# Patient Record
Sex: FEMALE | Race: WHITE | Employment: FULL TIME | URBAN - METROPOLITAN AREA
[De-identification: names, ages, dates, MRNs, and addresses within clinical notes are randomized per-mention and may not be internally consistent; named-entity substitution may affect disease eponyms.]

---

## 2018-05-10 ENCOUNTER — OFFICE VISIT (OUTPATIENT)
Dept: INTERNAL MEDICINE CLINIC | Age: 48
End: 2018-05-10

## 2018-05-10 VITALS
SYSTOLIC BLOOD PRESSURE: 116 MMHG | WEIGHT: 244 LBS | BODY MASS INDEX: 43.23 KG/M2 | HEART RATE: 99 BPM | OXYGEN SATURATION: 97 % | DIASTOLIC BLOOD PRESSURE: 78 MMHG | HEIGHT: 63 IN

## 2018-05-10 DIAGNOSIS — E78.2 MIXED HYPERLIPIDEMIA: ICD-10-CM

## 2018-05-10 DIAGNOSIS — F32.A DEPRESSION, UNSPECIFIED DEPRESSION TYPE: ICD-10-CM

## 2018-05-10 DIAGNOSIS — E66.01 OBESITY, MORBID (HCC): ICD-10-CM

## 2018-05-10 DIAGNOSIS — Z23 ENCOUNTER FOR IMMUNIZATION: Primary | ICD-10-CM

## 2018-05-10 DIAGNOSIS — R25.1 TREMORS OF NERVOUS SYSTEM: ICD-10-CM

## 2018-05-10 DIAGNOSIS — I10 ESSENTIAL HYPERTENSION: ICD-10-CM

## 2018-05-10 DIAGNOSIS — F90.9 ATTENTION DEFICIT HYPERACTIVITY DISORDER (ADHD), UNSPECIFIED ADHD TYPE: ICD-10-CM

## 2018-05-10 DIAGNOSIS — E28.2 POLYCYSTIC DISEASE, OVARIES: ICD-10-CM

## 2018-05-10 RX ORDER — METHYLPHENIDATE HYDROCHLORIDE 36 MG/1
36 TABLET ORAL
COMMUNITY
End: 2018-08-06 | Stop reason: SDUPTHER

## 2018-05-10 RX ORDER — OLOPATADINE HYDROCHLORIDE 1 MG/ML
2 SOLUTION/ DROPS OPHTHALMIC 2 TIMES DAILY
COMMUNITY
End: 2018-11-15 | Stop reason: SDUPTHER

## 2018-05-10 RX ORDER — SERTRALINE HYDROCHLORIDE 50 MG/1
50 TABLET, FILM COATED ORAL AS NEEDED
COMMUNITY
End: 2018-11-15

## 2018-05-10 RX ORDER — LISINOPRIL AND HYDROCHLOROTHIAZIDE 10; 12.5 MG/1; MG/1
TABLET ORAL DAILY
COMMUNITY
End: 2018-07-05 | Stop reason: SDUPTHER

## 2018-05-10 RX ORDER — CLONAZEPAM 0.5 MG/1
TABLET ORAL
COMMUNITY
End: 2018-09-10 | Stop reason: SDUPTHER

## 2018-05-10 RX ORDER — BISMUTH SUBSALICYLATE 262 MG
1 TABLET,CHEWABLE ORAL DAILY
COMMUNITY

## 2018-05-10 NOTE — PROGRESS NOTES
Subjective:  Ms. Sasha Fatima is a pleasant 52year old lady who is transitioning her care from [facility]. She is here to establish care. She has no complaints. Past Medical History:   Diagnosis Date    Hypertension     Ill-defined condition     PCOS     Past Surgical History:   Procedure Laterality Date    HX GYN      cyst removal    HX ORTHOPAEDIC      L Knee    HX ROTATOR CUFF REPAIR      right    HX TONSILLECTOMY         Current Outpatient Prescriptions on File Prior to Visit   Medication Sig Dispense Refill    omeprazole (PRILOSEC) 40 mg capsule Take 40 mg by mouth daily.  primidone (MYSOLINE) 50 mg tablet Take 50 mg by mouth daily.  metFORMIN (GLUCOPHAGE) 500 mg tablet Take 500 mg by mouth nightly. 2 at bedtime  Indications: Polycystic Ovarian Syndrome       No current facility-administered medications on file prior to visit. Allergies   Allergen Reactions    Pcn [Penicillins] Anaphylaxis    Zithromax [Azithromycin] Anaphylaxis   Past Medical History/Surgeries:    1. Tonsillectomy. 2. Left knee arthroscopy. 3. Right rotator cuff repair. 4. Multiple myringotomies. 5. Excision of left ovarian cyst.  6. Excision of sty right eye. Illnesses:  1. Hypertension. 2. Hyperlipidemia. 3. ADHD. 4. Nonessential tremors. 5. PTSD. 6. PCOD. 7. Chronic anxiety. Family History: Mother  at 29 of a massive heart attack. Father  at 29 of a ruptured cerebral aneurysm. Four sisters living, one has had breast cancer, but she has done well. Social History:  She is , lives alone. She works in [employer] for [address] [place] [address] [employer]. She has no children. Allergies:  Penicillin and Zithromax. Medications:   Listed in [facility]. Habits:  She smokes one pack of cigarettes per day, but does not drink. Review of Systems:  HEENT:  Denies any headaches, dizziness or blurred vision. CVR:  Denies any chest pain or palpitations.   Denies any shortness of breath, cough, wheezing, PND or orthopnea. Denies any ankle edema. GI:  Appetite is good, weight is stable. Does have a normal bowel pattern without presence of blood in stools or melena. :  Denies any urinary symptoms. PSYCHOSOCIAL:  She has been treated for depression and anxiety for quite some time. She tells me she is not taking her Zoloft right now and since then she's lost some weight. Physical Examination:  GENERAL:  Pleasant, markedly obese lady in no acute distress. She is alert and oriented. VITALS:  BP: initially 116/78, repeated by myself 110/70. P: 99.  O2 sat: 97%. WT: 244 lbs. HT: 5'2\". HEENT:  Normocephalic, atraumatic. PERRLA, EOMI. TMs normal.  Mouth mucosa pink. Tongue midline. Pharynx normal.  NECK:  Supple without adenopathy, thyromegaly or carotid bruits. CHEST:  Lungs clear to ausculation, no rales or wheezes. CV:  Heart regular rhythm without murmur or gallop. ABDOMEN:  Soft, non tender, no organomegaly or masses. EXTREMITIES:  No edema or calf tenderness. Distal pulses were present. NEUROLOGIC:  Cranial nerves II-XII intact. Excellent strength in the upper and lower extremities against resistance. Sensation is preserved. Romberg is negative. Reflexes 2+ and symmetrical.    Impression:  1. Hypertension, stable. 2. Hyperlipidemia. 3. ADHD. 4. Chronic depression/anxiety. 5. Nonessential tremors. 6. Marked obesity. 7. PCOD. Plan:  1. She will continue with her current regimen and I will have her return for fasting lab studies. 2. While here today she did sign the substance contract agreement and understands she will have to be seen every six months. 3. While here today I gave her a TDAP in her left deltoid, which she tolerated well. 4. She is referred to Dr. Tobias Stevens for pelvic and pap. 5. She did get a mammogram in December and will obtain results.

## 2018-05-10 NOTE — LETTER
Name:Nick Rao [employer] NCS:[birth year] MR #:[id number] Provider Name:Janelle Gutierrez NP  
*YEWY-877* BSMG-491 (5/16) Page 1 of 5 Initial [employer] CONTROLLED SUBSTANCE AGREEMENT I may be prescribed medications that are controlled substances as part  of my treatment plan for management of my medical condition(s). The goal of my treatment plan is to maintain and/or improve my health and wellbeing. Because controlled substances have an increased risk of abuse or harm, continual re-evaluation is needed determine if the goals of my treatment plan are being met for my safety and the safety of others. I, Xavi Camejo  am entering into this Controlled Substance Agreement with my provider, Beti Kidd NP at [place] [address] . I understand that successful treatment requires mutual trust and honesty between me and my provider. I understand that there are state and federal laws and regulations which apply to the medications that my provider may prescribe that must be followed. I understand there are risks and benefits ts of taking the medicines that my provider may prescribe. I understand and agree that following this Agreement is necessary in continuing my provider-patient relationship and success of my treatment plan. As a part of my treatment plan, I agree to the following: COMMUNICATION: 
 
1. I will communicate fully with my provider about my medical condition(s), including the effect on my daily life and how well my medications are helping. I will tell my provider all of the medications that I take for any reason, including medications I receive from another health care provider, and will notify my provider about all issues, problems or concerns, including any side effects, which may be related to my medications.  
 
I understand that this information allows my provider to adjust my treatment plan to help manage my medical condition. I understand that this information will become part of my permanent medical record. 2. I will notify my provider if I have a history of alcohol/drug misuse/addiction or if I have had treatment for alcohol/drug addiction in the past, or if I have a new problem with or concern about alcohol/drug use/addiction, because this increases the likelihood of high risk behaviors and may lead to serious medical conditions. 3. Females Only: I will notify my provider if I am or become pregnant, or if I intend to become pregnant, or if I intend to breastfeed. I understand that communication of these issues with my provider is important, due to possible effects my medication could have on an unborn fetus or breastfeeding child. Name:. Imogene Hodgkin TJY:[birth year] MR #:[id number] Provider Name:Jacynthe Carmine Gowers, NP  
*HTPX-875* BSMG-491 (5/16) Page 2 of 5 Initial [employer] MISUSE OF MEDICATIONS / DRUGS: 
 
1. I agree to take all controlled substances as prescribed, and will not misuse or abuse any controlled substances prescribed by my provider. For my safety, I will not increase the amount of medicine I take without first talking with and getting permission from my provider. 2. If I have a medical emergency, another health care provider may prescribe me medication. If I seek emergency treatment, I will notify my provider within seventy-two (72) hours. 3. I understand that my provider may discuss my use and/or possible misuse/abuse of controlled substances and alcohol, as appropriate, with any health care provider involved in my care, pharmacist or legal authority. ILLEGAL DRUGS: 
 
1. I will not use illegal drugs of any kind, including but not limited to marijuana, heroin, cocaine, or any prescription drug which is not prescribed to me.  
 
DRUG DIVERSION / PRESCRIPTION FRAUD: 
 
 1. I will not share, sell, trade, give away, or otherwise misuse my prescriptions or medications. 2. I will not alter any prescriptions provided to me by my provider. SINGLE PROVIDER: 
 
1. I agree that all controlled substances that I take will be prescribed only by my provider (or his/her covering provider) under this Agreement. This agreement does not prevent me from seeking emergency medical treatment or receiving pain management related to a surgery. PROTECTING MEDICATIONS: 
 
1. I am responsible for keeping my prescriptions and medications in a safe and secure place including safeguarding them from loss or theft. I understand that lost, stolen or damaged/destroyed prescriptions or medications will not be replaced. Name:Annelise Guzman PIV:[birth year] MR #:[id number] Provider Name:Janelle Montez NP  
*HXPC-550* BSMG-491 (5/16) Page 3 of 5 Initial [employer] PRESCRIPTION RENEWALS/REFILLS: 
 
 
1. I authorize my provider and my pharmacy to cooperate fully with any local, state, or federal law enforcement agency in the investigation of any possible misuse, sale, or other diversion of my controlled substance prescriptions or medications. RISKS: 
 
 
1. I understand that if I do not adhere to this Agreement in any way, my provider may change my prescriptions, stop prescribing controlled substances or end our provider-patient relationship. 2. If my provider decides to stop prescribing medication, or decides to end our provider-patient relationship,my provider may require that I taper my medications slowly. If necessary, my provider may also provide a prescription for other medications to treat my withdrawal symptoms. UNDERSTANDING THIS AGREEMENT: 
 
I understand that my provider may adjust or stop my prescriptions for controlled substances based on my medical condition and my treatment plan. I understand that this Agreement does not guarantee that I will be prescribed medications or controlled substances. I understand that controlled substances may be just one part of my treatment plan. My initial on each page and my signature below shows that I have read each page of this Agreement, I have had an opportunity to ask questions, and all of my questions have been answered to my satisfaction by my provider. By signing below, I agree to comply with this Agreement, and I understand that if I do not follow the Agreements listed above, my provider may stop 
 
 
 
_________________________________________  Date/Time 5/10/2018 2:53 PM   
             (Patient Signature)

## 2018-05-10 NOTE — PROGRESS NOTES
Eduar Magallanes presents with   Chief Complaint   Patient presents with   [address] patient here to establish care. She is not fasting. Has ALLERGIES to [facility]. Medications listed & updated in the chart. [facility] [place] [facility]. 1. Have you been to the ER, urgent care clinic since your last visit? Hospitalized since your last visit? No    2. Have you seen or consulted any other health care providers outside of the [address] [place] [address] since your last visit? Include any pap smears or colon screening.  No

## 2018-05-10 NOTE — LETTER
Name:Nick Rao [facility] UQN:[birth year] MR #:[id number] Provider Name:Janelle Dumont NP  
*QYEV-646* BSMG-491 (5/16) Page 1 of 5 Initial [employer] CONTROLLED SUBSTANCE AGREEMENT I may be prescribed medications that are controlled substances as part  of my treatment plan for management of my medical condition(s). The goal of my treatment plan is to maintain and/or improve my health and wellbeing. Because controlled substances have an increased risk of abuse or harm, continual re-evaluation is needed determine if the goals of my treatment plan are being met for my safety and the safety of others. ICassandra  am entering into this Controlled Substance Agreement with my provider, Tuan Andrea NP at [place] [address] . I understand that successful treatment requires mutual trust and honesty between me and my provider. I understand that there are state and federal laws and regulations which apply to the medications that my provider may prescribe that must be followed. I understand there are risks and benefits ts of taking the medicines that my provider may prescribe. I understand and agree that following this Agreement is necessary in continuing my provider-patient relationship and success of my treatment plan. As a part of my treatment plan, I agree to the following: COMMUNICATION: 
 
1. I will communicate fully with my provider about my medical condition(s), including the effect on my daily life and how well my medications are helping. I will tell my provider all of the medications that I take for any reason, including medications I receive from another health care provider, and will notify my provider about all issues, problems or concerns, including any side effects, which may be related to my medications.  
 
I understand that this information allows my provider to adjust my treatment plan to help manage my medical condition. I understand that this information will become part of my permanent medical record. 2. I will notify my provider if I have a history of alcohol/drug misuse/addiction or if I have had treatment for alcohol/drug addiction in the past, or if I have a new problem with or concern about alcohol/drug use/addiction, because this increases the likelihood of high risk behaviors and may lead to serious medical conditions. 3. Females Only: I will notify my provider if I am or become pregnant, or if I intend to become pregnant, or if I intend to breastfeed. I understand that communication of these issues with my provider is important, due to possible effects my medication could have on an unborn fetus or breastfeeding child. Name:. Andre [employer]:[birth year] MR #:[id number] Provider Name:Janelle Connors NP  
*MEIC-764* BSMG-491 (5/16) Page 2 of 5 Initial [employer] MISUSE OF MEDICATIONS / DRUGS: 
 
1. I agree to take all controlled substances as prescribed, and will not misuse or abuse any controlled substances prescribed by my provider. For my safety, I will not increase the amount of medicine I take without first talking with and getting permission from my provider. 2. If I have a medical emergency, another health care provider may prescribe me medication. If I seek emergency treatment, I will notify my provider within seventy-two (72) hours. 3. I understand that my provider may discuss my use and/or possible misuse/abuse of controlled substances and alcohol, as appropriate, with any health care provider involved in my care, pharmacist or legal authority. ILLEGAL DRUGS: 
 
1. I will not use illegal drugs of any kind, including but not limited to marijuana, heroin, cocaine, or any prescription drug which is not prescribed to me.  
 
DRUG DIVERSION / PRESCRIPTION FRAUD: 
 
 1. I will not share, sell, trade, give away, or otherwise misuse my prescriptions or medications. 2. I will not alter any prescriptions provided to me by my provider. SINGLE PROVIDER: 
 
1. I agree that all controlled substances that I take will be prescribed only by my provider (or his/her covering provider) under this Agreement. This agreement does not prevent me from seeking emergency medical treatment or receiving pain management related to a surgery. PROTECTING MEDICATIONS: 
 
1. I am responsible for keeping my prescriptions and medications in a safe and secure place including safeguarding them from loss or theft. I understand that lost, stolen or damaged/destroyed prescriptions or medications will not be replaced. Name:Annelise Monae Friday FEA:[birth year] MR #:[id number] Provider Name:Janelle Corea NP  
*NJCJ-564* BSMG-491 (5/16) Page 3 of 5 Initial [employer] PRESCRIPTION RENEWALS/REFILLS: 
 
 
1. I authorize my provider and my pharmacy to cooperate fully with any local, state, or federal law enforcement agency in the investigation of any possible misuse, sale, or other diversion of my controlled substance prescriptions or medications. RISKS: 
 
 
1. I understand that if I do not adhere to this Agreement in any way, my provider may change my prescriptions, stop prescribing controlled substances or end our provider-patient relationship. 2. If my provider decides to stop prescribing medication, or decides to end our provider-patient relationship,my provider may require that I taper my medications slowly. If necessary, my provider may also provide a prescription for other medications to treat my withdrawal symptoms. UNDERSTANDING THIS AGREEMENT: 
 
I understand that my provider may adjust or stop my prescriptions for controlled substances based on my medical condition and my treatment plan. I understand that this Agreement does not guarantee that I will be prescribed medications or controlled substances. I understand that controlled substances may be just one part of my treatment plan. My initial on each page and my signature below shows that I have read each page of this Agreement, I have had an opportunity to ask questions, and all of my questions have been answered to my satisfaction by my provider. By signing below, I agree to comply with this Agreement, and I understand that if I do not follow the Agreements listed above, my provider may stop 
 
 
 
_________________________________________  Date/Time 5/10/2018 2:53 PM   
             (Patient Signature)

## 2018-05-10 NOTE — MR AVS SNAPSHOT
[address] [address] [phone] Patient: Gerard Or MRN: [id number] LNS:[id number] Visit Information Date & Time Provider Department Dept. Phone Encounter #  
 5/10/2018  1:30 PM Ryan Dumont NP [facility] [employer] [facility] [phone] [id number] Follow-up Instructions Return in about 6 months (around 11/10/2018). Your Appointments 5/17/2018  8:50 AM  
LAB with LAB ONLY  
[facility] ([facility]) Appt Note: Jason - Fasting labs [address] [id number] [address] [facility] [address] [address] Upcoming Health Maintenance Date Due Pneumococcal 19-64 Medium Risk (1 of 1 - PPSV23) 5/22/1989 DTaP/Tdap/Td series (1 - Tdap) 5/22/1991 PAP AKA CERVICAL CYTOLOGY 5/22/1991 Influenza Age 5 to Adult 8/1/2018 Allergies as of 5/10/2018  Review Complete On: 5/10/2018 By: Ryan Dumont NP Severity Noted Reaction Type Reactions Pcn [Penicillins] High 06/25/2015    Anaphylaxis Zithromax [Azithromycin] High 06/25/2015    Anaphylaxis Current Immunizations  Never Reviewed Name Date Tdap  Incomplete Not reviewed this visit You Were Diagnosed With   
  
 Codes Comments Encounter for immunization    -  Primary ICD-10-CM: H61 ICD-9-CM: V03.89 Vitals BP Pulse Height(growth percentile) Weight(growth percentile) SpO2 BMI  
 116/78 (BP 1 Location: Left arm, BP Patient Position: Sitting) 99 5' 2.5\" (1.588 m) 244 lb (110.7 kg) 97% 43.92 kg/m2 Smoking Status Current Every Day Smoker BMI and BSA Data Body Mass Index Body Surface Area 43.92 kg/m 2 2.21 m 2 Your Updated Medication List  
  
   
This list is accurate as of 5/10/18  3:59 PM.  Always use your most recent med list.  
  
  
  
  
 AZELASTINE-FLUTICASONE NA  
by Nasal route. clonazePAM 0.5 mg tablet Commonly known as:  Evlyn Etienne Take  by mouth nightly as needed. lisinopril-hydroCHLOROthiazide 10-12.5 mg per tablet Commonly known as:  Ginger Kirks Take  by mouth daily. metFORMIN 500 mg tablet Commonly known as:  GLUCOPHAGE Take 500 mg by mouth nightly. 2 at bedtime  Indications: Polycystic Ovarian Syndrome  
  
 methylphenidate HCl 36 mg CR tablet Commonly known as:  CONCERTA Take 36 mg by mouth every morning.  
  
 multivitamin tablet Commonly known as:  ONE A DAY Take 1 Tab by mouth daily. Indications: Flinstone chewable  
  
 olopatadine 0.1 % ophthalmic solution Commonly known as:  PATANOL Administer 2 Drops to both eyes two (2) times a day. PriLOSEC 40 mg capsule Generic drug:  omeprazole Take 40 mg by mouth daily. primidone 50 mg tablet Commonly known as: MYSOLINE Take 50 mg by mouth daily. sertraline 50 mg tablet Commonly known as:  ZOLOFT Take 50 mg by mouth as needed. We Performed the Following TETANUS, DIPHTHERIA TOXOIDS AND ACELLULAR PERTUSSIS VACCINE (TDAP), IN INDIVIDS. >=7, IM [id number] CPT(R)] Follow-up Instructions Return in about 6 months (around 11/10/2018). Patient Instructions DTaP (Diphtheria, Tetanus, Pertussis) Vaccine: What You Need to Know Why get vaccinated? Diphtheria, tetanus, and pertussis are serious diseases caused by bacteria. Diphtheria and pertussis are spread from person to person. Tetanus enters the body through cuts or wounds. DIPHTHERIA causes a thick covering in the back of the throat. · It can lead to breathing problems, paralysis, heart failure, and even death. TETANUS (Lockjaw) causes painful tightening of the muscles, usually all over the body.  
· It can lead to \"locking\" of the jaw so the victim cannot open his mouth or swallow. Tetanus leads to death in up to 2 out of 10 cases. PERTUSSIS (Whooping Cough) causes coughing spells so bad that it is hard for infants to eat, drink, or breathe. These spells can last for weeks. · It can lead to pneumonia, seizures (jerking and staring spells), brain damage, and death. Diphtheria, tetanus, and pertussis vaccine (DTaP) can help prevent these diseases. Most children who are vaccinated with DTaP will be protected throughout childhood. Many more children would get these diseases if we stopped vaccinating. DTaP is a safer version of an older vaccine called DTP. DTP is no longer used in the [place]. Who should get DTaP vaccine and when? Children should get 5 doses of DTaP vaccine, one dose at each of the following ages: · 2 months · 4 months · 6 months · 15-18 months · 4-6 years DTaP may be given at the same time as other vaccines. Some children should not get DTaP vaccine or should wait. · Children with minor illnesses, such as a cold, may be vaccinated. But children who are moderately or severely ill should usually wait until they recover before getting DTaP vaccine. · Any child who had a life-threatening allergic reaction after a dose of DTaP should not get another dose. · Any child who suffered a brain or nervous system disease within 7 days after a dose of DTaP should not get another dose. · Talk with your doctor if your child: 
Shannon Zimmer Had a seizure or collapsed after a dose of DTaP. ¨ Cried non-stop for 3 hours or more after a dose of DTaP. ¨ Had a fever over 105°F after a dose of DTaP. Ask your doctor for more information. Some of these children should not get another dose of pertussis vaccine, but may get a vaccine without pertussis, called DT. Older children and adults DTaP is not licensed for adolescents, adults, or children 9years of age and older. But older people still need protection.  A vaccine called Tdap is similar to DTaP. A single dose of Tdap is recommended for people 11 through 59years of age. Another vaccine, called Td, protects against tetanus and diphtheria, but not pertussis. It is recommended every 10 years. There are separate Vaccine Information Statements for these vaccines. What are the risks from DTaP vaccine? Getting diphtheria, tetanus, or pertussis disease is much riskier than getting DTaP vaccine. However, a vaccine, like any medicine, is capable of causing serious problems, such as severe allergic reactions. The risk of DTaP vaccine causing serious harm, or death, is extremely small. Mild Problems (Common) · Fever (up to about 1 child in 4) · Redness or swelling where the shot was given (up to about 1 child in 4) · Soreness or tenderness where the shot was given (up to about 1 child in 4) These problems occur more often after the 4th and 5th doses of the DTaP series than after earlier doses. Sometimes the 4th or 5th dose of DTaP vaccine is followed by swelling of the entire arm or leg in which the shot was given, lasting 1-7 days (up to about 1 child in 27). Other mild problems include: · Fussiness (up to about 1 child in 3) · Tiredness or poor appetite (up to about 1 child in 10) · Vomiting (up to about 1 child in 48) These problems generally occur 1-3 days after the shot. Moderate Problems (Uncommon) · Seizure (jerking or staring) (about 1 child out of 14,000) · Non-stop crying, for 3 hours or more (up to about 1 child out of 1,000) · High fever, over 105°F (about 1 child out of 16,000) Severe Problems (Very Rare) · Serious allergic reaction (less than 1 out of a million doses) · Several other severe problems have been reported after DTaP vaccine. These include: 
¨ Long-term seizures, coma, or lowered consciousness. ¨ Permanent brain damage. These are so rare it is hard to tell if they are caused by the vaccine.  
Controlling fever is especially important for children who have had seizures, for any reason. It is also important if another family member has had seizures. You can reduce fever and pain by giving your child an aspirin-free pain reliever when the shot is given, and for the next 24 hours, following the package instructions. What if there is a serious reaction? What should I look for? · Look for anything that concerns you, such as signs of a severe allergic reaction, very high fever, or behavior changes. Signs of a severe allergic reaction can include hives, swelling of the face and throat, difficulty breathing, a fast heartbeat, dizziness, and weakness. These would start a few minutes to a few hours after the vaccination. What should I do? · If you think it is a severe allergic reaction or other emergency that can't wait, call 9-1-1 or get the person to the nearest hospital. Otherwise, call your doctor. · Afterward, the reaction should be reported to the Vaccine Adverse Event Reporting System (VAERS). Your doctor might file this report, or you can do it yourself through the VAERS web site at www.vaers. [facility].gov, or by calling 1-842.532.6515. VAERS is only for reporting reactions. They do not give medical advice. The National Vaccine Injury Compensation Program 
The National Vaccine Injury Compensation Program (VICP) is a federal program that was created to compensate people who may have been injured by certain vaccines. Persons who believe they may have been injured by a vaccine can learn about the program and about filing a claim by calling 5-280.109.3025 or visiting the [employer] website at www.[facility].gov/vaccinecompensation. How can I learn more? · Ask your doctor. · Call your local or state health department. · Contact the Centers for Disease Control and Prevention (CDC): 
¨ Call 5-744.731.4209 (7-794-AIQ-INFO) or ¨ Visit CDC's website at www.cdc.gov/vaccines Vaccine Information Statement DTaP (Tetanus, Diphtheria, Pertussis ) Vaccine 
(5/17/2007) [address] 333IM-60 Department of Health and [place] Centers for Disease Control and Prevention Many Vaccine Information Statements are available in [place] and other languages. See www.immunize.org/vis. Muchas hojas de información sobre vacunas están disponibles en español y en otros idiomas. Visite www.immunize.org/vis. Care instructions adapted under license by [employer] (which disclaims liability or warranty for this information). If you have questions about a medical condition or this instruction, always ask your healthcare professional. [address] any warranty or liability for your use of this information. Introducing [facility]! [employer] introduces [employer] patient portal. Now you can access parts of your medical record, email your doctor's office, and request medication refills online. 1. In your internet browser, go to [email or web address] 2. Click on the First Time User? Click Here link in the Sign In box. You will see the New Member Sign Up page. 3. Enter your [employer] Access Code exactly as it appears below. You will not need to use this code after youve completed the sign-up process. If you do not sign up before the expiration date, you must request a new code. · [employer] Access Code: [email or web address] Expires: 8/8/2018  1:15 PM 
 
4. Enter the last four digits of your Social Security Number (xxxx) and Date of Birth (mm/dd/yyyy) as indicated and click Submit. You will be taken to the next sign-up page. 5. Create a [employer] ID. This will be your [employer] login ID and cannot be changed, so think of one that is secure and easy to remember. 6. Create a [employer] password. You can change your password at any time. 7. Enter your Password Reset Question and Answer. This can be used at a later time if you forget your password. 8. Enter your e-mail address. You will receive e-mail notification when new information is available in [address] 9. Click Sign Up. You can now view and download portions of your medical record. 10. Click the Download Summary menu link to download a portable copy of your medical information. If you have questions, please visit the Frequently Asked Questions section of the [employer] website. Remember, [employer] is NOT to be used for urgent needs. For medical emergencies, dial 911. Now available from your iPhone and Android! Please provide this summary of care documentation to your next provider. Your primary care clinician is listed as Rafael Wallace. If you have any questions after today's visit, please call [phone].

## 2018-05-10 NOTE — PATIENT INSTRUCTIONS
DTaP (Diphtheria, Tetanus, Pertussis) Vaccine: What You Need to Know  Why get vaccinated? Diphtheria, tetanus, and pertussis are serious diseases caused by bacteria. Diphtheria and pertussis are spread from person to person. Tetanus enters the body through cuts or wounds. DIPHTHERIA causes a thick covering in the back of the throat. · It can lead to breathing problems, paralysis, heart failure, and even death. TETANUS (Lockjaw) causes painful tightening of the muscles, usually all over the body. · It can lead to \"locking\" of the jaw so the victim cannot open his mouth or swallow. Tetanus leads to death in up to 2 out of 10 cases. PERTUSSIS (Whooping Cough) causes coughing spells so bad that it is hard for infants to eat, drink, or breathe. These spells can last for weeks. · It can lead to pneumonia, seizures (jerking and staring spells), brain damage, and death. Diphtheria, tetanus, and pertussis vaccine (DTaP) can help prevent these diseases. Most children who are vaccinated with DTaP will be protected throughout childhood. Many more children would get these diseases if we stopped vaccinating. DTaP is a safer version of an older vaccine called DTP. DTP is no longer used in the [place]. Who should get DTaP vaccine and when? Children should get 5 doses of DTaP vaccine, one dose at each of the following ages:  · 2 months  · 4 months  · 6 months  · 15-18 months  · 4-6 years  DTaP may be given at the same time as other vaccines. Some children should not get DTaP vaccine or should wait. · Children with minor illnesses, such as a cold, may be vaccinated. But children who are moderately or severely ill should usually wait until they recover before getting DTaP vaccine. · Any child who had a life-threatening allergic reaction after a dose of DTaP should not get another dose.   · Any child who suffered a brain or nervous system disease within 7 days after a dose of DTaP should not get another dose.  · Talk with your doctor if your child:  Leodan Gaston Had a seizure or collapsed after a dose of DTaP. ¨ Cried non-stop for 3 hours or more after a dose of DTaP. ¨ Had a fever over 105°F after a dose of DTaP. Ask your doctor for more information. Some of these children should not get another dose of pertussis vaccine, but may get a vaccine without pertussis, called DT. Older children and adults  DTaP is not licensed for adolescents, adults, or children 9years of age and older. But older people still need protection. A vaccine called Tdap is similar to DTaP. A single dose of Tdap is recommended for people 11 through 59years of age. Another vaccine, called Td, protects against tetanus and diphtheria, but not pertussis. It is recommended every 10 years. There are separate Vaccine Information Statements for these vaccines. What are the risks from DTaP vaccine? Getting diphtheria, tetanus, or pertussis disease is much riskier than getting DTaP vaccine. However, a vaccine, like any medicine, is capable of causing serious problems, such as severe allergic reactions. The risk of DTaP vaccine causing serious harm, or death, is extremely small. Mild Problems (Common)  · Fever (up to about 1 child in 4)  · Redness or swelling where the shot was given (up to about 1 child in 4)  · Soreness or tenderness where the shot was given (up to about 1 child in 4)  These problems occur more often after the 4th and 5th doses of the DTaP series than after earlier doses. Sometimes the 4th or 5th dose of DTaP vaccine is followed by swelling of the entire arm or leg in which the shot was given, lasting 1-7 days (up to about 1 child in 27). Other mild problems include:  · Fussiness (up to about 1 child in 3)  · Tiredness or poor appetite (up to about 1 child in 10)  · Vomiting (up to about 1 child in 48)  These problems generally occur 1-3 days after the shot.   Moderate Problems (Uncommon)  · Seizure (jerking or staring) (about 1 child out of 14,000)  · Non-stop crying, for 3 hours or more (up to about 1 child out of 1,000)  · High fever, over 105°F (about 1 child out of 16,000)  Severe Problems (Very Rare)  · Serious allergic reaction (less than 1 out of a million doses)  · Several other severe problems have been reported after DTaP vaccine. These include:  ¨ Long-term seizures, coma, or lowered consciousness. ¨ Permanent brain damage. These are so rare it is hard to tell if they are caused by the vaccine. Controlling fever is especially important for children who have had seizures, for any reason. It is also important if another family member has had seizures. You can reduce fever and pain by giving your child an aspirin-free pain reliever when the shot is given, and for the next 24 hours, following the package instructions. What if there is a serious reaction? What should I look for? · Look for anything that concerns you, such as signs of a severe allergic reaction, very high fever, or behavior changes. Signs of a severe allergic reaction can include hives, swelling of the face and throat, difficulty breathing, a fast heartbeat, dizziness, and weakness. These would start a few minutes to a few hours after the vaccination. What should I do? · If you think it is a severe allergic reaction or other emergency that can't wait, call 9-1-1 or get the person to the nearest hospital. Otherwise, call your doctor. · Afterward, the reaction should be reported to the Vaccine Adverse Event Reporting System (VAERS). Your doctor might file this report, or you can do it yourself through the VAERS web site at www.vaers. hhs.gov, or by calling 6-458.800.3814. VAERS is only for reporting reactions. They do not give medical advice. The National Vaccine Injury Compensation Program  The National Vaccine Injury Compensation Program (VICP) is a federal program that was created to compensate people who may have been injured by certain vaccines.   Persons who believe they may have been injured by a vaccine can learn about the program and about filing a claim by calling 7-659.432.1174 or visiting the [employer] website at www.[facility].gov/vaccinecompensation. How can I learn more? · Ask your doctor. · Call your local or state health department. · Contact the Centers for Disease Control and Prevention (CDC):  ¨ Call 3-512.515.1451 (1-800-CDC-INFO) or  ¨ Visit CDC's website at www.cdc.gov/vaccines  Vaccine Information Statement  DTaP (Tetanus, Diphtheria, Pertussis ) Vaccine  (5/17/2007)  42 SETH Barreto [id number]  Department of Health and Human Services  Centers for Disease Control and Prevention  Many Vaccine Information Statements are available in [place] and other languages. See www.immunize.org/vis. Muchas hojas de información sobre vacunas están disponibles en español y en otros idiomas. Visite www.immunize.org/vis. Care instructions adapted under license by [employer] (which disclaims liability or warranty for this information). If you have questions about a medical condition or this instruction, always ask your healthcare professional. [place] [address] any warranty or liability for your use of this information.

## 2018-05-17 ENCOUNTER — LAB ONLY (OUTPATIENT)
Dept: INTERNAL MEDICINE CLINIC | Age: 48
End: 2018-05-17

## 2018-05-17 DIAGNOSIS — F90.9 ATTENTION DEFICIT HYPERACTIVITY DISORDER (ADHD), UNSPECIFIED ADHD TYPE: ICD-10-CM

## 2018-05-17 DIAGNOSIS — I10 ESSENTIAL HYPERTENSION: ICD-10-CM

## 2018-05-17 DIAGNOSIS — R53.83 FATIGUE, UNSPECIFIED TYPE: ICD-10-CM

## 2018-05-17 DIAGNOSIS — R73.9 HYPERGLYCEMIA: ICD-10-CM

## 2018-05-17 DIAGNOSIS — F32.A DEPRESSION, UNSPECIFIED DEPRESSION TYPE: ICD-10-CM

## 2018-05-17 DIAGNOSIS — E66.01 OBESITY, MORBID (HCC): ICD-10-CM

## 2018-05-17 DIAGNOSIS — E28.2 POLYCYSTIC DISEASE, OVARIES: ICD-10-CM

## 2018-05-17 DIAGNOSIS — E55.9 VITAMIN D DEFICIENCY: ICD-10-CM

## 2018-05-17 DIAGNOSIS — E78.2 MIXED HYPERLIPIDEMIA: Primary | ICD-10-CM

## 2018-05-17 LAB
ALBUMIN SERPL-MCNC: 4.1 G/DL (ref 3.9–5.4)
ALKALINE PHOS POC: 95 U/L (ref 38–126)
ALT SERPL-CCNC: 25 U/L (ref 9–52)
AST SERPL-CCNC: 19 U/L (ref 14–36)
BACTERIA UA POCT, BACTPOCT: ABNORMAL
BILIRUB UR QL STRIP: NEGATIVE
BUN BLD-MCNC: 13 MG/DL (ref 7–17)
CALCIUM BLD-MCNC: 9.5 MG/DL (ref 8.4–10.2)
CASTS UA POCT: ABNORMAL
CHLORIDE BLD-SCNC: 102 MMOL/L (ref 98–107)
CHOLEST SERPL-MCNC: 206 MG/DL (ref 0–200)
CLUE CELLS, CLUEPOCT: NEGATIVE
CO2 POC: 26 MMOL/L (ref 22–32)
CREAT BLD-MCNC: 0.7 MG/DL (ref 0.7–1.2)
CRYSTALS UA POCT, CRYSPOCT: NEGATIVE
EGFR (POC): 103.2
EPITHELIAL CELLS POCT: ABNORMAL
GLUCOSE POC: 93 MG/DL (ref 65–105)
GLUCOSE UR-MCNC: NEGATIVE MG/DL
GRAN# POC: 6.9 K/UL (ref 2–7.8)
GRAN% POC: 68.7 % (ref 37–92)
HCT VFR BLD CALC: 39 % (ref 37–51)
HDLC SERPL-MCNC: 48 MG/DL (ref 35–130)
HGB BLD-MCNC: 12.1 G/DL (ref 12–18)
KETONES P FAST UR STRIP-MCNC: NEGATIVE MG/DL
LDL CHOLESTEROL POC: 109 MG/DL (ref 0–130)
LY# POC: 2.5 K/UL (ref 0.6–4.1)
LY% POC: 25.4 % (ref 10–58.5)
MCH RBC QN: 21.9 PG (ref 26–32)
MCHC RBC-ENTMCNC: 31 G/DL (ref 30–36)
MCV RBC: 71 FL (ref 80–97)
MID #, POC: 0.5 K/UL (ref 0–1.8)
MID% POC: 5.9 % (ref 0.1–24)
MUCUS UA POCT, MUCPOCT: ABNORMAL
PH UR STRIP: 7 [PH] (ref 5–7)
PLATELET # BLD: 295 K/UL (ref 140–440)
POTASSIUM SERPL-SCNC: 4 MMOL/L (ref 3.6–5)
PROT SERPL-MCNC: 7.2 G/DL (ref 6.3–8.2)
PROT UR QL STRIP: NEGATIVE
RBC # BLD: 5.51 M/UL (ref 4.2–6.3)
RBC UA POCT, RBCPOCT: ABNORMAL
SODIUM SERPL-SCNC: 140 MMOL/L (ref 137–145)
SP GR UR STRIP: 1 (ref 1.01–1.02)
T4 FREE SERPL-MCNC: 0.92 NG/DL (ref 0.71–1.85)
TCHOL/HDL RATIO (POC): 4.3 (ref 0–4)
TOTAL BILIRUBIN POC: 0.6 MG/DL (ref 0.2–1.3)
TRICH UA POCT, TRICHPOC: NEGATIVE
TRIGL SERPL-MCNC: 245 MG/DL (ref 0–200)
TSH BLD-ACNC: 0.99 UIU/ML (ref 0.4–4.2)
UA UROBILINOGEN AMB POC: NORMAL (ref 0.2–1)
URINALYSIS CLARITY POC: CLEAR
URINALYSIS COLOR POC: YELLOW
URINE BLOOD POC: ABNORMAL
URINE CULT COMMENT, POCT: ABNORMAL
URINE LEUKOCYTES POC: NEGATIVE
URINE NITRITES POC: NEGATIVE
VITAMIN D POC: 22.2 NG/ML (ref 30–96)
VLDLC SERPL CALC-MCNC: 49 MG/DL
WBC # BLD: 9.9 K/UL (ref 4.1–10.9)
WBC UA POCT, WBCPOCT: 0
YEAST UA POCT, YEASTPOC: NEGATIVE

## 2018-05-18 LAB — HBA1C MFR BLD HPLC: 5.1 % (ref 4.5–5.7)

## 2018-05-21 NOTE — PROGRESS NOTES
Lab results discussed with patient. Start vitamin D 3 1000 units daily. Must follow diet low in cholesterol, Exercise and weight loss.

## 2018-07-05 RX ORDER — LISINOPRIL AND HYDROCHLOROTHIAZIDE 10; 12.5 MG/1; MG/1
1 TABLET ORAL DAILY
Qty: 90 TAB | Refills: 3 | Status: SHIPPED | OUTPATIENT
Start: 2018-07-05 | End: 2019-07-11 | Stop reason: SDUPTHER

## 2018-07-05 RX ORDER — PRIMIDONE 50 MG/1
50 TABLET ORAL DAILY
Qty: 90 TAB | Refills: 3 | Status: SHIPPED | OUTPATIENT
Start: 2018-07-05 | End: 2019-07-11 | Stop reason: SDUPTHER

## 2018-07-05 NOTE — TELEPHONE ENCOUNTER
RX refill request from the patient/pharmacy. Patient last seen 05/10/2018 with labs, and next appt. scheduled for None.

## 2018-08-06 DIAGNOSIS — F90.9 ATTENTION DEFICIT HYPERACTIVITY DISORDER (ADHD), UNSPECIFIED ADHD TYPE: Primary | ICD-10-CM

## 2018-08-06 RX ORDER — METHYLPHENIDATE HYDROCHLORIDE 36 MG/1
36 TABLET ORAL
Qty: 30 TAB | Refills: 0 | Status: SHIPPED | OUTPATIENT
Start: 2018-08-06 | End: 2018-09-19 | Stop reason: SDUPTHER

## 2018-08-06 NOTE — TELEPHONE ENCOUNTER
RX refill request from the patient. Patient last seen 05/10/2018 without labs (last labs done 05/17/2018), and next appt. scheduled for None.

## 2018-09-10 ENCOUNTER — TELEPHONE (OUTPATIENT)
Dept: INTERNAL MEDICINE CLINIC | Age: 48
End: 2018-09-10

## 2018-09-10 DIAGNOSIS — G47.00 INSOMNIA, UNSPECIFIED TYPE: Primary | ICD-10-CM

## 2018-09-10 RX ORDER — CLONAZEPAM 0.5 MG/1
0.5 TABLET ORAL
Qty: 30 TAB | Refills: 2 | Status: SHIPPED | OUTPATIENT
Start: 2018-09-10 | End: 2018-10-15 | Stop reason: SDUPTHER

## 2018-09-10 NOTE — TELEPHONE ENCOUNTER
Patient requesting a refill on her Clonazepam 0.5mg be sent to [facility] on [address]. Last seen: 5/10/18. No followup appointment at this time.

## 2018-09-19 DIAGNOSIS — F90.9 ATTENTION DEFICIT HYPERACTIVITY DISORDER (ADHD), UNSPECIFIED ADHD TYPE: ICD-10-CM

## 2018-09-19 NOTE — TELEPHONE ENCOUNTER
Requested Prescriptions     Pending Prescriptions Disp Refills    methylphenidate ER 36 mg 24 hr tab 30 Tab 0     Sig: Take 1 Tab (36 mg total) by mouth every morning.   Max Daily Amount: 36 mg       Last Refill: 08/06/18  Next Appointment: none

## 2018-09-20 RX ORDER — METHYLPHENIDATE HYDROCHLORIDE 36 MG/1
36 TABLET ORAL
Qty: 30 TAB | Refills: 0 | Status: SHIPPED | OUTPATIENT
Start: 2018-09-20 | End: 2018-10-15 | Stop reason: SDUPTHER

## 2018-10-15 DIAGNOSIS — G47.00 INSOMNIA, UNSPECIFIED TYPE: ICD-10-CM

## 2018-10-15 DIAGNOSIS — F90.9 ATTENTION DEFICIT HYPERACTIVITY DISORDER (ADHD), UNSPECIFIED ADHD TYPE: ICD-10-CM

## 2018-10-15 RX ORDER — METHYLPHENIDATE HYDROCHLORIDE 36 MG/1
36 TABLET ORAL
Qty: 30 TAB | Refills: 0 | Status: SHIPPED | OUTPATIENT
Start: 2018-10-15 | End: 2018-11-14 | Stop reason: SDUPTHER

## 2018-10-15 RX ORDER — CLONAZEPAM 0.5 MG/1
0.5 TABLET ORAL
Qty: 30 TAB | Refills: 2 | Status: SHIPPED | OUTPATIENT
Start: 2018-10-15 | End: 2018-11-14 | Stop reason: SDUPTHER

## 2018-10-30 ENCOUNTER — TELEPHONE (OUTPATIENT)
Dept: INTERNAL MEDICINE CLINIC | Age: 48
End: 2018-10-30

## 2018-10-30 NOTE — TELEPHONE ENCOUNTER
Campos Montero called the office on 10/29/2018 asking the status of the pre-auth for her methylphenidate prescription. Patient stated Jeannette Moura had faxed the pre-auth to the NP 2 times. I reached out to the patient today (10/30/2018) to inform her that our office had not received the pre-auth from Jeannette Moura for the methylphenidate. In the message I stated that Jeannette Moura may be faxing it to the NP's old fax number, & she would need to call [employer] & make sure they had the NP's correct fax number. I provided the correct fax number & told the patient to call if she had any questions.

## 2018-11-14 ENCOUNTER — OFFICE VISIT (OUTPATIENT)
Dept: INTERNAL MEDICINE CLINIC | Age: 48
End: 2018-11-14

## 2018-11-14 VITALS
HEART RATE: 80 BPM | WEIGHT: 244 LBS | SYSTOLIC BLOOD PRESSURE: 122 MMHG | OXYGEN SATURATION: 97 % | DIASTOLIC BLOOD PRESSURE: 86 MMHG | HEIGHT: 62 IN | BODY MASS INDEX: 44.9 KG/M2

## 2018-11-14 DIAGNOSIS — F90.9 ATTENTION DEFICIT HYPERACTIVITY DISORDER (ADHD), UNSPECIFIED ADHD TYPE: Primary | ICD-10-CM

## 2018-11-14 DIAGNOSIS — F41.9 ANXIETY: ICD-10-CM

## 2018-11-14 DIAGNOSIS — F17.200 TOBACCO DEPENDENCE: ICD-10-CM

## 2018-11-14 DIAGNOSIS — E66.01 OBESITY, MORBID (HCC): ICD-10-CM

## 2018-11-14 DIAGNOSIS — F32.A DEPRESSION, UNSPECIFIED DEPRESSION TYPE: ICD-10-CM

## 2018-11-14 DIAGNOSIS — Z23 ENCOUNTER FOR IMMUNIZATION: ICD-10-CM

## 2018-11-14 DIAGNOSIS — I10 ESSENTIAL HYPERTENSION: ICD-10-CM

## 2018-11-14 DIAGNOSIS — G47.00 INSOMNIA, UNSPECIFIED TYPE: ICD-10-CM

## 2018-11-14 DIAGNOSIS — E78.2 MIXED HYPERLIPIDEMIA: ICD-10-CM

## 2018-11-14 RX ORDER — ALBUTEROL SULFATE 90 UG/1
AEROSOL, METERED RESPIRATORY (INHALATION)
COMMUNITY
End: 2018-11-15 | Stop reason: SDUPTHER

## 2018-11-14 RX ORDER — CETIRIZINE HCL 10 MG
TABLET ORAL
COMMUNITY

## 2018-11-14 RX ORDER — CLONAZEPAM 0.5 MG/1
0.5 TABLET ORAL
Qty: 30 TAB | Refills: 2 | Status: SHIPPED | OUTPATIENT
Start: 2018-11-14 | End: 2018-12-17 | Stop reason: SDUPTHER

## 2018-11-14 RX ORDER — METHYLPHENIDATE HYDROCHLORIDE 36 MG/1
36 TABLET ORAL
Qty: 30 TAB | Refills: 0 | Status: SHIPPED | OUTPATIENT
Start: 2018-11-14 | End: 2018-12-17 | Stop reason: SDUPTHER

## 2018-11-15 RX ORDER — OLOPATADINE HYDROCHLORIDE 1 MG/ML
2 SOLUTION/ DROPS OPHTHALMIC 2 TIMES DAILY
Qty: 5 ML | Refills: 6 | Status: SHIPPED | OUTPATIENT
Start: 2018-11-15 | End: 2018-12-19 | Stop reason: SDUPTHER

## 2018-11-15 RX ORDER — ALBUTEROL SULFATE 90 UG/1
2 AEROSOL, METERED RESPIRATORY (INHALATION)
Qty: 1 INHALER | Refills: 6 | Status: SHIPPED | OUTPATIENT
Start: 2018-11-15 | End: 2019-07-21 | Stop reason: SDUPTHER

## 2018-11-15 RX ORDER — AZELASTINE HYDROCHLORIDE, FLUTICASONE PROPIONATE 137; 50 UG/1; UG/1
1 SPRAY, METERED NASAL DAILY
Qty: 1 G | Refills: 6 | Status: SHIPPED | OUTPATIENT
Start: 2018-11-15 | End: 2020-01-23 | Stop reason: SDUPTHER

## 2018-11-15 NOTE — PATIENT INSTRUCTIONS

## 2018-11-15 NOTE — PROGRESS NOTES
Subjective:  Ms. Andre Anguiano is a pleasant 50year old lady who comes in today for six month follow up of her medical problems. She has no complaints. She is in need of getting her flu shot. 1. Hypertension that has been managed on Lisinopril/HCTZ 10/12.5 mg daily. Denies any side effects from the medication. Denies headaches, dizziness or blurred vision. Denies any chest pain or palpitations. Denies any syncopal episode. 2. ADHD that has been managed effectively on Methylphenidate ER 36 mg daily. 3. Chronic anxiety, managed on Klonopin 0.25 mg nightly as needed. 4. Asthma, managed on Albuterol inhaler on a prn basis. Unfortunately she has continued to smoke. Past Medical History:   Diagnosis Date    Hypertension     Ill-defined condition     PCOS     Past Surgical History:   Procedure Laterality Date    HX GYN      cyst removal    HX ORTHOPAEDIC      L Knee    HX ROTATOR CUFF REPAIR      right    HX TONSILLECTOMY         Current Outpatient Medications on File Prior to Visit   Medication Sig Dispense Refill    cetirizine (ZYRTEC) 10 mg tablet Take  by mouth.  lisinopril-hydroCHLOROthiazide (PRINZIDE, ZESTORETIC) 10-12.5 mg per tablet Take 1 Tab by mouth daily. 90 Tab 3    primidone (MYSOLINE) 50 mg tablet Take 1 Tab by mouth daily. 90 Tab 3    multivitamin (ONE A DAY) tablet Take 1 Tab by mouth daily. Indications: Flinstone chewable      omeprazole (PRILOSEC) 40 mg capsule Take 40 mg by mouth daily.  sertraline (ZOLOFT) 50 mg tablet Take 50 mg by mouth as needed.  metFORMIN (GLUCOPHAGE) 500 mg tablet Take 500 mg by mouth nightly. 2 at bedtime  Indications: Polycystic Ovarian Syndrome       No current facility-administered medications on file prior to visit. Allergies   Allergen Reactions    Pcn [Penicillins] Anaphylaxis    Zithromax [Azithromycin] Anaphylaxis     Physical Examination:  GENERAL:  Pleasant lady in no acute distress. She is alert and oriented.   VITALS:  BP: 122/86. P: 80.  O2 sat: 97%. WT: 244 lbs. HEENT:  Normocephalic, atraumatic. TMs normal.  Mouth mucosa pink. Tongue midline. Pharynx normal.  NECK:  Supple without adenopathy, thyromegaly or carotid bruits. CHEST:  Lungs clear to auscultation, no rales or wheezes. CV:  Heart regular rhythm without murmur. EXTREMITIES:  No edema or calf tenderness. Distal pulses were present. Impression:  1. Hypertension, stable. 2. ADHD. 3. Chronic depression/anxiety. 4. Nonessential tremor. 5. Marked obesity. 6. Tobacco dependency. Plan:  1. She will continue with her current regimen and I will continue to see her every three months. 2. While in the office today I did give her her flu vaccine in her left deltoid, which she tolerated well. 3. Once again we talked about the importance of prudent diet, exercise and weight loss to keep her BMI at an acceptable level. 4. We also talked once again about smoking cessation.

## 2018-12-17 DIAGNOSIS — F90.9 ATTENTION DEFICIT HYPERACTIVITY DISORDER (ADHD), UNSPECIFIED ADHD TYPE: ICD-10-CM

## 2018-12-17 DIAGNOSIS — G47.00 INSOMNIA, UNSPECIFIED TYPE: ICD-10-CM

## 2018-12-17 RX ORDER — METHYLPHENIDATE HYDROCHLORIDE 36 MG/1
36 TABLET ORAL
Qty: 30 TAB | Refills: 0 | Status: SHIPPED | OUTPATIENT
Start: 2018-12-17 | End: 2019-01-15 | Stop reason: SDUPTHER

## 2018-12-17 RX ORDER — CLONAZEPAM 0.5 MG/1
0.5 TABLET ORAL
Qty: 30 TAB | Refills: 2 | Status: SHIPPED | OUTPATIENT
Start: 2018-12-17 | End: 2019-01-15 | Stop reason: SDUPTHER

## 2018-12-17 NOTE — TELEPHONE ENCOUNTER
Requested Prescriptions     Pending Prescriptions Disp Refills    clonazePAM (KLONOPIN) 0.5 mg tablet 30 Tab 2     Sig: Take 1 Tab by mouth nightly as needed. Max Daily Amount: 0.5 mg.    methylphenidate ER 36 mg 24 hr tab 30 Tab 0     Sig: Take 1 Tab (36 mg total) by mouth every morning.   Max Daily Amount: 36 mg

## 2018-12-19 RX ORDER — OLOPATADINE HYDROCHLORIDE 1 MG/ML
2 SOLUTION/ DROPS OPHTHALMIC 2 TIMES DAILY
Qty: 15 ML | Refills: 6 | Status: SHIPPED | OUTPATIENT
Start: 2018-12-19 | End: 2021-05-26 | Stop reason: SDUPTHER

## 2019-01-15 DIAGNOSIS — F90.9 ATTENTION DEFICIT HYPERACTIVITY DISORDER (ADHD), UNSPECIFIED ADHD TYPE: ICD-10-CM

## 2019-01-15 DIAGNOSIS — G47.00 INSOMNIA, UNSPECIFIED TYPE: ICD-10-CM

## 2019-01-15 RX ORDER — CLONAZEPAM 0.5 MG/1
0.5 TABLET ORAL
Qty: 30 TAB | Refills: 2 | Status: SHIPPED | OUTPATIENT
Start: 2019-01-15 | End: 2019-02-14 | Stop reason: SDUPTHER

## 2019-01-15 RX ORDER — METHYLPHENIDATE HYDROCHLORIDE 36 MG/1
36 TABLET ORAL
Qty: 30 TAB | Refills: 0 | Status: SHIPPED | OUTPATIENT
Start: 2019-01-15 | End: 2019-02-14 | Stop reason: SDUPTHER

## 2019-02-14 ENCOUNTER — OFFICE VISIT (OUTPATIENT)
Dept: INTERNAL MEDICINE CLINIC | Age: 49
End: 2019-02-14

## 2019-02-14 VITALS
RESPIRATION RATE: 16 BRPM | DIASTOLIC BLOOD PRESSURE: 74 MMHG | SYSTOLIC BLOOD PRESSURE: 114 MMHG | BODY MASS INDEX: 43.94 KG/M2 | WEIGHT: 238.8 LBS | TEMPERATURE: 97.8 F | OXYGEN SATURATION: 98 % | HEART RATE: 75 BPM | HEIGHT: 62 IN

## 2019-02-14 DIAGNOSIS — F90.9 ATTENTION DEFICIT HYPERACTIVITY DISORDER (ADHD), UNSPECIFIED ADHD TYPE: ICD-10-CM

## 2019-02-14 DIAGNOSIS — I10 ESSENTIAL HYPERTENSION: ICD-10-CM

## 2019-02-14 DIAGNOSIS — G47.00 INSOMNIA, UNSPECIFIED TYPE: ICD-10-CM

## 2019-02-14 DIAGNOSIS — H65.01 RIGHT ACUTE SEROUS OTITIS MEDIA, RECURRENCE NOT SPECIFIED: Primary | ICD-10-CM

## 2019-02-14 RX ORDER — DOXYCYCLINE 100 MG/1
CAPSULE ORAL
COMMUNITY
Start: 2019-01-31 | End: 2022-01-28

## 2019-02-14 RX ORDER — METHYLPHENIDATE HYDROCHLORIDE 36 MG/1
36 TABLET ORAL
Qty: 30 TAB | Refills: 0 | Status: SHIPPED | OUTPATIENT
Start: 2019-02-14 | End: 2019-03-15 | Stop reason: SDUPTHER

## 2019-02-14 RX ORDER — PREDNISONE 10 MG/1
TABLET ORAL
Qty: 21 TAB | Refills: 0 | Status: SHIPPED | OUTPATIENT
Start: 2019-02-14 | End: 2019-09-23 | Stop reason: ALTCHOICE

## 2019-02-14 RX ORDER — CLONAZEPAM 0.5 MG/1
0.5 TABLET ORAL
Qty: 30 TAB | Refills: 2 | Status: SHIPPED | OUTPATIENT
Start: 2019-02-14 | End: 2019-03-15 | Stop reason: SDUPTHER

## 2019-02-14 NOTE — PROGRESS NOTES
Subjective:  Ms. Yunior Kimball is a pleasant 50year old lady who comes in today for three month follow up of ADHD. She is currently managed on methylphenidate ER 36 mg daily. This allows her to remain focused. In addition she is also treated for chronic anxiety with Klonopin 0.25 mg nightly as needed. She has previously signed the substance control agreement. She has been really faithful at taking her medication as prescribed. In addition today she complains of fullness in her right ear. Three weeks ago she was seen at [facility] and treated for an acute sinusitis with a course of Doxycycline. Although she has continued to have some mild nasal congestion and postnasal drainage, all of the drainage has been clear. She has been taking her Zyrtec faithfully without any improvement. She denies any chills or fever. She denies any shortness of breath, cough, wheezing or hemoptysis. Past Medical History:   Diagnosis Date    Hypertension     Ill-defined condition     PCOS     Past Surgical History:   Procedure Laterality Date    HX GYN      cyst removal    HX ORTHOPAEDIC      L Knee    HX ROTATOR CUFF REPAIR      right    HX TONSILLECTOMY         Current Outpatient Medications on File Prior to Visit   Medication Sig Dispense Refill    olopatadine (PATANOL) 0.1 % ophthalmic solution Administer 2 Drops to both eyes two (2) times a day. 15 mL 6    albuterol (PROVENTIL HFA, VENTOLIN HFA, PROAIR HFA) 90 mcg/actuation inhaler Take 2 Puffs by inhalation every four (4) hours as needed for Wheezing. 1 Inhaler 6    azelastine-fluticasone 137-50 mcg/spray spry 1 [place] by Nasal route daily. 1 g 6    cetirizine (ZYRTEC) 10 mg tablet Take  by mouth.  lisinopril-hydroCHLOROthiazide (PRINZIDE, ZESTORETIC) 10-12.5 mg per tablet Take 1 Tab by mouth daily. 90 Tab 3    primidone (MYSOLINE) 50 mg tablet Take 1 Tab by mouth daily. 90 Tab 3    multivitamin (ONE A DAY) tablet Take 1 Tab by mouth daily.  Indications: Flinstone chewable      omeprazole (PRILOSEC) 40 mg capsule Take 40 mg by mouth daily.  doxycycline (VIBRAMYCIN) 100 mg capsule        No current facility-administered medications on file prior to visit. Allergies   Allergen Reactions    Pcn [Penicillins] Anaphylaxis    Zithromax [Azithromycin] Anaphylaxis   Physical Examination:  GENERAL:  Pleasant lady in no acute distress. She is alert and oriented. VITALS:  BP: 114/74. P: 75. T: 97.8. O2 sat: 98.  R: 16. HEENT:  Normocephalic, atraumatic. Left TM is normal.  Right TM reveals some fluid. There is no redness. Mouth mucosa pink. Tongue midline. Pharynx mildly erythematous without presence of exudate. No sinus tenderness. NECK:  Supple without adenopathy. CHEST:  Lungs clear to auscultation, no rales or wheezes. CV:  Heart regular rhythm without murmur. EXTREMITIES:  No edema or calf tenderness. Distal pulses were present. Impression:  1. Right serous otitis. 2. ADHD. 3. Chronic anxiety. 4. Hypertension. 5. Asthma, stable. Plan:  1. It was opted to try her on a Medrol Dosepak for her right serous otitis. Should that fail to improve then I would want her to follow with her ENT. She is in agreement. 2. While in the office she was given refill on both methylphenidate and Klonopin. 3. I will continue see her every three months.

## 2019-02-14 NOTE — PATIENT INSTRUCTIONS
Attention Deficit Hyperactivity Disorder (ADHD) in Adults: Care Instructions  Your Care Instructions    Attention deficit hyperactivity disorder, or ADHD, is a condition that makes it hard to pay attention. So you may have problems when you try to focus, get organized, and finish tasks. It might make you more active than other people. Or you might do things without thinking first.  ADHD is very common. It usually starts in early childhood. Many adults don't realize they have it until their children are diagnosed. Then they become aware of their own symptoms. Doctors don't know what causes ADHD. But it often runs in families. ADHD can be treated with medicines, behavior training, and counseling. Treatment can improve your life. Follow-up care is a key part of your treatment and safety. Be sure to make and go to all appointments, and call your doctor if you are having problems. It's also a good idea to know your test results and keep a list of the medicines you take. How can you care for yourself at home? · Learn all you can about ADHD. This will help you and your family understand it better. · Take your medicines exactly as prescribed. Call your doctor if you think you are having a problem with your medicine. You will get more details on the specific medicines your doctor prescribes. · If you miss a dose of your medicine, do not take an extra dose. · If your doctor suggests counseling, find a counselor you like and trust. Talk openly and honestly. Be willing to make some changes. · Find a support group for adults with ADHD. Talking to others with the same problems can help you feel better. It can also give you ideas about how to best cope with the condition. · Get rid of distractions at your work space. Keep your desk clean. Try not to face a window or busy hallway. · Use files, planners, and other tools to keep you organized. · Limit use of alcohol, and do not use illegal drugs.  People with ADHD tend to become addicted more easily than others. Tell your doctor if you need help to quit. Counseling, support groups, and sometimes medicines can help you stay free of alcohol or drugs. · Get at least 30 minutes of physical activity on most days of the week. Exercise has been shown to help people cope with ADHD. Walking is a good choice. You also may want to do other activities, such as running, swimming, cycling, or playing tennis or team sports. When should you call for help? Watch closely for changes in your health, and be sure to contact your doctor if:    · You feel sad a lot or cry all the time.     · You have trouble sleeping, or you sleep too much.     · You find it hard to concentrate, make decisions, or remember things.     · You change how you normally eat.     · You feel guilty for no reason. Where can you learn more? Go to http://nella-muriel.info/. Enter B196 in the search box to learn more about \"Attention Deficit Hyperactivity Disorder (ADHD) in Adults: Care Instructions. \"  Current as of: September 11, 2018  Content Version: 11.9  © 9198-2238 [employer]. Care instructions adapted under license by [employer] (which disclaims liability or warranty for this information). If you have questions about a medical condition or this instruction, always ask your healthcare professional. [place] [address] any warranty or liability for your use of this information.

## 2019-02-14 NOTE — PROGRESS NOTES
Reviewed record in preparation for visit and have obtained necessary documentation. Identified pt with two pt identifiers(name and ). No chief complaint on file. Coordination of Care Questionnaire:  :     1) Have you been to an emergency room, urgent care clinic since your last visit? Yes [facility] for earache, sinus infection    Hospitalized since your last visit? No               2) Have you seen or consulted any other health care providers outside of [address] [place] [address] since your last visit?   No

## 2019-03-15 DIAGNOSIS — G47.00 INSOMNIA, UNSPECIFIED TYPE: ICD-10-CM

## 2019-03-15 DIAGNOSIS — F90.9 ATTENTION DEFICIT HYPERACTIVITY DISORDER (ADHD), UNSPECIFIED ADHD TYPE: ICD-10-CM

## 2019-03-18 RX ORDER — CLONAZEPAM 0.5 MG/1
0.5 TABLET ORAL
Qty: 30 TAB | Refills: 2 | Status: SHIPPED | OUTPATIENT
Start: 2019-03-18 | End: 2019-04-16 | Stop reason: SDUPTHER

## 2019-03-18 RX ORDER — METHYLPHENIDATE HYDROCHLORIDE 36 MG/1
36 TABLET ORAL
Qty: 30 TAB | Refills: 0 | Status: SHIPPED | OUTPATIENT
Start: 2019-03-18 | End: 2019-04-16 | Stop reason: SDUPTHER

## 2019-04-16 DIAGNOSIS — G47.00 INSOMNIA, UNSPECIFIED TYPE: ICD-10-CM

## 2019-04-16 DIAGNOSIS — F90.9 ATTENTION DEFICIT HYPERACTIVITY DISORDER (ADHD), UNSPECIFIED ADHD TYPE: ICD-10-CM

## 2019-04-16 RX ORDER — METHYLPHENIDATE HYDROCHLORIDE 36 MG/1
36 TABLET ORAL
Qty: 30 TAB | Refills: 0 | Status: SHIPPED | OUTPATIENT
Start: 2019-04-16 | End: 2019-05-15 | Stop reason: SDUPTHER

## 2019-04-16 RX ORDER — CLONAZEPAM 0.5 MG/1
0.5 TABLET ORAL
Qty: 30 TAB | Refills: 2 | Status: SHIPPED | OUTPATIENT
Start: 2019-04-16 | End: 2019-05-15 | Stop reason: SDUPTHER

## 2019-04-16 NOTE — TELEPHONE ENCOUNTER
Wanted to  scripts on her medications. Requested Prescriptions     Pending Prescriptions Disp Refills    clonazePAM (KLONOPIN) 0.5 mg tablet 30 Tab 2     Sig: Take 1 Tab by mouth nightly as needed (anxiety). Max Daily Amount: 0.5 mg.    methylphenidate ER 36 mg 24 hr tab 30 Tab 0     Sig: Take 1 Tab by mouth every morning. Max Daily Amount: 36 mg.

## 2019-05-15 ENCOUNTER — OFFICE VISIT (OUTPATIENT)
Dept: INTERNAL MEDICINE CLINIC | Age: 49
End: 2019-05-15

## 2019-05-15 VITALS
TEMPERATURE: 97.8 F | BODY MASS INDEX: 41.41 KG/M2 | HEIGHT: 62 IN | SYSTOLIC BLOOD PRESSURE: 118 MMHG | OXYGEN SATURATION: 98 % | RESPIRATION RATE: 16 BRPM | HEART RATE: 78 BPM | DIASTOLIC BLOOD PRESSURE: 80 MMHG | WEIGHT: 225 LBS

## 2019-05-15 DIAGNOSIS — E66.01 OBESITY, MORBID (HCC): ICD-10-CM

## 2019-05-15 DIAGNOSIS — M25.512 ACUTE PAIN OF LEFT SHOULDER: ICD-10-CM

## 2019-05-15 DIAGNOSIS — F90.9 ATTENTION DEFICIT HYPERACTIVITY DISORDER (ADHD), UNSPECIFIED ADHD TYPE: Primary | ICD-10-CM

## 2019-05-15 DIAGNOSIS — G47.00 INSOMNIA, UNSPECIFIED TYPE: ICD-10-CM

## 2019-05-15 DIAGNOSIS — F41.9 ANXIETY: ICD-10-CM

## 2019-05-15 RX ORDER — METHYLPHENIDATE HYDROCHLORIDE 36 MG/1
36 TABLET ORAL
Qty: 30 TAB | Refills: 0 | Status: SHIPPED | OUTPATIENT
Start: 2019-05-15 | End: 2019-06-12 | Stop reason: SDUPTHER

## 2019-05-15 RX ORDER — CLONAZEPAM 0.5 MG/1
0.5 TABLET ORAL
Qty: 30 TAB | Refills: 2 | Status: SHIPPED | OUTPATIENT
Start: 2019-05-15 | End: 2019-06-12 | Stop reason: SDUPTHER

## 2019-05-15 RX ORDER — METHOCARBAMOL 500 MG/1
TABLET, FILM COATED ORAL
COMMUNITY
Start: 2019-05-04 | End: 2021-11-04

## 2019-05-15 RX ORDER — ACETAMINOPHEN 500 MG
TABLET ORAL 2 TIMES DAILY
COMMUNITY

## 2019-05-15 RX ORDER — MELOXICAM 15 MG/1
15 TABLET ORAL DAILY
Qty: 30 TAB | Refills: 0 | Status: SHIPPED | OUTPATIENT
Start: 2019-05-15 | End: 2021-11-04

## 2019-05-15 NOTE — PATIENT INSTRUCTIONS
Body Mass Index: Care Instructions Your Care Instructions Body mass index (BMI) can help you see if your weight is raising your risk for health problems. It uses a formula to compare how much you weigh with how tall you are. · A BMI lower than 18.5 is considered underweight. · A BMI between 18.5 and 24.9 is considered healthy. · A BMI between 25 and 29.9 is considered overweight. A BMI of 30 or higher is considered obese. If your BMI is in the normal range, it means that you have a lower risk for weight-related health problems. If your BMI is in the overweight or obese range, you may be at increased risk for weight-related health problems, such as high blood pressure, heart disease, stroke, arthritis or joint pain, and diabetes. If your BMI is in the underweight range, you may be at increased risk for health problems such as fatigue, lower protection (immunity) against illness, muscle loss, bone loss, hair loss, and hormone problems. BMI is just one measure of your risk for weight-related health problems. You may be at higher risk for health problems if you are not active, you eat an unhealthy diet, or you drink too much alcohol or use tobacco products. Follow-up care is a key part of your treatment and safety. Be sure to make and go to all appointments, and call your doctor if you are having problems. It's also a good idea to know your test results and keep a list of the medicines you take. How can you care for yourself at home? · Practice healthy eating habits. This includes eating plenty of fruits, vegetables, whole grains, lean protein, and low-fat dairy. · If your doctor recommends it, get more exercise. Walking is a good choice. Bit by bit, increase the amount you walk every day. Try for at least 30 minutes on most days of the week. · Do not smoke. Smoking can increase your risk for health problems.  If you need help quitting, talk to your doctor about stop-smoking programs and medicines. These can increase your chances of quitting for good. · Limit alcohol to 2 drinks a day for men and 1 drink a day for women. Too much alcohol can cause health problems. If you have a BMI higher than 25 · Your doctor may do other tests to check your risk for weight-related health problems. This may include measuring the distance around your waist. A waist measurement of more than 40 inches in men or 35 inches in women can increase the risk of weight-related health problems. · Talk with your doctor about steps you can take to stay healthy or improve your health. You may need to make lifestyle changes to lose weight and stay healthy, such as changing your diet and getting regular exercise. If you have a BMI lower than 18.5 · Your doctor may do other tests to check your risk for health problems. · Talk with your doctor about steps you can take to stay healthy or improve your health. You may need to make lifestyle changes to gain or maintain weight and stay healthy, such as getting more healthy foods in your diet and doing exercises to build muscle. Where can you learn more? Go to http://nella-muriel.info/. Enter S176 in the search box to learn more about \"Body Mass Index: Care Instructions. \" Current as of: June 25, 2018 Content Version: 11.9 © 7039-0731 [employer]. Care instructions adapted under license by [employer] (which disclaims liability or warranty for this information). If you have questions about a medical condition or this instruction, always ask your healthcare professional. [address] any warranty or liability for your use of this information.

## 2019-05-15 NOTE — PROGRESS NOTES
Aicha Reynosofield  Identified pt with two pt identifiers(name and ). Chief Complaint Patient presents with  Attention Deficit Disorder 3 month followup  Medication Evaluation 3 month foll  Shoulder Injury  
  left Doing well on medication. New problem of left shoulder injury approximately 10 days ago. Seen at [facility]. 
 
1. Have you been to the ER, urgent care clinic since your last visit? Hospitalized since your last visit? Yes[facility] 2. Have you seen or consulted any other health care providers outside of the [address] [place] [address] since your last visit? Include any pap smears or colon screening. no 
 
 
Medication reconciliation up to date and corrected with patient at this time. Today's provider has been notified of reason for visit, vitals and flowsheets obtained on patients. Reviewed record in preparation for visit, huddled with provider and have obtained necessary documentation. Depression Risk Factor Screening:  
 
3 most recent PHQ Screens 5/15/2019 Little interest or pleasure in doing things Several days Feeling down, depressed, irritable, or hopeless Several days Total Score PHQ 2 2 Functional Ability and Level of Safety: Activities of Daily Living No flowsheet data found. Fall Risk No flowsheet data found. Abuse Screen No flowsheet data found. Health Maintenance Due Topic  Pneumococcal 0-64 years (1 of 1 - PPSV23)  PAP AKA CERVICAL CYTOLOGY Wt Readings from Last 3 Encounters:  
05/15/19 225 lb (102.1 kg) 19 238 lb 12.8 oz (108.3 kg)  
18 244 lb (110.7 kg) Temp Readings from Last 3 Encounters:  
05/15/19 97.8 °F (36.6 °C) (Oral) 19 97.8 °F (36.6 °C) (Oral) 16 98.2 °F (36.8 °C) BP Readings from Last 3 Encounters:  
05/15/19 118/80  
19 114/74  
18 122/86 Pulse Readings from Last 3 Encounters:  
05/15/19 78  
19 75  
18 80 Vitals:  
 05/15/19 9798 BP: 118/80 Pulse: 78 Resp: 16 Temp: 97.8 °F (36.6 °C) TempSrc: Oral  
SpO2: 98% Weight: 225 lb (102.1 kg) Height: 5' 2.25\" (1.581 m) PainSc:   4 PainLoc: Shoulder Patient Care Team  
Patient Care Team: 
He Birmingham NP as PCP - General (Nurse Practitioner)

## 2019-05-15 NOTE — PROGRESS NOTES
History of Present Illness:   Ms. Allen Cleary is a pleasant, 50year-old lady who comes in today for three month followup of her ADHD. She is currently managed on Methylphenidate ER 36 mg daily. This has allowed her to remain focused at work. Chronic anxiety. Effectively managed on Klonopin 0.25 mg nightly as needed. Today, her new complaint is left shoulder pain. About ten days ago, she was pulling the hose out of her swimming pool and as she pulled, she had a severe pain in her left shoulder. She attempted to treat it conservatively at home, but since she failed to improve, she was seen at [facility] at which time she was told she probably had a sprained shoulder. She was placed in a sling and advised to follow up with orthopedics. She has remained in the splint since then. She has been using two or three Advil a day. She continued to have marked discomfort. She denies any neck pain. She denies any numbness or tingling down this left arm. Past Medical History:   Diagnosis Date    Hypertension     Ill-defined condition     PCOS     Past Surgical History:   Procedure Laterality Date    HX GYN      cyst removal    HX ORTHOPAEDIC      L Knee    HX ROTATOR CUFF REPAIR      right    HX TONSILLECTOMY         Current Outpatient Medications on File Prior to Visit   Medication Sig Dispense Refill    methocarbamol (ROBAXIN) 500 mg tablet       cholecalciferol (VITAMIN D3) 2,000 unit cap capsule Take  by mouth two (2) times a day.  olopatadine (PATANOL) 0.1 % ophthalmic solution Administer 2 Drops to both eyes two (2) times a day. 15 mL 6    albuterol (PROVENTIL HFA, VENTOLIN HFA, PROAIR HFA) 90 mcg/actuation inhaler Take 2 Puffs by inhalation every four (4) hours as needed for Wheezing. 1 Inhaler 6    azelastine-fluticasone 137-50 mcg/spray spry 1 [place] by Nasal route daily. 1 g 6    cetirizine (ZYRTEC) 10 mg tablet Take  by mouth.       lisinopril-hydroCHLOROthiazide (PRINZIDE, ZESTORETIC) 10-12.5 mg per tablet Take 1 Tab by mouth daily. 90 Tab 3    primidone (MYSOLINE) 50 mg tablet Take 1 Tab by mouth daily. 90 Tab 3    multivitamin (ONE A DAY) tablet Take 1 Tab by mouth daily. Indications: Flinstone chewable      omeprazole (PRILOSEC) 40 mg capsule Take 40 mg by mouth daily.  doxycycline (VIBRAMYCIN) 100 mg capsule       predniSONE (STERAPRED DS) 10 mg dose pack Take as directed on packet 21 Tab 0     No current facility-administered medications on file prior to visit. Allergies   Allergen Reactions    Pcn [Penicillins] Anaphylaxis    Zithromax [Azithromycin] Anaphylaxis     Physical Examination:     GENERAL: Pleasant, young lady in no acute distress. She is alert and oriented. She answers my questions appropriately. VITALS: BP:  118/80. P:  78.  O2 saturation:  98%. T:  97.8.  R:  16. HEENT: Normocephalic, atraumatic. NECK: Supple without adenopathy. CHEST: Lungs clear to auscultation, no rales or wheezes. CV: Heart regular rhythm without murmur or gallop. EXTREMITIES: No edema or calf tenderness. Distal pulses were present. Left shoulder - she has full range of motion. There is very little discomfort on palpation. She had excellent strength in the upper extremities against resistance. Sensation is preserved. Impression:   1. ADHD. 2. Chronic anxiety. 3. Left shoulder pain. Plan:    1. She was given a refill on her medication. I will continue to see her every three months. When she comes in in three months, we will do an updated history and physical and fasting lab studies. 2. In terms of her left shoulder, I am starting her on Meloxicam 15 mg daily with food. I have asked her to come out of the sling and exercise her shoulder gently so that she does not develop a frozen shoulder.   I am referring her back to Dr. Muriel Fuentes, who she has seen in the past.    3. We discussed the importance of a prudent diet, exercise and weight management to keep her BMI at an acceptable level.

## 2019-06-12 DIAGNOSIS — F90.9 ATTENTION DEFICIT HYPERACTIVITY DISORDER (ADHD), UNSPECIFIED ADHD TYPE: ICD-10-CM

## 2019-06-12 DIAGNOSIS — G47.00 INSOMNIA, UNSPECIFIED TYPE: ICD-10-CM

## 2019-06-12 RX ORDER — CLONAZEPAM 0.5 MG/1
0.5 TABLET ORAL
Qty: 30 TAB | Refills: 2 | Status: SHIPPED | OUTPATIENT
Start: 2019-06-12 | End: 2019-07-11 | Stop reason: SDUPTHER

## 2019-06-12 RX ORDER — METHYLPHENIDATE HYDROCHLORIDE 36 MG/1
36 TABLET ORAL
Qty: 30 TAB | Refills: 0 | Status: SHIPPED | OUTPATIENT
Start: 2019-06-12 | End: 2019-07-11 | Stop reason: SDUPTHER

## 2019-06-12 NOTE — TELEPHONE ENCOUNTER
Request two prescriptions and she will pick them up. Requested Prescriptions     Pending Prescriptions Disp Refills    methylphenidate ER 36 mg 24 hr tab 30 Tab 0     Sig: Take 1 Tab by mouth every morning. Max Daily Amount: 36 mg.    clonazePAM (KLONOPIN) 0.5 mg tablet 30 Tab 2     Sig: Take 1 Tab by mouth nightly as needed (anxiety).  Max Daily Amount: 0.5 mg.

## 2019-07-11 DIAGNOSIS — G47.00 INSOMNIA, UNSPECIFIED TYPE: ICD-10-CM

## 2019-07-11 DIAGNOSIS — F90.9 ATTENTION DEFICIT HYPERACTIVITY DISORDER (ADHD), UNSPECIFIED ADHD TYPE: ICD-10-CM

## 2019-07-11 RX ORDER — CLONAZEPAM 0.5 MG/1
0.5 TABLET ORAL
Qty: 30 TAB | Refills: 1 | Status: SHIPPED | OUTPATIENT
Start: 2019-07-11 | End: 2019-08-12 | Stop reason: SDUPTHER

## 2019-07-11 RX ORDER — PRIMIDONE 50 MG/1
50 TABLET ORAL DAILY
Qty: 90 TAB | Refills: 3 | Status: SHIPPED | OUTPATIENT
Start: 2019-07-11 | End: 2020-06-25

## 2019-07-11 RX ORDER — LISINOPRIL AND HYDROCHLOROTHIAZIDE 10; 12.5 MG/1; MG/1
1 TABLET ORAL DAILY
Qty: 90 TAB | Refills: 3 | Status: SHIPPED | OUTPATIENT
Start: 2019-07-11 | End: 2020-06-25

## 2019-07-11 RX ORDER — METHYLPHENIDATE HYDROCHLORIDE 36 MG/1
36 TABLET ORAL
Qty: 30 TAB | Refills: 0 | Status: SHIPPED | OUTPATIENT
Start: 2019-07-11 | End: 2019-08-12 | Stop reason: SDUPTHER

## 2019-07-11 NOTE — TELEPHONE ENCOUNTER
PCP: Odette Kumar NP    Last appt: 5/15/2019  Future Appointments   Date Time Provider Richie David   8/28/2019  8:30 AM Christo Goodwin NP [facility]       Requested Prescriptions     Pending Prescriptions Disp Refills    methylphenidate ER 36 mg 24 hr tab 30 Tab 0     Sig: Take 1 Tab by mouth every morning. Max Daily Amount: 36 mg.    clonazePAM (KLONOPIN) 0.5 mg tablet 30 Tab 1     Sig: Take 1 Tab by mouth nightly as needed (anxiety). Max Daily Amount: 0.5 mg.    lisinopril-hydroCHLOROthiazide (PRINZIDE, ZESTORETIC) 10-12.5 mg per tablet 90 Tab 3     Sig: Take 1 Tab by mouth daily.  primidone (MYSOLINE) 50 mg tablet 90 Tab 3     Sig: Take 1 Tab by mouth daily.        Prior labs and Blood pressures:  BP Readings from Last 3 Encounters:   05/15/19 118/80   02/14/19 114/74   11/14/18 122/86     Lab Results   Component Value Date/Time    Sodium 139 03/12/2016 06:24 PM    Potassium 3.4 (L) 03/12/2016 06:24 PM    Chloride 105 03/12/2016 06:24 PM    CO2 20 (L) 03/12/2016 06:24 PM    Anion gap 14 03/12/2016 06:24 PM    Glucose 87 03/12/2016 06:24 PM    BUN 7 03/12/2016 06:24 PM    Creatinine 0.77 03/12/2016 06:24 PM    BUN/Creatinine ratio 9 (L) 03/12/2016 06:24 PM    GFR est AA >60 03/12/2016 06:24 PM    GFR est non-AA >60 03/12/2016 06:24 PM    Calcium 8.9 03/12/2016 06:24 PM     Lab Results   Component Value Date/Time    Hemoglobin A1c (POC) 5.1 05/17/2018 10:39 AM     Lab Results   Component Value Date/Time    Cholesterol (POC) 206.0 (A) 05/17/2018 10:39 AM    HDL Cholesterol (POC) 48.0 05/17/2018 10:39 AM    LDL Cholesterol (POC) 109.0 05/17/2018 10:39 AM    Triglycerides (POC) 245.0 (A) 05/17/2018 10:39 AM     No results found for: Sandra Vogel, XQVID3, XQVID, VD3RIA    No results found for: TSH, TSH2, TSH3, TSHP, TSHEXT

## 2019-07-11 NOTE — TELEPHONE ENCOUNTER
Pharmacy on file verified  Requested Prescriptions     Pending Prescriptions Disp Refills    methylphenidate ER 36 mg 24 hr tab 30 Tab 0     Sig: Take 1 Tab by mouth every morning. Max Daily Amount: 36 mg.    clonazePAM (KLONOPIN) 0.5 mg tablet 30 Tab 2     Sig: Take 1 Tab by mouth nightly as needed (anxiety). Max Daily Amount: 0.5 mg.    lisinopril-hydroCHLOROthiazide (PRINZIDE, ZESTORETIC) 10-12.5 mg per tablet 90 Tab 3     Sig: Take 1 Tab by mouth daily.  primidone (MYSOLINE) 50 mg tablet 90 Tab 3     Sig: Take 1 Tab by mouth daily.      LOV  NOV  LF methylphenidate & Clonazepam - 6/12/19        Lisinopril & primidone- 7/5/18

## 2019-07-22 RX ORDER — ALBUTEROL SULFATE 90 UG/1
AEROSOL, METERED RESPIRATORY (INHALATION)
Qty: 8.5 INHALER | Refills: 6 | Status: SHIPPED | OUTPATIENT
Start: 2019-07-22 | End: 2020-04-13

## 2019-08-12 DIAGNOSIS — F90.9 ATTENTION DEFICIT HYPERACTIVITY DISORDER (ADHD), UNSPECIFIED ADHD TYPE: ICD-10-CM

## 2019-08-12 DIAGNOSIS — G47.00 INSOMNIA, UNSPECIFIED TYPE: ICD-10-CM

## 2019-08-12 RX ORDER — METHYLPHENIDATE HYDROCHLORIDE 36 MG/1
36 TABLET ORAL
Qty: 30 TAB | Refills: 0 | Status: SHIPPED | OUTPATIENT
Start: 2019-08-12 | End: 2019-09-13 | Stop reason: SDUPTHER

## 2019-08-12 RX ORDER — CLONAZEPAM 0.5 MG/1
0.5 TABLET ORAL
Qty: 30 TAB | Refills: 1 | Status: SHIPPED | OUTPATIENT
Start: 2019-08-12 | End: 2019-10-15 | Stop reason: SDUPTHER

## 2019-08-12 NOTE — TELEPHONE ENCOUNTER
Last Refill: 7/11/19  Last visit:5/15/2019      Requested Prescriptions     Pending Prescriptions Disp Refills    clonazePAM (KLONOPIN) 0.5 mg tablet 30 Tab 1     Sig: Take 1 Tab by mouth nightly as needed (anxiety). Max Daily Amount: 0.5 mg.    methylphenidate ER 36 mg 24 hr tab 30 Tab 0     Sig: Take 1 Tab by mouth every morning. Max Daily Amount: 36 mg.

## 2019-08-12 NOTE — TELEPHONE ENCOUNTER
Patient called and requested two refill and said she had to pick these up. Requested Prescriptions     Pending Prescriptions Disp Refills    clonazePAM (KLONOPIN) 0.5 mg tablet 30 Tab 1     Sig: Take 1 Tab by mouth nightly as needed (anxiety). Max Daily Amount: 0.5 mg.    methylphenidate ER 36 mg 24 hr tab 30 Tab 0     Sig: Take 1 Tab by mouth every morning. Max Daily Amount: 36 mg.

## 2019-09-12 DIAGNOSIS — F90.9 ATTENTION DEFICIT HYPERACTIVITY DISORDER (ADHD), UNSPECIFIED ADHD TYPE: ICD-10-CM

## 2019-09-13 NOTE — TELEPHONE ENCOUNTER
PCP: Odette Kumar NP    Last appt: 8/28/2019  Future Appointments   Date Time Provider Richie David   9/23/2019 10:00 AM Christo Goodwin NP [facility]       Requested Prescriptions     Pending Prescriptions Disp Refills    methylphenidate ER 36 mg 24 hr tab 30 Tab 0     Sig: Take 1 Tab by mouth every morning.  Max Daily Amount: 36 mg.       Prior labs and Blood pressures:  BP Readings from Last 3 Encounters:   05/15/19 118/80   02/14/19 114/74   11/14/18 122/86     Lab Results   Component Value Date/Time    Sodium 139 03/12/2016 06:24 PM    Potassium 3.4 (L) 03/12/2016 06:24 PM    Chloride 105 03/12/2016 06:24 PM    CO2 20 (L) 03/12/2016 06:24 PM    Anion gap 14 03/12/2016 06:24 PM    Glucose 87 03/12/2016 06:24 PM    BUN 7 03/12/2016 06:24 PM    Creatinine 0.77 03/12/2016 06:24 PM    BUN/Creatinine ratio 9 (L) 03/12/2016 06:24 PM    GFR est AA >60 03/12/2016 06:24 PM    GFR est non-AA >60 03/12/2016 06:24 PM    Calcium 8.9 03/12/2016 06:24 PM     Lab Results   Component Value Date/Time    Hemoglobin A1c (POC) 5.1 05/17/2018 10:39 AM     Lab Results   Component Value Date/Time    Cholesterol (POC) 206.0 (A) 05/17/2018 10:39 AM    HDL Cholesterol (POC) 48.0 05/17/2018 10:39 AM    LDL Cholesterol (POC) 109.0 05/17/2018 10:39 AM    Triglycerides (POC) 245.0 (A) 05/17/2018 10:39 AM     No results found for: Randol Lela, XQVID3, XQVID, VD3RIA    No results found for: TSH, TSH2, TSH3, TSHP, TSHEXT, TSHEXT

## 2019-09-16 RX ORDER — METHYLPHENIDATE HYDROCHLORIDE 36 MG/1
36 TABLET ORAL
Qty: 30 TAB | Refills: 0 | Status: SHIPPED | OUTPATIENT
Start: 2019-09-16 | End: 2019-10-15 | Stop reason: SDUPTHER

## 2019-09-23 ENCOUNTER — OFFICE VISIT (OUTPATIENT)
Dept: INTERNAL MEDICINE CLINIC | Age: 49
End: 2019-09-23

## 2019-09-23 VITALS
HEIGHT: 62 IN | DIASTOLIC BLOOD PRESSURE: 72 MMHG | TEMPERATURE: 97.9 F | OXYGEN SATURATION: 98 % | BODY MASS INDEX: 41.51 KG/M2 | HEART RATE: 78 BPM | SYSTOLIC BLOOD PRESSURE: 132 MMHG | WEIGHT: 225.6 LBS | RESPIRATION RATE: 16 BRPM

## 2019-09-23 DIAGNOSIS — E55.9 VITAMIN D DEFICIENCY: ICD-10-CM

## 2019-09-23 DIAGNOSIS — F90.9 ATTENTION DEFICIT HYPERACTIVITY DISORDER (ADHD), UNSPECIFIED ADHD TYPE: ICD-10-CM

## 2019-09-23 DIAGNOSIS — R73.9 HYPERGLYCEMIA: ICD-10-CM

## 2019-09-23 DIAGNOSIS — D72.829 LEUKOCYTOSIS, UNSPECIFIED TYPE: ICD-10-CM

## 2019-09-23 DIAGNOSIS — G47.00 INSOMNIA, UNSPECIFIED TYPE: Primary | ICD-10-CM

## 2019-09-23 DIAGNOSIS — E78.2 MIXED HYPERLIPIDEMIA: ICD-10-CM

## 2019-09-23 DIAGNOSIS — F41.9 ANXIETY: ICD-10-CM

## 2019-09-23 DIAGNOSIS — R53.83 FATIGUE, UNSPECIFIED TYPE: ICD-10-CM

## 2019-09-23 LAB
A-G RATIO,AGRAT: 1.5 RATIO
ALBUMIN SERPL-MCNC: 4.7 G/DL (ref 3.9–5.4)
ALP SERPL-CCNC: 95 U/L (ref 38–126)
ALT SERPL-CCNC: 17 U/L (ref 0–35)
ANION GAP SERPL CALC-SCNC: 14 MMOL/L
AST SERPL W P-5'-P-CCNC: 20 U/L (ref 14–36)
BILIRUB SERPL-MCNC: 0.3 MG/DL (ref 0.2–1.3)
BILIRUB UR QL: NEGATIVE
BUN SERPL-MCNC: 9 MG/DL (ref 7–17)
BUN/CREATININE RATIO,BUCR: 13 RATIO
CALCIUM SERPL-MCNC: 9.7 MG/DL (ref 8.4–10.2)
CHLORIDE SERPL-SCNC: 104 MMOL/L (ref 98–107)
CHOL/HDL RATIO,CHHD: 4 RATIO (ref 0–4)
CHOLEST SERPL-MCNC: 209 MG/DL (ref 0–200)
CLARITY: CLEAR
CO2 SERPL-SCNC: 24 MMOL/L (ref 22–32)
COLOR UR: ABNORMAL
CREAT SERPL-MCNC: 0.7 MG/DL (ref 0.7–1.2)
GLOBULIN,GLOB: 3.2
GLUCOSE 24H UR-MRATE: NEGATIVE G/(24.H)
GLUCOSE SERPL-MCNC: 99 MG/DL (ref 65–105)
HBA1C MFR BLD HPLC: 4.5 % (ref 4–5.7)
HDLC SERPL-MCNC: 55 MG/DL (ref 35–130)
HGB UR QL STRIP: ABNORMAL
KETONES UR QL STRIP.AUTO: NEGATIVE
LDL/HDL RATIO,LDHD: 2 RATIO
LDLC SERPL CALC-MCNC: 120 MG/DL (ref 0–130)
LEUKOCYTE ESTERASE: NEGATIVE
NITRITE UR QL STRIP.AUTO: NEGATIVE
PH UR STRIP: 6.5 [PH] (ref 5–7)
POTASSIUM SERPL-SCNC: 3.7 MMOL/L (ref 3.6–5)
PROT SERPL-MCNC: 7.9 G/DL (ref 6.3–8.2)
PROT UR STRIP-MCNC: NEGATIVE MG/DL
RBC #/AREA URNS HPF: ABNORMAL #/HPF
SODIUM SERPL-SCNC: 142 MMOL/L (ref 137–145)
SP GR UR REFRACTOMETRY: 1.01 (ref 1–1.03)
TRIGL SERPL-MCNC: 169 MG/DL (ref 0–200)
UROBILINOGEN UR QL STRIP.AUTO: NEGATIVE
VLDLC SERPL CALC-MCNC: 34 MG/DL
WBC URNS QL MICRO: 0 #/HPF

## 2019-09-23 RX ORDER — METHYLPREDNISOLONE 4 MG/1
4 TABLET ORAL
Qty: 1 DOSE PACK | Refills: 0 | Status: SHIPPED | OUTPATIENT
Start: 2019-09-23 | End: 2021-01-26 | Stop reason: ALTCHOICE

## 2019-09-23 NOTE — PROGRESS NOTES
Subjective:  Ms. William Cao is a pleasant 52year old lady who comes in today for three month follow up of her medical problems. 1. ADHD, managed effectively on Methylphenidate ER 36 mg daily. This allows her to remain focused at work. 2. Chronic anxiety, managed on Klonopin 0.25 mg nightly as needed. 3. Hypertension, managed on Lisinopril/HCTZ 10/12.5 mg daily. She denies any headaches, dizziness or blurred vision. However, in the last week or so she has had some pressure sensation in her right ear. She does have a long history of allergic rhinitis, for which she chronically takes Zyrtec. She has had a little more congestion recently. She denies any chest pain or palpitations. Denies SOB, cough, wheezing, PND or orthopnea. Denies ankle edema. Past Medical History:   Diagnosis Date    Hypertension     Ill-defined condition     PCOS     Past Surgical History:   Procedure Laterality Date    HX GYN      cyst removal    HX ORTHOPAEDIC      L Knee    HX ROTATOR CUFF REPAIR      right    HX TONSILLECTOMY         Current Outpatient Medications on File Prior to Visit   Medication Sig Dispense Refill    methylphenidate ER 36 mg 24 hr tab Take 1 Tab by mouth every morning. Max Daily Amount: 36 mg. 30 Tab 0    clonazePAM (KLONOPIN) 0.5 mg tablet Take 1 Tab by mouth nightly as needed (anxiety). Max Daily Amount: 0.5 mg. 30 Tab 1    albuterol (PROVENTIL HFA, VENTOLIN HFA, PROAIR HFA) 90 mcg/actuation inhaler INHALE 2 PUFFS BY MOUTH EVERY 4 HOURS AS NEEDED FOR WHEEZE 8.5 Inhaler 6    lisinopril-hydroCHLOROthiazide (PRINZIDE, ZESTORETIC) 10-12.5 mg per tablet Take 1 Tab by mouth daily. 90 Tab 3    primidone (MYSOLINE) 50 mg tablet Take 1 Tab by mouth daily. 90 Tab 3    cholecalciferol (VITAMIN D3) 2,000 unit cap capsule Take  by mouth two (2) times a day.  olopatadine (PATANOL) 0.1 % ophthalmic solution Administer 2 Drops to both eyes two (2) times a day.  15 mL 6    azelastine-fluticasone 137-50 mcg/spray spry 1 [place] by Nasal route daily. 1 g 6    cetirizine (ZYRTEC) 10 mg tablet Take  by mouth.  multivitamin (ONE A DAY) tablet Take 1 Tab by mouth daily. Indications: Flinstone chewable      omeprazole (PRILOSEC) 40 mg capsule Take 40 mg by mouth daily.  methocarbamol (ROBAXIN) 500 mg tablet       meloxicam (MOBIC) 15 mg tablet Take 1 Tab by mouth daily. 30 Tab 0    doxycycline (VIBRAMYCIN) 100 mg capsule        No current facility-administered medications on file prior to visit. Allergies   Allergen Reactions    Pcn [Penicillins] Anaphylaxis    Zithromax [Azithromycin] Anaphylaxis   Physical Examination:  GENERAL:  Pleasant, young lady in no acute distress. She is alert and oriented. Answers my questions appropriately. VITALS:  BP: 132/72. P: 78.  R: 16.  T: 97.9. O2 sat: 98.  WT: 225, down by 19 lbs since last November. HEENT:  Normocephalic, atraumatic. Right TM normal.  Left TM is mildly injected. Mouth mucosa pink. Tongue midline. Pharynx normal.  NECK:  Supple without adenopathy, thyromegaly or carotid bruits. CHEST:  Lungs clear to auscultation, no rales or wheezes. CV:  Heart regular rhythm without murmur. EXTREMITIES:  No edema or calf tenderness. Distal pulses were present. Impression:  1. Hypertension, stable. 2. ADHD. 3. Chronic anxiety. 4. Left ear pain, suspect this is more inner ear dysfunction. Plan:  1. She will continue with her current regimen and I will see her back in three months for updated H&P.  2. In terms of her ears, I have asked her to start using Flonase along with Zyrtec. Should that fail to improve then we will consider a course of steroids. She is fully agreeable. 3. She was fasting this morning so it was opted to do all of her lab studies and I will call her as soon as I have her results. Subjective:  Mr. Harika Coleman is a pleasant 52year old lady who comes in today for three month follow up of her ADHD.   She is currently managed on Methylphenidate ER 36 mg daily. This has allowed her to remain focused at work. Chronic anxiety is effectively managed on Klonopin 0.25 mg nightly. Review of Systems:  Today she specifically denies headaches, dizziness or blurred vision. She denies any chest pain or palpitations. Denies SOB, cough, wheezing, PND or orthopnea. Denies ankle edema. Physical Examination:  GENERAL:  Pleasant lady in no acute distress. She is alert and oriented. VITALS:  BP: 132/72. P: 78.  R: 16.  T: 97.9. O2 sat: 98%. HEENT:  Normocephalic, atraumatic. NECK:  Supple without adenopathy. CHEST:  Lungs clear to auscultation, no rales or wheezes. CV:  Heart regular rhythm without murmur or gallop. ABDOMEN  Soft, NT, no OM or masses. EXTREMITIES:  No edema or calf tenderness. Distal pulses were present. Impression:  4. ADHD. 5. Chronic anxiety. Plan:  4. She was given a refill on Methylphenidate ER 36 mg daily.     5. I will see her back in three months, at which time we will update her H&P.

## 2019-09-23 NOTE — LETTER
Name:Nick SeoLeeviatrav Viagogo B:[birth year] MR #:[id number] Provider Name:Roslyn Goodwin NP  
*UZHC-160* BSMG-491 (5/16) Page 1 of 5 Initial Daily [place] CONTROLLED SUBSTANCE AGREEMENT I may be prescribed medications that are controlled substances as part  of my treatment plan for management of my medical condition(s). The goal of my treatment plan is to maintain and/or improve my health and wellbeing. Because controlled substances have an increased risk of abuse or harm, continual re-evaluation is needed determine if the goals of my treatment plan are being met for my safety and the safety of others. I, Shiv Dickerson  am entering into this Controlled Substance Agreement with my provider, Werner Jimenez NP at [address] [place] [address] . I understand that successful treatment requires mutual trust and honesty between me and my provider. I understand that there are state and federal laws and regulations which apply to the medications that my provider may prescribe that must be followed. I understand there are risks and benefits ts of taking the medicines that my provider may prescribe. I understand and agree that following this Agreement is necessary in continuing my provider-patient relationship and success of my treatment plan. As a part of my treatment plan, I agree to the following: COMMUNICATION: 
 
1. I will communicate fully with my provider about my medical condition(s), including the effect on my daily life and how well my medications are helping. I will tell my provider all of the medications that I take for any reason, including medications I receive from another health care provider, and will notify my provider about all issues, problems or concerns, including any side effects, which may be related to my medications.  
 
I understand that this information allows my provider to adjust my treatment plan to help manage my medical condition. I understand that this information will become part of my permanent medical record. 2. I will notify my provider if I have a history of alcohol/drug misuse/addiction or if I have had treatment for alcohol/drug addiction in the past, or if I have a new problem with or concern about alcohol/drug use/addiction, because this increases the likelihood of high risk behaviors and may lead to serious medical conditions. 3. Females Only: I will notify my provider if I am or become pregnant, or if I intend to become pregnant, or if I intend to breastfeed. I understand that communication of these issues with my provider is important, due to possible effects my medication could have on an unborn fetus or breastfeeding child. Name:. Austin Madison PSK:[birth year] MR #:[id number] Provider Name:Gricelda Goodwin NP  
*EYKF-065* BSMG-491 (5/16) Page 2 of 5 Initial SMARTworks MISUSE OF MEDICATIONS / DRUGS: 
 
1. I agree to take all controlled substances as prescribed, and will not misuse or abuse any controlled substances prescribed by my provider. For my safety, I will not increase the amount of medicine I take without first talking with and getting permission from my provider. 2. If I have a medical emergency, another health care provider may prescribe me medication. If I seek emergency treatment, I will notify my provider within seventy-two (72) hours. 3. I understand that my provider may discuss my use and/or possible misuse/abuse of controlled substances and alcohol, as appropriate, with any health care provider involved in my care, pharmacist or legal authority. ILLEGAL DRUGS: 
 
1. I will not use illegal drugs of any kind, including but not limited to marijuana, heroin, cocaine, or any prescription drug which is not prescribed to me.  
 
DRUG DIVERSION / PRESCRIPTION FRAUD: 
 
 1. I will not share, sell, trade, give away, or otherwise misuse my prescriptions or medications. 2. I will not alter any prescriptions provided to me by my provider. SINGLE PROVIDER: 
 
1. I agree that all controlled substances that I take will be prescribed only by my provider (or his/her covering provider) under this Agreement. This agreement does not prevent me from seeking emergency medical treatment or receiving pain management related to a surgery. PROTECTING MEDICATIONS: 
 
1. I am responsible for keeping my prescriptions and medications in a safe and secure place including safeguarding them from loss or theft. I understand that lost, stolen or damaged/destroyed prescriptions or medications will not be replaced. Name:. René Rust [facility]:4/51/2002 MR #:[id number] Provider Name:Zach Goodwin, SERGEY  
*YMAB-690* BSMG-491 (5/16) Page 3 of 5 Initial [place] PRESCRIPTION RENEWALS/REFILLS: 
 
 
1. I authorize my provider and my pharmacy to cooperate fully with any local, state, or federal law enforcement agency in the investigation of any possible misuse, sale, or other diversion of my controlled substance prescriptions or medications. RISKS: 
 
 
1. I understand that if I do not adhere to this Agreement in any way, my provider may change my prescriptions, stop prescribing controlled substances or end our provider-patient relationship. 2. If my provider decides to stop prescribing medication, or decides to end our provider-patient relationship,my provider may require that I taper my medications slowly. If necessary, my provider may also provide a prescription for other medications to treat my withdrawal symptoms. UNDERSTANDING THIS AGREEMENT: 
 
I understand that my provider may adjust or stop my prescriptions for controlled substances based on my medical condition and my treatment plan. I understand that this Agreement does not guarantee that I will be prescribed medications or controlled substances. I understand that controlled substances may be just one part of my treatment plan. My initial on each page and my signature below shows that I have read each page of this Agreement, I have had an opportunity to ask questions, and all of my questions have been answered to my satisfaction by my provider. By signing below, I agree to comply with this Agreement, and I understand that if I do not follow the Agreements listed above, my provider may stop 
 
 
 
_________________________________________  Date/Time 9/23/2019 9:50 AM   
             (Patient Signature)

## 2019-09-23 NOTE — PATIENT INSTRUCTIONS
Attention Deficit Hyperactivity Disorder (ADHD) in Adults: Care Instructions  Your Care Instructions    Attention deficit hyperactivity disorder, or ADHD, is a condition that makes it hard to pay attention. So you may have problems when you try to focus, get organized, and finish tasks. It might make you more active than other people. Or you might do things without thinking first.  ADHD is very common. It usually starts in early childhood. Many adults don't realize they have it until their children are diagnosed. Then they become aware of their own symptoms. Doctors don't know what causes ADHD. But it often runs in families. ADHD can be treated with medicines, behavior training, and counseling. Treatment can improve your life. Follow-up care is a key part of your treatment and safety. Be sure to make and go to all appointments, and call your doctor if you are having problems. It's also a good idea to know your test results and keep a list of the medicines you take. How can you care for yourself at home? · Learn all you can about ADHD. This will help you and your family understand it better. · Take your medicines exactly as prescribed. Call your doctor if you think you are having a problem with your medicine. You will get more details on the specific medicines your doctor prescribes. · If you miss a dose of your medicine, do not take an extra dose. · If your doctor suggests counseling, find a counselor you like and trust. Talk openly and honestly. Be willing to make some changes. · Find a support group for adults with ADHD. Talking to others with the same problems can help you feel better. It can also give you ideas about how to best cope with the condition. · Get rid of distractions at your work space. Keep your desk clean. Try not to face a window or busy hallway. · Use files, planners, and other tools to keep you organized. · Limit use of alcohol, and do not use illegal drugs.  People with ADHD tend to develop substance use disorder more easily than others. Tell your doctor if you need help to quit. Counseling, support groups, and sometimes medicines can help you stay free of alcohol or drugs. · Get at least 30 minutes of physical activity on most days of the week. Exercise has been shown to help people cope with ADHD. Walking is a good choice. You also may want to do other activities, such as running, swimming, cycling, or playing tennis or team sports. When should you call for help? Watch closely for changes in your health, and be sure to contact your doctor if:    · You feel sad a lot or cry all the time.     · You have trouble sleeping, or you sleep too much.     · You find it hard to concentrate, make decisions, or remember things.     · You change how you normally eat.     · You feel guilty for no reason. Where can you learn more? Go to http://nella-muriel.info/. Enter B196 in the search box to learn more about \"Attention Deficit Hyperactivity Disorder (ADHD) in Adults: Care Instructions. \"  Current as of: May 28, 2019  Content Version: 12.2  © 9353-2466 [employer]. Care instructions adapted under license by [employer] (which disclaims liability or warranty for this information). If you have questions about a medical condition or this instruction, always ask your healthcare professional. [address] any warranty or liability for your use of this information.

## 2019-09-23 NOTE — PROGRESS NOTES
Reviewed record in preparation for visit and have obtained necessary documentation. Identified pt with two pt identifiers(name and ). Chief Complaint   Patient presents with    Follow-up    Hypertension        Coordination of Care Questionnaire:  :     1) Have you been to an emergency room, urgent care clinic since your last visit? No     Hospitalized since your last visit? No               2) Have you seen or consulted any other health care providers outside of [address] [place] [address] since your last visit?  Yes Dr Raymona Babinski

## 2019-09-24 LAB
25(OH)D3 SERPL-MCNC: 44 NG/ML (ref 30–96)
BASOPHILS # BLD AUTO: 0.1 X10E3/UL (ref 0–0.2)
BASOPHILS NFR BLD AUTO: 1 %
EOSINOPHIL # BLD AUTO: 0.2 X10E3/UL (ref 0–0.4)
EOSINOPHIL NFR BLD AUTO: 1 %
ERYTHROCYTE [DISTWIDTH] IN BLOOD BY AUTOMATED COUNT: 13 % (ref 12.3–15.4)
HCT VFR BLD AUTO: 48.3 % (ref 34–46.6)
HGB BLD-MCNC: 16.4 G/DL (ref 11.1–15.9)
IMM GRANULOCYTES # BLD AUTO: 0 X10E3/UL (ref 0–0.1)
IMM GRANULOCYTES NFR BLD AUTO: 0 %
LYMPHOCYTES # BLD AUTO: 2.6 X10E3/UL (ref 0.7–3.1)
LYMPHOCYTES NFR BLD AUTO: 23 %
MCH RBC QN AUTO: 28.3 PG (ref 26.6–33)
MCHC RBC AUTO-ENTMCNC: 34 G/DL (ref 31.5–35.7)
MCV RBC AUTO: 83 FL (ref 79–97)
MONOCYTES # BLD AUTO: 0.5 X10E3/UL (ref 0.1–0.9)
MONOCYTES NFR BLD AUTO: 5 %
NEUTROPHILS # BLD AUTO: 7.9 X10E3/UL (ref 1.4–7)
NEUTROPHILS NFR BLD AUTO: 70 %
PLATELET # BLD AUTO: 253 X10E3/UL (ref 150–450)
RBC # BLD AUTO: 5.8 X10E6/UL (ref 3.77–5.28)
T4 FREE SERPL-MCNC: 0.9 NG/DL (ref 0.58–2.3)
TSH SERPL DL<=0.05 MIU/L-ACNC: 0.66 UIU/ML (ref 0.34–5.6)
WBC # BLD AUTO: 11.3 X10E3/UL (ref 3.4–10.8)

## 2019-10-07 ENCOUNTER — APPOINTMENT (OUTPATIENT)
Dept: INTERNAL MEDICINE CLINIC | Age: 49
End: 2019-10-07

## 2019-10-07 DIAGNOSIS — D72.829 LEUKOCYTOSIS, UNSPECIFIED TYPE: ICD-10-CM

## 2019-10-08 LAB
BASOPHILS # BLD AUTO: 0.1 X10E3/UL (ref 0–0.2)
BASOPHILS NFR BLD AUTO: 1 %
EOSINOPHIL # BLD AUTO: 0.1 X10E3/UL (ref 0–0.4)
EOSINOPHIL NFR BLD AUTO: 1 %
ERYTHROCYTE [DISTWIDTH] IN BLOOD BY AUTOMATED COUNT: 13.2 % (ref 12.3–15.4)
HCT VFR BLD AUTO: 48.7 % (ref 34–46.6)
HGB BLD-MCNC: 16.1 G/DL (ref 11.1–15.9)
IMM GRANULOCYTES # BLD AUTO: 0 X10E3/UL (ref 0–0.1)
IMM GRANULOCYTES NFR BLD AUTO: 0 %
LYMPHOCYTES # BLD AUTO: 2.3 X10E3/UL (ref 0.7–3.1)
LYMPHOCYTES NFR BLD AUTO: 21 %
MCH RBC QN AUTO: 28.4 PG (ref 26.6–33)
MCHC RBC AUTO-ENTMCNC: 33.1 G/DL (ref 31.5–35.7)
MCV RBC AUTO: 86 FL (ref 79–97)
MONOCYTES # BLD AUTO: 0.6 X10E3/UL (ref 0.1–0.9)
MONOCYTES NFR BLD AUTO: 6 %
NEUTROPHILS # BLD AUTO: 7.9 X10E3/UL (ref 1.4–7)
NEUTROPHILS NFR BLD AUTO: 71 %
PLATELET # BLD AUTO: 305 X10E3/UL (ref 150–450)
RBC # BLD AUTO: 5.66 X10E6/UL (ref 3.77–5.28)
WBC # BLD AUTO: 11.1 X10E3/UL (ref 3.4–10.8)

## 2019-10-15 DIAGNOSIS — G47.00 INSOMNIA, UNSPECIFIED TYPE: ICD-10-CM

## 2019-10-15 DIAGNOSIS — F90.9 ATTENTION DEFICIT HYPERACTIVITY DISORDER (ADHD), UNSPECIFIED ADHD TYPE: ICD-10-CM

## 2019-10-15 RX ORDER — CLONAZEPAM 0.5 MG/1
0.5 TABLET ORAL
Qty: 30 TAB | Refills: 1 | Status: SHIPPED | OUTPATIENT
Start: 2019-10-15 | End: 2019-11-18 | Stop reason: SDUPTHER

## 2019-10-15 RX ORDER — METHYLPHENIDATE HYDROCHLORIDE 36 MG/1
36 TABLET ORAL
Qty: 30 TAB | Refills: 0 | Status: SHIPPED | OUTPATIENT
Start: 2019-10-15 | End: 2019-10-17 | Stop reason: SDUPTHER

## 2019-10-15 NOTE — TELEPHONE ENCOUNTER
PCP: Jayjay Contreras NP    Last appt: 10/7/2019  Future Appointments   Date Time Provider Richie David   12/23/2019 10:30 AM Eunice Goodwin NP [facility] SCHED       Requested Prescriptions     Pending Prescriptions Disp Refills    clonazePAM (KLONOPIN) 0.5 mg tablet 30 Tab 1     Sig: Take 1 Tab by mouth nightly as needed (anxiety). Max Daily Amount: 0.5 mg.    methylphenidate ER 36 mg 24 hr tab 30 Tab 0     Sig: Take 1 Tab by mouth every morning. Max Daily Amount: 36 mg.

## 2019-10-15 NOTE — TELEPHONE ENCOUNTER
Requested Prescriptions     Pending Prescriptions Disp Refills    clonazePAM (KLONOPIN) 0.5 mg tablet 30 Tab 1     Sig: Take 1 Tab by mouth nightly as needed (anxiety). Max Daily Amount: 0.5 mg.    methylphenidate ER 36 mg 24 hr tab 30 Tab 0     Sig: Take 1 Tab by mouth every morning. Max Daily Amount: 36 mg.        Last Refill: Clonazepam 8/12/19 / Methylphenidate 9/23/19  Next Appointment:12/23/19

## 2019-10-17 DIAGNOSIS — F90.9 ATTENTION DEFICIT HYPERACTIVITY DISORDER (ADHD), UNSPECIFIED ADHD TYPE: ICD-10-CM

## 2019-10-17 RX ORDER — METHYLPHENIDATE HYDROCHLORIDE 36 MG/1
36 TABLET ORAL
Qty: 30 TAB | Refills: 0 | Status: SHIPPED | OUTPATIENT
Start: 2019-10-17 | End: 2019-11-18 | Stop reason: SDUPTHER

## 2019-10-17 NOTE — TELEPHONE ENCOUNTER
Requested Prescriptions     Pending Prescriptions Disp Refills    methylphenidate ER 36 mg 24 hr tab 30 Tab 0     Sig: Take 1 Tab by mouth every morning. Max Daily Amount: 36 mg.        Last Refill: 9/16/19  Next Appointment:12/23/19

## 2019-10-17 NOTE — TELEPHONE ENCOUNTER
Prescription printed off on 10/14/19 and cannot be located. [facility] reprint and call patient when ready. [facility] was called and they do not have this prescription.

## 2019-10-17 NOTE — TELEPHONE ENCOUNTER
Clonazepam and Methylphenidate  prescriptions phoned to [facility]/pharmacy #[id number]- [phone], [address]

## 2019-11-18 DIAGNOSIS — F90.9 ATTENTION DEFICIT HYPERACTIVITY DISORDER (ADHD), UNSPECIFIED ADHD TYPE: ICD-10-CM

## 2019-11-18 DIAGNOSIS — G47.00 INSOMNIA, UNSPECIFIED TYPE: ICD-10-CM

## 2019-11-18 RX ORDER — METHYLPHENIDATE HYDROCHLORIDE 36 MG/1
36 TABLET ORAL
Qty: 30 TAB | Refills: 0 | Status: SHIPPED | OUTPATIENT
Start: 2019-11-18 | End: 2019-12-23 | Stop reason: SDUPTHER

## 2019-11-18 RX ORDER — CLONAZEPAM 0.5 MG/1
0.5 TABLET ORAL
Qty: 30 TAB | Refills: 1 | Status: SHIPPED | OUTPATIENT
Start: 2019-11-18 | End: 2020-02-16

## 2019-11-18 NOTE — TELEPHONE ENCOUNTER
PCP: Kellie Najera NP    Last appt: 10/7/2019  Future Appointments   Date Time Provider Richie David   12/23/2019 10:30 AM Esperanza Goodwin NP [facility] SCHED       Requested Prescriptions     Pending Prescriptions Disp Refills    methylphenidate ER 36 mg 24 hr tab 30 Tab 0     Sig: Take 1 Tab by mouth every morning. Max Daily Amount: 36 mg.    clonazePAM (KLONOPIN) 0.5 mg tablet 30 Tab 1     Sig: Take 1 Tab by mouth nightly as needed (anxiety).  Max Daily Amount: 0.5 mg.

## 2019-12-23 ENCOUNTER — OFFICE VISIT (OUTPATIENT)
Dept: INTERNAL MEDICINE CLINIC | Age: 49
End: 2019-12-23

## 2019-12-23 VITALS
OXYGEN SATURATION: 95 % | WEIGHT: 225.9 LBS | HEART RATE: 86 BPM | RESPIRATION RATE: 16 BRPM | HEIGHT: 62 IN | DIASTOLIC BLOOD PRESSURE: 76 MMHG | TEMPERATURE: 98.4 F | BODY MASS INDEX: 41.57 KG/M2 | SYSTOLIC BLOOD PRESSURE: 122 MMHG

## 2019-12-23 DIAGNOSIS — Z23 ENCOUNTER FOR IMMUNIZATION: ICD-10-CM

## 2019-12-23 DIAGNOSIS — E78.2 MIXED HYPERLIPIDEMIA: ICD-10-CM

## 2019-12-23 DIAGNOSIS — F90.9 ATTENTION DEFICIT HYPERACTIVITY DISORDER (ADHD), UNSPECIFIED ADHD TYPE: ICD-10-CM

## 2019-12-23 DIAGNOSIS — R53.83 FATIGUE, UNSPECIFIED TYPE: ICD-10-CM

## 2019-12-23 DIAGNOSIS — G47.00 INSOMNIA, UNSPECIFIED TYPE: ICD-10-CM

## 2019-12-23 DIAGNOSIS — Z00.00 PHYSICAL EXAM: ICD-10-CM

## 2019-12-23 DIAGNOSIS — R73.9 HYPERGLYCEMIA: ICD-10-CM

## 2019-12-23 DIAGNOSIS — E55.9 VITAMIN D DEFICIENCY: ICD-10-CM

## 2019-12-23 DIAGNOSIS — M25.512 ACUTE PAIN OF LEFT SHOULDER: ICD-10-CM

## 2019-12-23 DIAGNOSIS — F41.9 ANXIETY: Primary | ICD-10-CM

## 2019-12-23 LAB
A-G RATIO,AGRAT: 1.4 RATIO
ALBUMIN SERPL-MCNC: 4.5 G/DL (ref 3.9–5.4)
ALP SERPL-CCNC: 96 U/L (ref 38–126)
ALT SERPL-CCNC: 14 U/L (ref 0–35)
ANION GAP SERPL CALC-SCNC: 15 MMOL/L
AST SERPL W P-5'-P-CCNC: 21 U/L (ref 14–36)
BILIRUB SERPL-MCNC: 0.5 MG/DL (ref 0.2–1.3)
BILIRUB UR QL: NEGATIVE
BUN SERPL-MCNC: 11 MG/DL (ref 7–17)
BUN/CREATININE RATIO,BUCR: 16 RATIO
CALCIUM SERPL-MCNC: 10.1 MG/DL (ref 8.4–10.2)
CHLORIDE SERPL-SCNC: 101 MMOL/L (ref 98–107)
CHOL/HDL RATIO,CHHD: 5 RATIO (ref 0–4)
CHOLEST SERPL-MCNC: 232 MG/DL (ref 0–200)
CLARITY: CLEAR
CO2 SERPL-SCNC: 23 MMOL/L (ref 22–32)
COLOR UR: NORMAL
CREAT SERPL-MCNC: 0.7 MG/DL (ref 0.7–1.2)
GLOBULIN,GLOB: 3.2
GLUCOSE 24H UR-MRATE: NEGATIVE G/(24.H)
GLUCOSE SERPL-MCNC: 98 MG/DL (ref 65–105)
HDLC SERPL-MCNC: 47 MG/DL (ref 35–130)
HGB UR QL STRIP: NEGATIVE
KETONES UR QL STRIP.AUTO: NEGATIVE
LDL/HDL RATIO,LDHD: 3 RATIO
LDLC SERPL CALC-MCNC: 145 MG/DL (ref 0–130)
LEUKOCYTE ESTERASE: NEGATIVE
NITRITE UR QL STRIP.AUTO: NEGATIVE
PH UR STRIP: 6.5 [PH] (ref 5–7)
POTASSIUM SERPL-SCNC: 4.2 MMOL/L (ref 3.6–5)
PROT SERPL-MCNC: 7.7 G/DL (ref 6.3–8.2)
PROT UR STRIP-MCNC: NEGATIVE MG/DL
RBC #/AREA URNS HPF: 0 #/HPF
SODIUM SERPL-SCNC: 139 MMOL/L (ref 137–145)
SP GR UR REFRACTOMETRY: 1.01 (ref 1–1.03)
TRIGL SERPL-MCNC: 201 MG/DL (ref 0–200)
UROBILINOGEN UR QL STRIP.AUTO: NEGATIVE
VLDLC SERPL CALC-MCNC: 40 MG/DL
WBC URNS QL MICRO: 0 #/HPF

## 2019-12-23 RX ORDER — METHYLPHENIDATE HYDROCHLORIDE 36 MG/1
36 TABLET ORAL
Qty: 30 TAB | Refills: 0 | Status: SHIPPED | OUTPATIENT
Start: 2019-12-23 | End: 2020-01-23 | Stop reason: SDUPTHER

## 2019-12-23 NOTE — PATIENT INSTRUCTIONS

## 2019-12-23 NOTE — PROGRESS NOTES
Isela Mobley is a 52 y.o. female     Chief Complaint   Patient presents with    Behavioral Problem     ADHD 3 month follow up       Visit Vitals  /76 (BP 1 Location: Right arm, BP Patient Position: Sitting)   Pulse 86   Temp 98.4 °F (36.9 °C) (Oral)   Resp 16   Ht 5' 2.25\" (1.581 m)   Wt 225 lb 14.4 oz (102.5 kg)   LMP 12/19/2019 (Exact Date)   SpO2 95%   BMI 40.99 kg/m²       Health Maintenance Due   Topic Date Due    Pneumococcal 0-64 years (1 of 1 - PPSV23) [birth year]/1976    Influenza Age 5 to Adult  08/01/2019       1. Have you been to the ER, urgent care clinic since your last visit? Hospitalized since your last visit? No    2. Have you seen or consulted any other health care providers outside of the [address] [place] [address] since your last visit? Include any pap smears or colon screening. No      Administered influenza vaccine in left deltoid patient tolerated injection well.     Lot#: [id number]    Exp:6/30/2020    SDB:[id number]

## 2019-12-23 NOTE — PROGRESS NOTES
Subjective:  Ms. Scarlet Reyes is a 52year old lady who comes in today for her updated history and physical.      History of Present Illness:  She is in need of getting referred back to Dr. Julian Lomeli for her left shoulder pain. She has finished a course of physical therapy without much improvement, so she is in need of getting a steroid injection. Past Medical History:   Diagnosis Date    Hypertension     Ill-defined condition     PCOS     Past Surgical History:   Procedure Laterality Date    HX GYN      cyst removal    HX ORTHOPAEDIC      L Knee    HX ROTATOR CUFF REPAIR      right    HX TONSILLECTOMY         Current Outpatient Medications on File Prior to Visit   Medication Sig Dispense Refill    clonazePAM (KLONOPIN) 0.5 mg tablet Take 1 Tab by mouth nightly as needed (anxiety). Max Daily Amount: 0.5 mg. 30 Tab 1    albuterol (PROVENTIL HFA, VENTOLIN HFA, PROAIR HFA) 90 mcg/actuation inhaler INHALE 2 PUFFS BY MOUTH EVERY 4 HOURS AS NEEDED FOR WHEEZE 8.5 Inhaler 6    lisinopril-hydroCHLOROthiazide (PRINZIDE, ZESTORETIC) 10-12.5 mg per tablet Take 1 Tab by mouth daily. 90 Tab 3    primidone (MYSOLINE) 50 mg tablet Take 1 Tab by mouth daily. 90 Tab 3    cholecalciferol (VITAMIN D3) 2,000 unit cap capsule Take  by mouth two (2) times a day.  olopatadine (PATANOL) 0.1 % ophthalmic solution Administer 2 Drops to both eyes two (2) times a day. 15 mL 6    azelastine-fluticasone 137-50 mcg/spray spry 1 [place] by Nasal route daily. 1 g 6    cetirizine (ZYRTEC) 10 mg tablet Take  by mouth.  multivitamin (ONE A DAY) tablet Take 1 Tab by mouth daily. Indications: Flinstone chewable      omeprazole (PRILOSEC) 40 mg capsule Take 40 mg by mouth daily.  [DISCONTINUED] methylphenidate ER 36 mg 24 hr tab Take 1 Tab by mouth every morning. Max Daily Amount: 36 mg. 30 Tab 0    methylPREDNISolone (MEDROL DOSEPACK) 4 mg tablet Take 1 Tab by mouth Specific Days and Specific Times.  1 Dose Pack 0    methocarbamol (ROBAXIN) 500 mg tablet       meloxicam (MOBIC) 15 mg tablet Take 1 Tab by mouth daily. 30 Tab 0    doxycycline (VIBRAMYCIN) 100 mg capsule        No current facility-administered medications on file prior to visit. Allergies   Allergen Reactions    Pcn [Penicillins] Anaphylaxis    Zithromax [Azithromycin] Anaphylaxis   Past Medical History/Surgeries:    1. Tonsillectomy. 2. Left knee arthroscopy. 3. Right rotator cuff repair. 4. Multiple myringotomies. 5. Excision of left ovarian cyst.  6. Excision of sty, right eye. Illnesses:  1. Hypertension. 2. Hyperlipidemia. 3. ADHD. 4. Nonessential tremors. 5. PTSD. 6. PCOD. 7. Chronic anxiety. Family History:  Father  at 29 of a massive heart attack. Mother  at 29 of ruptured cerebral aneurysm. Four sisters are living, one has had breast cancer, but is doing well. Social History:  She is . She lives alone. She works in [employer] for [employer]. She has no children. Allergies:  Penicillin and Zithromax. Medications:  1. Methylphenidate ER 36 mg daily. 2. Clonazepam 0.5 mg nightly as needed. 3. Albuterol inhaler prn.  4. Lisinopril/Hydrochlorothiazide 10/12.5 mg daily. 5. Primidone 50 mg daily. 6. Vitamin D3 2,000 units twice daily. 7. Zyrtec 10 mg daily. 8. Multivitamin daily. 9. Omeprazole 40 mg daily. Habits:  She smokes one pack of cigarettes per day. She does not drink. Review of Systems:  HEENT:  Denies any headaches, dizziness or blurred vision. CVR:  Denies any chest pain or palpitations. Denies any shortness of breath, cough, wheezing, PND or orthopnea. Denies any ankle edema. GI:  Appetite is good, weight is stable. Does have a normal bowel pattern without presence of blood in stools or melena. :  Denies any urinary symptoms. GYN:  She is in need of getting a pelvic and pap, as well as mammogram.     Physical Examination:  GENERAL:  Pleasant lady in no acute distress. She is alert and oriented. She answers my questions appropriately. VITALS:  Blood Pressure:  122/76. Pulse:  86. Respirations:  16. Temperature:  98.4. O2 sat:  95.  Weight:  225 pounds. HEENT:  Normocephalic, atraumatic. PERRLA, EOMI. TMs normal.  Mouth mucosa pink. Tongue midline. Pharynx normal.  NECK:  Supple without adenopathy, thyromegaly or carotid bruits. CHEST:  Lungs clear to auscultation, no rales or wheezes. CV:  Heart regular rhythm without murmur or gallop. ABDOMEN:  Soft, non tender, no organomegaly or masses. No CVA tenderness. No lymphadenopathy. EXTREMITIES:  No edema or calf tenderness. Distal pulses were present. NEUROLOGIC:  Cranial nerves II-XII intact. Excellent strength in the upper and lower extremities against resistance. Sensation is preserved. Romberg is negative. Reflexes 1+ and symmetrical.  MSK:  She does have pain on motion of her left shoulder, mostly anteriorly. Impression:  1. Left shoulder pain. 2. Hypertension. 3. Hyperlipidemia. 4. ADHD. 5. Chronic depression/anxiety. 6. Nonessential tremor. 7. Marked obesity. 8. PCOD. Plan:  1. She was fasting this morning so therefore it was opted to do all of her lab studies. I will call her as soon as I have her results. Otherwise she will continue with her current regimen and I will see her every six months. 2. We discussed the importance of a prudent diet, weight loss and exercise to keep BMI within acceptable level. 3. While in the office we did give her her flu vaccine. 4. She is referred back to the [facility] for pelvic and pap and mammogram.  5. She will be due for her first colonoscopy in May of 2020.  6. She is referred back to Dr. Sherren Shone for her left shoulder pain.

## 2019-12-24 LAB
BASOPHILS # BLD AUTO: 0.1 X10E3/UL (ref 0–0.2)
BASOPHILS NFR BLD AUTO: 1 %
EOSINOPHIL # BLD AUTO: 0.1 X10E3/UL (ref 0–0.4)
EOSINOPHIL NFR BLD AUTO: 1 %
ERYTHROCYTE [DISTWIDTH] IN BLOOD BY AUTOMATED COUNT: 13 % (ref 12.3–15.4)
HCT VFR BLD AUTO: 47.5 % (ref 34–46.6)
HGB BLD-MCNC: 16.3 G/DL (ref 11.1–15.9)
IMM GRANULOCYTES # BLD AUTO: 0.1 X10E3/UL (ref 0–0.1)
IMM GRANULOCYTES NFR BLD AUTO: 1 %
LYMPHOCYTES # BLD AUTO: 2 X10E3/UL (ref 0.7–3.1)
LYMPHOCYTES NFR BLD AUTO: 20 %
MCH RBC QN AUTO: 29.6 PG (ref 26.6–33)
MCHC RBC AUTO-ENTMCNC: 34.3 G/DL (ref 31.5–35.7)
MCV RBC AUTO: 86 FL (ref 79–97)
MONOCYTES # BLD AUTO: 0.5 X10E3/UL (ref 0.1–0.9)
MONOCYTES NFR BLD AUTO: 5 %
NEUTROPHILS # BLD AUTO: 7.3 X10E3/UL (ref 1.4–7)
NEUTROPHILS NFR BLD AUTO: 72 %
PLATELET # BLD AUTO: 276 X10E3/UL (ref 150–450)
RBC # BLD AUTO: 5.51 X10E6/UL (ref 3.77–5.28)
WBC # BLD AUTO: 10 X10E3/UL (ref 3.4–10.8)

## 2019-12-26 LAB
25(OH)D3 SERPL-MCNC: 41 NG/ML (ref 30–96)
T4 FREE SERPL-MCNC: 0.89 NG/DL (ref 0.58–2.3)
TSH SERPL DL<=0.05 MIU/L-ACNC: 0.54 UIU/ML (ref 0.34–5.6)

## 2019-12-27 LAB — HBA1C MFR BLD HPLC: 4.9 % (ref 4–5.7)

## 2019-12-30 NOTE — PROGRESS NOTES
Lab results discussed with patient. Must watch diet low in cholesterol. Needs to exercise. Follow up 6 months.

## 2020-01-23 DIAGNOSIS — F90.9 ATTENTION DEFICIT HYPERACTIVITY DISORDER (ADHD), UNSPECIFIED ADHD TYPE: ICD-10-CM

## 2020-01-23 RX ORDER — AZELASTINE HYDROCHLORIDE, FLUTICASONE PROPIONATE 137; 50 UG/1; UG/1
1 SPRAY, METERED NASAL DAILY
Qty: 1 G | Refills: 6 | Status: SHIPPED | OUTPATIENT
Start: 2020-01-23 | End: 2020-12-15

## 2020-01-23 RX ORDER — METHYLPHENIDATE HYDROCHLORIDE 36 MG/1
36 TABLET ORAL
Qty: 30 TAB | Refills: 0 | Status: SHIPPED | OUTPATIENT
Start: 2020-01-23 | End: 2020-02-20 | Stop reason: SDUPTHER

## 2020-01-23 NOTE — TELEPHONE ENCOUNTER
PCP: Sinan Cho NP    Last appt: 2019  No future appointments. Requested Prescriptions     Pending Prescriptions Disp Refills    methylphenidate ER 36 mg 24 hr tab 30 Tab 0     Sig: Take 1 Tab by mouth every morning. Max Daily Amount: 36 mg.    azelastine-fluticasone 137-50 mcg/spray spry 1 g 6     Si Spray by Nasal route daily.        Prior labs and Blood pressures:  BP Readings from Last 3 Encounters:   19 122/76   19 132/72   05/15/19 118/80     Lab Results   Component Value Date/Time    Sodium 139 2019 12:05 PM    Potassium 4.2 2019 12:05 PM    Chloride 101 2019 12:05 PM    CO2 23.0 2019 12:05 PM    Anion gap 15 2019 12:05 PM    Glucose 98 2019 12:05 PM    BUN 11.0 2019 12:05 PM    Creatinine 0.7 2019 12:05 PM    BUN/Creatinine ratio 16 2019 12:05 PM    GFR est AA >60 2019 12:05 PM    GFR est non-AA >60 2019 12:05 PM    Calcium 10.1 2019 12:05 PM     Lab Results   Component Value Date/Time    Hemoglobin A1c 4.9 2019 12:04 PM    Hemoglobin A1c (POC) 5.1 2018 10:39 AM     Lab Results   Component Value Date/Time    Cholesterol, total 232 (H) 2019 12:05 PM    Cholesterol (POC) 206.0 (A) 2018 10:39 AM    HDL Cholesterol 47 2019 12:05 PM    HDL Cholesterol (POC) 48.0 2018 10:39 AM    LDL Cholesterol (POC) 109.0 2018 10:39 AM    LDL, calculated 145 (H) 2019 12:05 PM    VLDL 40 2019 12:05 PM    Triglyceride 201 (H) 2019 12:05 PM    Triglycerides (POC) 245.0 (A) 2018 10:39 AM    CHOL/HDL Ratio 5 (H) 2019 12:05 PM     Lab Results   Component Value Date/Time    VITAMIN D, 25-HYDROXY 41 2019 12:04 PM       Lab Results   Component Value Date/Time    TSH, 3rd generation 0.54 2019 12:04 PM

## 2020-01-23 NOTE — TELEPHONE ENCOUNTER
Requested Prescriptions     Pending Prescriptions Disp Refills    methylphenidate ER 36 mg 24 hr tab 30 Tab 0     Sig: Take 1 Tab by mouth every morning. Max Daily Amount: 36 mg.    azelastine-fluticasone 137-50 mcg/spray spry 1 g 6     Si Spray by Nasal route daily.        Last Refill: Methylphenidate 19/ azelastine-fluticasone 11/15/18  Next Appointment:no fu last seen 19 Risks/benefits discussed with patient/surrogate

## 2020-02-20 DIAGNOSIS — F90.9 ATTENTION DEFICIT HYPERACTIVITY DISORDER (ADHD), UNSPECIFIED ADHD TYPE: ICD-10-CM

## 2020-02-20 NOTE — TELEPHONE ENCOUNTER
PCP: Inocencia Apodaca NP    Last appt: 12/23/2019  No future appointments. Requested Prescriptions     Pending Prescriptions Disp Refills    methylphenidate ER 36 mg 24 hr tab 30 Tab 0     Sig: Take 1 Tab by mouth every morning.  Max Daily Amount: 36 mg.       Prior labs and Blood pressures:  BP Readings from Last 3 Encounters:   12/23/19 122/76   09/23/19 132/72   05/15/19 118/80     Lab Results   Component Value Date/Time    Sodium 139 12/23/2019 12:05 PM    Potassium 4.2 12/23/2019 12:05 PM    Chloride 101 12/23/2019 12:05 PM    CO2 23.0 12/23/2019 12:05 PM    Anion gap 15 12/23/2019 12:05 PM    Glucose 98 12/23/2019 12:05 PM    BUN 11.0 12/23/2019 12:05 PM    Creatinine 0.7 12/23/2019 12:05 PM    BUN/Creatinine ratio 16 12/23/2019 12:05 PM    GFR est AA >60 12/23/2019 12:05 PM    GFR est non-AA >60 12/23/2019 12:05 PM    Calcium 10.1 12/23/2019 12:05 PM     Lab Results   Component Value Date/Time    Hemoglobin A1c 4.9 12/23/2019 12:04 PM    Hemoglobin A1c (POC) 5.1 05/17/2018 10:39 AM     Lab Results   Component Value Date/Time    Cholesterol, total 232 (H) 12/23/2019 12:05 PM    Cholesterol (POC) 206.0 (A) 05/17/2018 10:39 AM    HDL Cholesterol 47 12/23/2019 12:05 PM    HDL Cholesterol (POC) 48.0 05/17/2018 10:39 AM    LDL Cholesterol (POC) 109.0 05/17/2018 10:39 AM    LDL, calculated 145 (H) 12/23/2019 12:05 PM    VLDL 40 12/23/2019 12:05 PM    Triglyceride 201 (H) 12/23/2019 12:05 PM    Triglycerides (POC) 245.0 (A) 05/17/2018 10:39 AM    CHOL/HDL Ratio 5 (H) 12/23/2019 12:05 PM     Lab Results   Component Value Date/Time    VITAMIN D, 25-HYDROXY 41 12/23/2019 12:04 PM       Lab Results   Component Value Date/Time    TSH, 3rd generation 0.54 12/23/2019 12:04 PM

## 2020-02-20 NOTE — TELEPHONE ENCOUNTER
Requested Prescriptions     Pending Prescriptions Disp Refills    methylphenidate ER 36 mg 24 hr tab 30 Tab 0     Sig: Take 1 Tab by mouth every morning. Max Daily Amount: 36 mg.        Last Refill: 1/23/20  Next Appointment:abhishek hurtado last seen 12/23/19

## 2020-02-21 RX ORDER — METHYLPHENIDATE HYDROCHLORIDE 36 MG/1
36 TABLET ORAL
Qty: 30 TAB | Refills: 0 | Status: SHIPPED | OUTPATIENT
Start: 2020-02-21 | End: 2020-03-23 | Stop reason: SDUPTHER

## 2020-03-23 DIAGNOSIS — F90.9 ATTENTION DEFICIT HYPERACTIVITY DISORDER (ADHD), UNSPECIFIED ADHD TYPE: ICD-10-CM

## 2020-03-23 RX ORDER — METHYLPHENIDATE HYDROCHLORIDE 36 MG/1
36 TABLET ORAL
Qty: 30 TAB | Refills: 0 | Status: SHIPPED | OUTPATIENT
Start: 2020-03-23 | End: 2020-04-21 | Stop reason: SDUPTHER

## 2020-03-23 NOTE — TELEPHONE ENCOUNTER
Requested Prescriptions     Pending Prescriptions Disp Refills    methylphenidate ER 36 mg 24 hr tab 30 Tab 0     Sig: Take 1 Tab by mouth every morning. Max Daily Amount: 36 mg.

## 2020-03-23 NOTE — TELEPHONE ENCOUNTER
PCP: Starlett Leyden, NP    Last appt: 12/23/2019  No future appointments. Requested Prescriptions     Pending Prescriptions Disp Refills    methylphenidate ER 36 mg 24 hr tab 30 Tab 0     Sig: Take 1 Tab by mouth every morning.  Max Daily Amount: 36 mg.       Prior labs and Blood pressures:  BP Readings from Last 3 Encounters:   12/23/19 122/76   09/23/19 132/72   05/15/19 118/80     Lab Results   Component Value Date/Time    Sodium 139 12/23/2019 12:05 PM    Potassium 4.2 12/23/2019 12:05 PM    Chloride 101 12/23/2019 12:05 PM    CO2 23.0 12/23/2019 12:05 PM    Anion gap 15 12/23/2019 12:05 PM    Glucose 98 12/23/2019 12:05 PM    BUN 11.0 12/23/2019 12:05 PM    Creatinine 0.7 12/23/2019 12:05 PM    BUN/Creatinine ratio 16 12/23/2019 12:05 PM    GFR est AA >60 12/23/2019 12:05 PM    GFR est non-AA >60 12/23/2019 12:05 PM    Calcium 10.1 12/23/2019 12:05 PM     Lab Results   Component Value Date/Time    Hemoglobin A1c 4.9 12/23/2019 12:04 PM    Hemoglobin A1c (POC) 5.1 05/17/2018 10:39 AM     Lab Results   Component Value Date/Time    Cholesterol, total 232 (H) 12/23/2019 12:05 PM    Cholesterol (POC) 206.0 (A) 05/17/2018 10:39 AM    HDL Cholesterol 47 12/23/2019 12:05 PM    HDL Cholesterol (POC) 48.0 05/17/2018 10:39 AM    LDL Cholesterol (POC) 109.0 05/17/2018 10:39 AM    LDL, calculated 145 (H) 12/23/2019 12:05 PM    VLDL 40 12/23/2019 12:05 PM    Triglyceride 201 (H) 12/23/2019 12:05 PM    Triglycerides (POC) 245.0 (A) 05/17/2018 10:39 AM    CHOL/HDL Ratio 5 (H) 12/23/2019 12:05 PM     Lab Results   Component Value Date/Time    VITAMIN D, 25-HYDROXY 41 12/23/2019 12:04 PM       Lab Results   Component Value Date/Time    TSH, 3rd generation 0.54 12/23/2019 12:04 PM

## 2020-04-13 RX ORDER — ALBUTEROL SULFATE 90 UG/1
AEROSOL, METERED RESPIRATORY (INHALATION)
Qty: 8.5 INHALER | Refills: 6 | Status: SHIPPED | OUTPATIENT
Start: 2020-04-13 | End: 2021-01-11

## 2020-04-13 NOTE — TELEPHONE ENCOUNTER
PCP: Daniel Hartmann NP    Last appt: 12/23/2019  No future appointments.     Requested Prescriptions     Pending Prescriptions Disp Refills    albuterol (PROVENTIL HFA, VENTOLIN HFA, PROAIR HFA) 90 mcg/actuation inhaler [Pharmacy Med Name: ALBUTEROL HFA (PROAIR) INHALER] 8.5 Inhaler 6     Sig: INHALE 2 PUFFS BY MOUTH EVERY 4 HOURS AS NEEDED FOR WHEEZE       Prior labs and Blood pressures:  BP Readings from Last 3 Encounters:   12/23/19 122/76   09/23/19 132/72   05/15/19 118/80     Lab Results   Component Value Date/Time    Sodium 139 12/23/2019 12:05 PM    Potassium 4.2 12/23/2019 12:05 PM    Chloride 101 12/23/2019 12:05 PM    CO2 23.0 12/23/2019 12:05 PM    Anion gap 15 12/23/2019 12:05 PM    Glucose 98 12/23/2019 12:05 PM    BUN 11.0 12/23/2019 12:05 PM    Creatinine 0.7 12/23/2019 12:05 PM    BUN/Creatinine ratio 16 12/23/2019 12:05 PM    GFR est AA >60 12/23/2019 12:05 PM    GFR est non-AA >60 12/23/2019 12:05 PM    Calcium 10.1 12/23/2019 12:05 PM     Lab Results   Component Value Date/Time    Hemoglobin A1c 4.9 12/23/2019 12:04 PM    Hemoglobin A1c (POC) 5.1 05/17/2018 10:39 AM     Lab Results   Component Value Date/Time    Cholesterol, total 232 (H) 12/23/2019 12:05 PM    Cholesterol (POC) 206.0 (A) 05/17/2018 10:39 AM    HDL Cholesterol 47 12/23/2019 12:05 PM    HDL Cholesterol (POC) 48.0 05/17/2018 10:39 AM    LDL Cholesterol (POC) 109.0 05/17/2018 10:39 AM    LDL, calculated 145 (H) 12/23/2019 12:05 PM    VLDL 40 12/23/2019 12:05 PM    Triglyceride 201 (H) 12/23/2019 12:05 PM    Triglycerides (POC) 245.0 (A) 05/17/2018 10:39 AM    CHOL/HDL Ratio 5 (H) 12/23/2019 12:05 PM     Lab Results   Component Value Date/Time    VITAMIN D, 25-HYDROXY 41 12/23/2019 12:04 PM       Lab Results   Component Value Date/Time    TSH, 3rd generation 0.54 12/23/2019 12:04 PM

## 2020-04-21 DIAGNOSIS — F90.9 ATTENTION DEFICIT HYPERACTIVITY DISORDER (ADHD), UNSPECIFIED ADHD TYPE: ICD-10-CM

## 2020-04-21 RX ORDER — METHYLPHENIDATE HYDROCHLORIDE 36 MG/1
36 TABLET ORAL
Qty: 30 TAB | Refills: 0 | Status: SHIPPED | OUTPATIENT
Start: 2020-04-21 | End: 2020-05-27 | Stop reason: SDUPTHER

## 2020-04-21 NOTE — TELEPHONE ENCOUNTER
PCP: Bryan Garcia NP    Last appt: 12/23/2019  No future appointments. Requested Prescriptions     Pending Prescriptions Disp Refills    methylphenidate ER 36 mg 24 hr tab 30 Tab 0     Sig: Take 1 Tab by mouth every morning.  Max Daily Amount: 36 mg.       Prior labs and Blood pressures:  BP Readings from Last 3 Encounters:   12/23/19 122/76   09/23/19 132/72   05/15/19 118/80     Lab Results   Component Value Date/Time    Sodium 139 12/23/2019 12:05 PM    Potassium 4.2 12/23/2019 12:05 PM    Chloride 101 12/23/2019 12:05 PM    CO2 23.0 12/23/2019 12:05 PM    Anion gap 15 12/23/2019 12:05 PM    Glucose 98 12/23/2019 12:05 PM    BUN 11.0 12/23/2019 12:05 PM    Creatinine 0.7 12/23/2019 12:05 PM    BUN/Creatinine ratio 16 12/23/2019 12:05 PM    GFR est AA >60 12/23/2019 12:05 PM    GFR est non-AA >60 12/23/2019 12:05 PM    Calcium 10.1 12/23/2019 12:05 PM     Lab Results   Component Value Date/Time    Hemoglobin A1c 4.9 12/23/2019 12:04 PM    Hemoglobin A1c (POC) 5.1 05/17/2018 10:39 AM     Lab Results   Component Value Date/Time    Cholesterol, total 232 (H) 12/23/2019 12:05 PM    Cholesterol (POC) 206.0 (A) 05/17/2018 10:39 AM    HDL Cholesterol 47 12/23/2019 12:05 PM    HDL Cholesterol (POC) 48.0 05/17/2018 10:39 AM    LDL Cholesterol (POC) 109.0 05/17/2018 10:39 AM    LDL, calculated 145 (H) 12/23/2019 12:05 PM    VLDL 40 12/23/2019 12:05 PM    Triglyceride 201 (H) 12/23/2019 12:05 PM    Triglycerides (POC) 245.0 (A) 05/17/2018 10:39 AM    CHOL/HDL Ratio 5 (H) 12/23/2019 12:05 PM     Lab Results   Component Value Date/Time    VITAMIN D, 25-HYDROXY 41 12/23/2019 12:04 PM       Lab Results   Component Value Date/Time    TSH, 3rd generation 0.54 12/23/2019 12:04 PM

## 2020-05-27 DIAGNOSIS — F90.9 ATTENTION DEFICIT HYPERACTIVITY DISORDER (ADHD), UNSPECIFIED ADHD TYPE: ICD-10-CM

## 2020-05-27 RX ORDER — METHYLPHENIDATE HYDROCHLORIDE 36 MG/1
36 TABLET ORAL
Qty: 30 TAB | Refills: 0 | Status: SHIPPED | OUTPATIENT
Start: 2020-05-27 | End: 2020-06-25 | Stop reason: SDUPTHER

## 2020-06-25 DIAGNOSIS — F90.9 ATTENTION DEFICIT HYPERACTIVITY DISORDER (ADHD), UNSPECIFIED ADHD TYPE: ICD-10-CM

## 2020-06-25 RX ORDER — LISINOPRIL AND HYDROCHLOROTHIAZIDE 10; 12.5 MG/1; MG/1
TABLET ORAL
Qty: 90 TAB | Refills: 3 | Status: SHIPPED | OUTPATIENT
Start: 2020-06-25 | End: 2021-06-11

## 2020-06-25 RX ORDER — PRIMIDONE 50 MG/1
TABLET ORAL
Qty: 90 TAB | Refills: 3 | Status: SHIPPED | OUTPATIENT
Start: 2020-06-25 | End: 2021-06-11

## 2020-06-25 NOTE — TELEPHONE ENCOUNTER
PCP: Micheal Howard NP    Last appt: 12/23/2019  No future appointments. Requested Prescriptions     Pending Prescriptions Disp Refills    methylphenidate ER 36 mg 24 hr tab 30 Tab 0     Sig: Take 1 Tab by mouth every morning.  Max Daily Amount: 36 mg.       Prior labs and Blood pressures:  BP Readings from Last 3 Encounters:   12/23/19 122/76   09/23/19 132/72   05/15/19 118/80     Lab Results   Component Value Date/Time    Sodium 139 12/23/2019 12:05 PM    Potassium 4.2 12/23/2019 12:05 PM    Chloride 101 12/23/2019 12:05 PM    CO2 23.0 12/23/2019 12:05 PM    Anion gap 15 12/23/2019 12:05 PM    Glucose 98 12/23/2019 12:05 PM    BUN 11.0 12/23/2019 12:05 PM    Creatinine 0.7 12/23/2019 12:05 PM    BUN/Creatinine ratio 16 12/23/2019 12:05 PM    GFR est AA >60 12/23/2019 12:05 PM    GFR est non-AA >60 12/23/2019 12:05 PM    Calcium 10.1 12/23/2019 12:05 PM     Lab Results   Component Value Date/Time    Hemoglobin A1c 4.9 12/23/2019 12:04 PM    Hemoglobin A1c (POC) 5.1 05/17/2018 10:39 AM     Lab Results   Component Value Date/Time    Cholesterol, total 232 (H) 12/23/2019 12:05 PM    Cholesterol (POC) 206.0 (A) 05/17/2018 10:39 AM    HDL Cholesterol 47 12/23/2019 12:05 PM    HDL Cholesterol (POC) 48.0 05/17/2018 10:39 AM    LDL Cholesterol (POC) 109.0 05/17/2018 10:39 AM    LDL, calculated 145 (H) 12/23/2019 12:05 PM    VLDL 40 12/23/2019 12:05 PM    Triglyceride 201 (H) 12/23/2019 12:05 PM    Triglycerides (POC) 245.0 (A) 05/17/2018 10:39 AM    CHOL/HDL Ratio 5 (H) 12/23/2019 12:05 PM     Lab Results   Component Value Date/Time    VITAMIN D, 25-HYDROXY 41 12/23/2019 12:04 PM       Lab Results   Component Value Date/Time    TSH, 3rd generation 0.54 12/23/2019 12:04 PM

## 2020-06-26 RX ORDER — METHYLPHENIDATE HYDROCHLORIDE 36 MG/1
36 TABLET ORAL
Qty: 30 TAB | Refills: 0 | Status: SHIPPED | OUTPATIENT
Start: 2020-06-26 | End: 2020-07-24 | Stop reason: SDUPTHER

## 2020-07-24 DIAGNOSIS — F90.9 ATTENTION DEFICIT HYPERACTIVITY DISORDER (ADHD), UNSPECIFIED ADHD TYPE: ICD-10-CM

## 2020-07-24 NOTE — TELEPHONE ENCOUNTER
PCP: Maximino Lucas NP    Last appt: 12/23/2019  Future Appointments   Date Time Provider Richie David   8/4/2020 10:15 AM Nithya Goodwin NP [facility]       Requested Prescriptions     Pending Prescriptions Disp Refills    methylphenidate ER 36 mg 24 hr tab 30 Tab 0     Sig: Take 1 Tab by mouth every morning.  Max Daily Amount: 36 mg.       Prior labs and Blood pressures:  BP Readings from Last 3 Encounters:   12/23/19 122/76   09/23/19 132/72   05/15/19 118/80     Lab Results   Component Value Date/Time    Sodium 139 12/23/2019 12:05 PM    Potassium 4.2 12/23/2019 12:05 PM    Chloride 101 12/23/2019 12:05 PM    CO2 23.0 12/23/2019 12:05 PM    Anion gap 15 12/23/2019 12:05 PM    Glucose 98 12/23/2019 12:05 PM    BUN 11.0 12/23/2019 12:05 PM    Creatinine 0.7 12/23/2019 12:05 PM    BUN/Creatinine ratio 16 12/23/2019 12:05 PM    GFR est AA >60 12/23/2019 12:05 PM    GFR est non-AA >60 12/23/2019 12:05 PM    Calcium 10.1 12/23/2019 12:05 PM     Lab Results   Component Value Date/Time    Hemoglobin A1c 4.9 12/23/2019 12:04 PM    Hemoglobin A1c (POC) 5.1 05/17/2018 10:39 AM     Lab Results   Component Value Date/Time    Cholesterol, total 232 (H) 12/23/2019 12:05 PM    Cholesterol (POC) 206.0 (A) 05/17/2018 10:39 AM    HDL Cholesterol 47 12/23/2019 12:05 PM    HDL Cholesterol (POC) 48.0 05/17/2018 10:39 AM    LDL Cholesterol (POC) 109.0 05/17/2018 10:39 AM    LDL, calculated 145 (H) 12/23/2019 12:05 PM    VLDL 40 12/23/2019 12:05 PM    Triglyceride 201 (H) 12/23/2019 12:05 PM    Triglycerides (POC) 245.0 (A) 05/17/2018 10:39 AM    CHOL/HDL Ratio 5 (H) 12/23/2019 12:05 PM     Lab Results   Component Value Date/Time    VITAMIN D, 25-HYDROXY 41 12/23/2019 12:04 PM       Lab Results   Component Value Date/Time    TSH, 3rd generation 0.54 12/23/2019 12:04 PM

## 2020-07-24 NOTE — TELEPHONE ENCOUNTER
Last Refill: 6/26/2020  Last Visit: 12/23/2019  Next Visit: 8/4/2020     Requested Prescriptions     Pending Prescriptions Disp Refills    methylphenidate ER 36 mg 24 hr tab 30 Tab 0     Sig: Take 1 Tab by mouth every morning. Max Daily Amount: 36 mg.

## 2020-07-26 RX ORDER — METHYLPHENIDATE HYDROCHLORIDE 36 MG/1
36 TABLET ORAL
Qty: 30 TAB | Refills: 0 | Status: SHIPPED | OUTPATIENT
Start: 2020-07-26 | End: 2020-08-11 | Stop reason: SDUPTHER

## 2020-08-11 ENCOUNTER — OFFICE VISIT (OUTPATIENT)
Dept: INTERNAL MEDICINE CLINIC | Age: 50
End: 2020-08-11
Payer: COMMERCIAL

## 2020-08-11 VITALS
TEMPERATURE: 98.5 F | BODY MASS INDEX: 41.06 KG/M2 | OXYGEN SATURATION: 98 % | HEIGHT: 62 IN | DIASTOLIC BLOOD PRESSURE: 70 MMHG | WEIGHT: 223.1 LBS | RESPIRATION RATE: 18 BRPM | SYSTOLIC BLOOD PRESSURE: 124 MMHG | HEART RATE: 101 BPM

## 2020-08-11 DIAGNOSIS — F90.9 ATTENTION DEFICIT HYPERACTIVITY DISORDER (ADHD), UNSPECIFIED ADHD TYPE: ICD-10-CM

## 2020-08-11 PROCEDURE — 99213 OFFICE O/P EST LOW 20 MIN: CPT | Performed by: NURSE PRACTITIONER

## 2020-08-11 RX ORDER — METHYLPHENIDATE HYDROCHLORIDE 36 MG/1
36 TABLET ORAL
Qty: 30 TAB | Refills: 0 | Status: SHIPPED | OUTPATIENT
Start: 2020-08-11 | End: 2020-09-23 | Stop reason: SDUPTHER

## 2020-08-11 NOTE — PROGRESS NOTES
Alma Patel is a 48 y.o. female     Chief Complaint   Patient presents with    Behavioral Problem     Adhd follow up       Visit Vitals  /70 (BP 1 Location: Left arm, BP Patient Position: Sitting)   Pulse (!) 101   Temp 98.5 °F (36.9 °C) (Oral)   Resp 18   Ht 5' 2\" (1.575 m)   Wt 223 lb 1.6 oz (101.2 kg)   SpO2 98%   BMI 40.81 kg/m²       Health Maintenance Due   Topic Date Due    Pneumococcal 0-64 years (1 of 1 - PPSV23) 05/22/1976    Shingrix Vaccine Age 50> (1 of 2) 05/22/2020    Breast Cancer Screen Mammogram  05/22/2020    FOBT Q1Y Age 50-75  05/22/2020    Influenza Age 5 to Adult  08/01/2020       1. Have you been to the ER, urgent care clinic since your last visit? Hospitalized since your last visit? No    2. Have you seen or consulted any other health care providers outside of the [address] [place] [address] since your last visit? Include any pap smears or colon screening.  No

## 2020-08-11 NOTE — PROGRESS NOTES
Subjective:  Ms. Yo Brewer is a pleasant 26-year-old lady, who comes in today after a several months absence. She is here for follow up of ADHD, for which she has been managed on Methylphenidate ER 36 mg daily. She denies any side effects from the medication. This has helped her to remain focused at work. Today she specifically denies any headaches, dizziness or blurred vision. She denies any chest pain or palpitations. She denies any shortness of breath, cough, wheezing, PND or orthopnea. She denies any ankle edema. Past Medical History:   Diagnosis Date    Hypertension     Ill-defined condition     PCOS     Past Surgical History:   Procedure Laterality Date    HX GYN      cyst removal    HX ORTHOPAEDIC      L Knee    HX ROTATOR CUFF REPAIR      right    HX TONSILLECTOMY         Current Outpatient Medications on File Prior to Visit   Medication Sig Dispense Refill    lisinopril-hydroCHLOROthiazide (PRINZIDE, ZESTORETIC) 10-12.5 mg per tablet TAKE 1 TABLET BY MOUTH EVERY DAY 90 Tab 3    primidone (MYSOLINE) 50 mg tablet TAKE 1 TABLET BY MOUTH EVERY DAY 90 Tab 3    clonazePAM (KlonoPIN) 0.5 mg tablet TAKE 1 TABLET BY MOUTH EVERY DAY AT BEDTIME AS NEEDED FOR ANXIETY 90 Tab 1    albuterol (PROVENTIL HFA, VENTOLIN HFA, PROAIR HFA) 90 mcg/actuation inhaler INHALE 2 PUFFS BY MOUTH EVERY 4 HOURS AS NEEDED FOR WHEEZE 8.5 Inhaler 6    azelastine-fluticasone 137-50 mcg/spray spry 1 [place] by Nasal route daily. 1 g 6    cholecalciferol (VITAMIN D3) 2,000 unit cap capsule Take  by mouth two (2) times a day.  olopatadine (PATANOL) 0.1 % ophthalmic solution Administer 2 Drops to both eyes two (2) times a day. 15 mL 6    cetirizine (ZYRTEC) 10 mg tablet Take  by mouth.  multivitamin (ONE A DAY) tablet Take 1 Tab by mouth daily. Indications: Flinstone chewable      omeprazole (PRILOSEC) 40 mg capsule Take 40 mg by mouth daily.       [DISCONTINUED] methylphenidate ER 36 mg 24 hr tab Take 1 Tab by mouth every morning. Max Daily Amount: 36 mg. 30 Tab 0    methylPREDNISolone (MEDROL DOSEPACK) 4 mg tablet Take 1 Tab by mouth Specific Days and Specific Times. 1 Dose Pack 0    methocarbamol (ROBAXIN) 500 mg tablet       meloxicam (MOBIC) 15 mg tablet Take 1 Tab by mouth daily. 30 Tab 0    doxycycline (VIBRAMYCIN) 100 mg capsule        No current facility-administered medications on file prior to visit. Allergies   Allergen Reactions    Pcn [Penicillins] Anaphylaxis    Zithromax [Azithromycin] Anaphylaxis   Physical Examination:  GENERAL:  Pleasant, obese lady in no acute distress. She is alert and oriented. She answers my questions appropriately. VITALS:  Blood pressure:  124/70. Pulse:  101. Respirations:  18. Temperature:  98.5. O2 sat:  98. HEENT:  Normocephalic, atraumatic. NECK:  Supple without adenopathy. CHEST:  Lungs clear to auscultation, no rales or wheezes. CV:  Heart regular rhythm without murmur or gallop. EXTREMITIES:  No edema or calf tenderness. Distal pulses were present. Impression:  1. ADHD. 2. Obesity with BMI of 40.81. Plan:  1. She will follow up in December for her updated history and physical.  2. Once again we discussed the importance of weight loss, prudent diet and exercise to bring the BMI within acceptable level.

## 2020-08-11 NOTE — PATIENT INSTRUCTIONS
Attention Deficit Hyperactivity Disorder (ADHD) in Adults: Care Instructions Your Care Instructions Attention deficit hyperactivity disorder, or ADHD, is a condition that makes it hard to pay attention. So you may have problems when you try to focus, get organized, and finish tasks. It might make you more active than other people. Or you might do things without thinking first. 
ADHD is very common. It usually starts in early childhood. Many adults don't realize they have it until their children are diagnosed. Then they become aware of their own symptoms. Doctors don't know what causes ADHD. But it often runs in families. ADHD can be treated with medicines, behavior training, and counseling. Treatment can improve your life. Follow-up care is a key part of your treatment and safety. Be sure to make and go to all appointments, and call your doctor if you are having problems. It's also a good idea to know your test results and keep a list of the medicines you take. How can you care for yourself at home? · Learn all you can about ADHD. This will help you and your family understand it better. · Take your medicines exactly as prescribed. Call your doctor if you think you are having a problem with your medicine. You will get more details on the specific medicines your doctor prescribes. · If you miss a dose of your medicine, do not take an extra dose. · If your doctor suggests counseling, find a counselor you like and trust. Talk openly and honestly. Be willing to make some changes. · Find a support group for adults with ADHD. Talking to others with the same problems can help you feel better. It can also give you ideas about how to best cope with the condition. · Get rid of distractions at your work space. Keep your desk clean. Try not to face a window or busy hallway. · Use files, planners, and other tools to keep you organized. · Limit use of alcohol, and do not use illegal drugs.  People with ADHD tend to develop substance use disorder more easily than others. Tell your doctor if you need help to quit. Counseling, support groups, and sometimes medicines can help you stay free of alcohol or drugs. · Get at least 30 minutes of physical activity on most days of the week. Exercise has been shown to help people cope with ADHD. Walking is a good choice. You also may want to do other activities, such as running, swimming, cycling, or playing tennis or team sports. When should you call for help? Watch closely for changes in your health, and be sure to contact your doctor if: 
· You feel sad a lot or cry all the time. · You have trouble sleeping, or you sleep too much. · You find it hard to concentrate, make decisions, or remember things. · You change how you normally eat. · You feel guilty for no reason. Where can you learn more? Go to http://www.flores.com/ Enter B196 in the search box to learn more about \"Attention Deficit Hyperactivity Disorder (ADHD) in Adults: Care Instructions. \" Current as of: January 31, 2020               Content Version: 12.5 © 7811-4902 [employer]. Care instructions adapted under license by [employer] (which disclaims liability or warranty for this information). If you have questions about a medical condition or this instruction, always ask your healthcare professional. [address] any warranty or liability for your use of this information.

## 2020-09-23 DIAGNOSIS — F90.9 ATTENTION DEFICIT HYPERACTIVITY DISORDER (ADHD), UNSPECIFIED ADHD TYPE: ICD-10-CM

## 2020-09-23 RX ORDER — METHYLPHENIDATE HYDROCHLORIDE 36 MG/1
36 TABLET ORAL
Qty: 30 TAB | Refills: 0 | Status: SHIPPED | OUTPATIENT
Start: 2020-09-23 | End: 2020-10-26 | Stop reason: SDUPTHER

## 2020-09-23 NOTE — TELEPHONE ENCOUNTER
PCP: Nikhil Mcdonald NP    Last appt: 8/11/2020    Future Appointments   Date Time Provider Richie David   1/19/2021  8:30 AM Verena Goodwin NP [facility]       Requested Prescriptions     Pending Prescriptions Disp Refills    methylphenidate ER 36 mg 24 hr tab 30 Tab 0     Sig: Take 1 Tab by mouth every morning.  Max Daily Amount: 36 mg.       Prior labs and Blood pressures:  BP Readings from Last 3 Encounters:   08/11/20 124/70   12/23/19 122/76   09/23/19 132/72     Lab Results   Component Value Date/Time    Sodium 139 12/23/2019 12:05 PM    Potassium 4.2 12/23/2019 12:05 PM    Chloride 101 12/23/2019 12:05 PM    CO2 23.0 12/23/2019 12:05 PM    Anion gap 15 12/23/2019 12:05 PM    Glucose 98 12/23/2019 12:05 PM    BUN 11.0 12/23/2019 12:05 PM    Creatinine 0.7 12/23/2019 12:05 PM    BUN/Creatinine ratio 16 12/23/2019 12:05 PM    GFR est AA >60 12/23/2019 12:05 PM    GFR est non-AA >60 12/23/2019 12:05 PM    Calcium 10.1 12/23/2019 12:05 PM     Lab Results   Component Value Date/Time    Hemoglobin A1c 4.9 12/23/2019 12:04 PM    Hemoglobin A1c (POC) 5.1 05/17/2018 10:39 AM     Lab Results   Component Value Date/Time    Cholesterol, total 232 (H) 12/23/2019 12:05 PM    Cholesterol (POC) 206.0 (A) 05/17/2018 10:39 AM    HDL Cholesterol 47 12/23/2019 12:05 PM    HDL Cholesterol (POC) 48.0 05/17/2018 10:39 AM    LDL Cholesterol (POC) 109.0 05/17/2018 10:39 AM    LDL, calculated 145 (H) 12/23/2019 12:05 PM    VLDL 40 12/23/2019 12:05 PM    Triglyceride 201 (H) 12/23/2019 12:05 PM    Triglycerides (POC) 245.0 (A) 05/17/2018 10:39 AM    CHOL/HDL Ratio 5 (H) 12/23/2019 12:05 PM     Lab Results   Component Value Date/Time    VITAMIN D, 25-HYDROXY 41 12/23/2019 12:04 PM       Lab Results   Component Value Date/Time    TSH, 3rd generation 0.54 12/23/2019 12:04 PM

## 2020-10-26 DIAGNOSIS — F90.9 ATTENTION DEFICIT HYPERACTIVITY DISORDER (ADHD), UNSPECIFIED ADHD TYPE: ICD-10-CM

## 2020-10-26 NOTE — TELEPHONE ENCOUNTER
Requested Prescriptions     Pending Prescriptions Disp Refills    methylphenidate ER 36 mg 24 hr tab 30 Tab 0     Sig: Take 1 Tab by mouth every morning. Max Daily Amount: 36 mg.        Last Refill: 9/23/20  Next Appointment:1/19/21

## 2020-10-27 RX ORDER — METHYLPHENIDATE HYDROCHLORIDE 36 MG/1
36 TABLET ORAL
Qty: 30 TAB | Refills: 0 | Status: SHIPPED | OUTPATIENT
Start: 2020-10-27 | End: 2020-11-25 | Stop reason: SDUPTHER

## 2020-11-07 DIAGNOSIS — G47.00 INSOMNIA, UNSPECIFIED TYPE: ICD-10-CM

## 2020-11-09 RX ORDER — CLONAZEPAM 0.5 MG/1
TABLET ORAL
Qty: 90 TAB | Refills: 1 | Status: SHIPPED | OUTPATIENT
Start: 2020-11-09 | End: 2021-05-06

## 2020-11-25 DIAGNOSIS — F90.9 ATTENTION DEFICIT HYPERACTIVITY DISORDER (ADHD), UNSPECIFIED ADHD TYPE: ICD-10-CM

## 2020-11-25 RX ORDER — METHYLPHENIDATE HYDROCHLORIDE 36 MG/1
36 TABLET ORAL
Qty: 30 TAB | Refills: 0 | Status: SHIPPED | OUTPATIENT
Start: 2020-11-25 | End: 2020-12-28 | Stop reason: SDUPTHER

## 2020-11-25 NOTE — TELEPHONE ENCOUNTER
PCP: Fidelia Hoffmann NP    Last appt: 8/11/2020  Future Appointments   Date Time Provider Richie David   1/19/2021  8:30 AM Blair Goodwin NP [facility] AMB       Requested Prescriptions     Pending Prescriptions Disp Refills    methylphenidate ER 36 mg 24 hr tab 30 Tab 0     Sig: Take 1 Tab by mouth every morning.  Max Daily Amount: 36 mg.       Prior labs and Blood pressures:  BP Readings from Last 3 Encounters:   08/11/20 124/70   12/23/19 122/76   09/23/19 132/72     Lab Results   Component Value Date/Time    Sodium 139 12/23/2019 12:05 PM    Potassium 4.2 12/23/2019 12:05 PM    Chloride 101 12/23/2019 12:05 PM    CO2 23.0 12/23/2019 12:05 PM    Anion gap 15 12/23/2019 12:05 PM    Glucose 98 12/23/2019 12:05 PM    BUN 11.0 12/23/2019 12:05 PM    Creatinine 0.7 12/23/2019 12:05 PM    BUN/Creatinine ratio 16 12/23/2019 12:05 PM    GFR est AA >60 12/23/2019 12:05 PM    GFR est non-AA >60 12/23/2019 12:05 PM    Calcium 10.1 12/23/2019 12:05 PM     Lab Results   Component Value Date/Time    Hemoglobin A1c 4.9 12/23/2019 12:04 PM    Hemoglobin A1c (POC) 5.1 05/17/2018 10:39 AM     Lab Results   Component Value Date/Time    Cholesterol, total 232 (H) 12/23/2019 12:05 PM    Cholesterol (POC) 206.0 (A) 05/17/2018 10:39 AM    HDL Cholesterol 47 12/23/2019 12:05 PM    HDL Cholesterol (POC) 48.0 05/17/2018 10:39 AM    LDL Cholesterol (POC) 109.0 05/17/2018 10:39 AM    LDL, calculated 145 (H) 12/23/2019 12:05 PM    VLDL 40 12/23/2019 12:05 PM    Triglyceride 201 (H) 12/23/2019 12:05 PM    Triglycerides (POC) 245.0 (A) 05/17/2018 10:39 AM    CHOL/HDL Ratio 5 (H) 12/23/2019 12:05 PM     Lab Results   Component Value Date/Time    VITAMIN D, 25-HYDROXY 41 12/23/2019 12:04 PM       Lab Results   Component Value Date/Time    TSH, 3rd generation 0.54 12/23/2019 12:04 PM

## 2020-12-28 DIAGNOSIS — F90.9 ATTENTION DEFICIT HYPERACTIVITY DISORDER (ADHD), UNSPECIFIED ADHD TYPE: ICD-10-CM

## 2020-12-28 NOTE — TELEPHONE ENCOUNTER
Last Refill: 11/25/2020  Last Visit: 8/11/2020   Next Visit: 1/19/2021    Requested Prescriptions     Pending Prescriptions Disp Refills    methylphenidate ER 36 mg 24 hr tab 30 Tab 0     Sig: Take 1 Tab by mouth every morning. Max Daily Amount: 36 mg.

## 2020-12-29 RX ORDER — METHYLPHENIDATE HYDROCHLORIDE 36 MG/1
36 TABLET ORAL
Qty: 30 TAB | Refills: 0 | Status: SHIPPED | OUTPATIENT
Start: 2020-12-29 | End: 2021-01-26 | Stop reason: SDUPTHER

## 2021-01-11 RX ORDER — ALBUTEROL SULFATE 90 UG/1
AEROSOL, METERED RESPIRATORY (INHALATION)
Qty: 18 INHALER | Refills: 6 | Status: SHIPPED | OUTPATIENT
Start: 2021-01-11 | End: 2022-10-11 | Stop reason: SDUPTHER

## 2021-01-19 ENCOUNTER — OFFICE VISIT (OUTPATIENT)
Dept: INTERNAL MEDICINE CLINIC | Age: 51
End: 2021-01-19
Payer: COMMERCIAL

## 2021-01-19 VITALS
DIASTOLIC BLOOD PRESSURE: 72 MMHG | HEART RATE: 88 BPM | BODY MASS INDEX: 43.1 KG/M2 | SYSTOLIC BLOOD PRESSURE: 124 MMHG | WEIGHT: 234.2 LBS | HEIGHT: 62 IN | OXYGEN SATURATION: 95 % | TEMPERATURE: 97.7 F | RESPIRATION RATE: 18 BRPM

## 2021-01-19 DIAGNOSIS — R53.83 FATIGUE, UNSPECIFIED TYPE: ICD-10-CM

## 2021-01-19 DIAGNOSIS — E78.2 MIXED HYPERLIPIDEMIA: ICD-10-CM

## 2021-01-19 DIAGNOSIS — F90.9 ATTENTION DEFICIT HYPERACTIVITY DISORDER (ADHD), UNSPECIFIED ADHD TYPE: Primary | ICD-10-CM

## 2021-01-19 DIAGNOSIS — R73.9 HYPERGLYCEMIA: ICD-10-CM

## 2021-01-19 DIAGNOSIS — Z00.00 PHYSICAL EXAM: ICD-10-CM

## 2021-01-19 DIAGNOSIS — N39.0 URINARY TRACT INFECTION WITHOUT HEMATURIA, SITE UNSPECIFIED: ICD-10-CM

## 2021-01-19 DIAGNOSIS — E55.9 VITAMIN D DEFICIENCY: ICD-10-CM

## 2021-01-19 DIAGNOSIS — G47.00 INSOMNIA, UNSPECIFIED TYPE: ICD-10-CM

## 2021-01-19 DIAGNOSIS — F41.9 ANXIETY: ICD-10-CM

## 2021-01-19 DIAGNOSIS — E66.01 CLASS 3 SEVERE OBESITY DUE TO EXCESS CALORIES WITHOUT SERIOUS COMORBIDITY WITH BODY MASS INDEX (BMI) OF 40.0 TO 44.9 IN ADULT (HCC): ICD-10-CM

## 2021-01-19 DIAGNOSIS — Z12.11 SPECIAL SCREENING FOR MALIGNANT NEOPLASMS, COLON: ICD-10-CM

## 2021-01-19 LAB
25(OH)D3 SERPL-MCNC: 40 NG/ML (ref 30–96)
A-G RATIO,AGRAT: 1.8 RATIO
ALBUMIN SERPL-MCNC: 4.8 G/DL (ref 3.9–5.4)
ALP SERPL-CCNC: 112 U/L (ref 38–126)
ALT SERPL-CCNC: 15 U/L (ref 0–35)
ANION GAP SERPL CALC-SCNC: 16 MMOL/L
AST SERPL W P-5'-P-CCNC: 23 U/L (ref 14–36)
BACTERIA,BACTU: ABNORMAL
BILIRUB SERPL-MCNC: 0.5 MG/DL (ref 0.2–1.3)
BILIRUB UR QL: NEGATIVE
BUN SERPL-MCNC: 10 MG/DL (ref 7–17)
BUN/CREATININE RATIO,BUCR: 14 RATIO
CALCIUM SERPL-MCNC: 9.8 MG/DL (ref 8.4–10.2)
CHLORIDE SERPL-SCNC: 99 MMOL/L (ref 98–107)
CHOL/HDL RATIO,CHHD: 5 RATIO (ref 0–4)
CHOLEST SERPL-MCNC: 262 MG/DL (ref 0–200)
CLARITY: CLEAR
CO2 SERPL-SCNC: 27 MMOL/L (ref 22–32)
COLOR UR: ABNORMAL
CREAT SERPL-MCNC: 0.7 MG/DL (ref 0.7–1.2)
ERYTHROCYTE [DISTWIDTH] IN BLOOD BY AUTOMATED COUNT: 14.4 %
GLOBULIN,GLOB: 2.7
GLUCOSE 24H UR-MRATE: NEGATIVE G/(24.H)
GLUCOSE SERPL-MCNC: 96 MG/DL (ref 65–105)
HBA1C MFR BLD HPLC: 5.2 % (ref 4–5.7)
HCT VFR BLD AUTO: 52.6 % (ref 37–51)
HDLC SERPL-MCNC: 58 MG/DL (ref 35–130)
HGB BLD-MCNC: 17 G/DL (ref 12–18)
HGB UR QL STRIP: ABNORMAL
KETONES UR QL STRIP.AUTO: NEGATIVE
LDL/HDL RATIO,LDHD: 3 RATIO
LDLC SERPL CALC-MCNC: 156 MG/DL (ref 0–130)
LEUKOCYTE ESTERASE: NEGATIVE
LYMPHOCYTES ABSOLUTE: 2.4 K/UL (ref 0.6–4.1)
LYMPHOCYTES NFR BLD: 19 % (ref 10–58.5)
MCH RBC QN AUTO: 29.8 PG (ref 26–32)
MCHC RBC AUTO-ENTMCNC: 32.3 G/DL (ref 30–36)
MCV RBC AUTO: 92.3 FL (ref 80–97)
MONOCYTES ABS-DIF,2141: 0.8 K/UL (ref 0–1.8)
MONOCYTES NFR BLD: 5.9 % (ref 0.1–24)
NEUTROPHILS # BLD: 75.1 % (ref 37–92)
NEUTROPHILS ABS,2156: 9.6 K/UL (ref 2–7.8)
NITRITE UR QL STRIP.AUTO: NEGATIVE
PH UR STRIP: 6.5 [PH] (ref 5–7)
PLATELET # BLD AUTO: 253 K/UL (ref 140–440)
POTASSIUM SERPL-SCNC: 3.6 MMOL/L (ref 3.6–5)
PROT SERPL-MCNC: 7.5 G/DL (ref 6.3–8.2)
PROT UR STRIP-MCNC: NEGATIVE MG/DL
RBC # BLD AUTO: 5.7 M/UL (ref 4.2–6.3)
RBC #/AREA URNS HPF: ABNORMAL #/HPF
SODIUM SERPL-SCNC: 142 MMOL/L (ref 137–145)
SP GR UR REFRACTOMETRY: 1.01 (ref 1–1.03)
SQUAMOUS EPITHELIAL CELLS: ABNORMAL
T4 FREE SERPL-MCNC: 1.04 NG/DL (ref 0.58–2.3)
TRIGL SERPL-MCNC: 242 MG/DL (ref 0–200)
TSH SERPL DL<=0.05 MIU/L-ACNC: 0.83 UIU/ML (ref 0.34–5.6)
UROBILINOGEN UR QL STRIP.AUTO: NEGATIVE
VLDLC SERPL CALC-MCNC: 48 MG/DL
WBC # BLD AUTO: 12.8 K/UL (ref 4.1–10.9)
WBC URNS QL MICRO: ABNORMAL #/HPF

## 2021-01-19 PROCEDURE — 84439 ASSAY OF FREE THYROXINE: CPT | Performed by: NURSE PRACTITIONER

## 2021-01-19 PROCEDURE — 81003 URINALYSIS AUTO W/O SCOPE: CPT | Performed by: NURSE PRACTITIONER

## 2021-01-19 PROCEDURE — 99396 PREV VISIT EST AGE 40-64: CPT | Performed by: NURSE PRACTITIONER

## 2021-01-19 PROCEDURE — 83036 HEMOGLOBIN GLYCOSYLATED A1C: CPT | Performed by: NURSE PRACTITIONER

## 2021-01-19 PROCEDURE — 80061 LIPID PANEL: CPT | Performed by: NURSE PRACTITIONER

## 2021-01-19 PROCEDURE — 80050 GENERAL HEALTH PANEL: CPT | Performed by: NURSE PRACTITIONER

## 2021-01-19 PROCEDURE — 82306 VITAMIN D 25 HYDROXY: CPT | Performed by: NURSE PRACTITIONER

## 2021-01-19 NOTE — PROGRESS NOTES
Subjective:  Ms. Clay Burgess is a pleasant 60-year-old lady, who comes in today for updated history and physical.  She has no complaints. Past Medical History:   Diagnosis Date    Hypertension     Ill-defined condition     PCOS     Past Surgical History:   Procedure Laterality Date    HX GYN      cyst removal    HX ORTHOPAEDIC      L Knee    HX ROTATOR CUFF REPAIR      right    HX TONSILLECTOMY         Current Outpatient Medications on File Prior to Visit   Medication Sig Dispense Refill    albuterol (PROVENTIL HFA, VENTOLIN HFA, PROAIR HFA) 90 mcg/actuation inhaler INHALE 2 PUFFS BY MOUTH EVERY 4 HOURS AS NEEDED FOR WHEEZING 18 Inhaler 6    methylphenidate ER 36 mg 24 hr tab Take 1 Tab by mouth every morning. Max Daily Amount: 36 mg. 30 Tab 0    azelastine-fluticasone 137-50 mcg/spray spry PLACE 1 SPRAY INTO EACH NOSTRIL EVERY DAY 23 g 6    clonazePAM (KlonoPIN) 0.5 mg tablet TAKE 1 TABLET BY MOUTH EVERY DAY AT BEDTIME AS NEEDED FOR ANXIETY 90 Tab 1    lisinopril-hydroCHLOROthiazide (PRINZIDE, ZESTORETIC) 10-12.5 mg per tablet TAKE 1 TABLET BY MOUTH EVERY DAY 90 Tab 3    primidone (MYSOLINE) 50 mg tablet TAKE 1 TABLET BY MOUTH EVERY DAY 90 Tab 3    cholecalciferol (VITAMIN D3) 2,000 unit cap capsule Take  by mouth two (2) times a day.  olopatadine (PATANOL) 0.1 % ophthalmic solution Administer 2 Drops to both eyes two (2) times a day. 15 mL 6    cetirizine (ZYRTEC) 10 mg tablet Take  by mouth.  multivitamin (ONE A DAY) tablet Take 1 Tab by mouth daily. Indications: Flinstone chewable      omeprazole (PRILOSEC) 40 mg capsule Take 40 mg by mouth daily.  methylPREDNISolone (MEDROL DOSEPACK) 4 mg tablet Take 1 Tab by mouth Specific Days and Specific Times. 1 Dose Pack 0    methocarbamol (ROBAXIN) 500 mg tablet       meloxicam (MOBIC) 15 mg tablet Take 1 Tab by mouth daily.  30 Tab 0    doxycycline (VIBRAMYCIN) 100 mg capsule        No current facility-administered medications on file prior to visit. Allergies   Allergen Reactions    Pcn [Penicillins] Anaphylaxis    Zithromax [Azithromycin] Anaphylaxis     Past Medical History/Surgeries:    1. Tonsillectomy. 2. Left knee arthroscopy. 3. Right rotator cuff repair. 4. Multiple myringotomies. 5. Excision of left ovarian cyst.  6. Excision of sty, right eye. Illnesses:  1. Hypertension. 2. Hyperlipidemia. 3. ADHD. 4. Nonessential tremor. 5. PTSD. 6. PCOD. 7. Chronic anxiety. 8. Allergic rhinitis. Family History:  Father  at 29 of a massive heart attack. Mother  at 29 of ruptured cerebral aneurysm. Four sisters living, one sister has had breast cancer, but she is doing well. Social History:  She is . She lives alone. She works in the [employer]. She has no children. Allergies:  Penicillin and Zithromax. Medications:  1. Albuterol inhaler, two puffs every four hours as needed for wheezing. 2. Methylphenidate ER 36 mg, 24 hour tab every morning. 3. Azelastine Fluticasone, 135-50 mcg/spray, one spray each nostril every day. 4. Clonazepam 0.5 mg at bedtime as needed. 5. Lisinopril/Hydrochlorothiazide 10/12.5 mg daily. 6. Primidone 50 mg daily. 7. Vitamin D3 2,000 units daily. 8. Zyrtec 10 mg daily as needed. 9. Omeprazole 40 mg daily as needed. Habits:  She continues to smoke one pack of cigarettes per day. She does not drink. Review of Systems:  HEENT:  Denies any headaches, dizziness or blurred vision. She is in need of getting an eye exam.  CVR:  Denies any chest pain or palpitations. Denies any syncopal episode. She denies any shortness of breath, but does note an occasional cough that she attributes to smoke. She denies any wheezing, hemoptysis, PND or orthopnea. Denies any ankle edema. GI:  Appetite is good, weight has gone up in the last year. She readily admits she has not been faithful at watching her diet.   Does have a normal bowel pattern without presence of blood in stools or melena. She is in need of getting a colonoscopy. :  Denies any urinary symptoms. GYN:  She is up to date with her mammogram and pelvic and pap, which she is getting done through the [facility]. Immunizations:  She denied flu shot and is in need of getting her Shingrix vaccination. Physical Examination:  GENERAL:  Pleasant lady in no acute distress. She is alert and oriented. She answers my questions appropriately. VITALS:  Blood Pressure:  124/72. Pulse:  88. Temperature: 97.7. O2 sat:  95.  Weight:  Up by 11 pounds since August, 2020. HEENT:  Normocephalic, atraumatic. PERRLA, EOMI. TMs normal.  Mouth mucosa pink. Tongue midline. Pharynx normal.  NECK:  Supple without adenopathy, thyromegaly or carotid bruits. CHEST:  Lungs clear to auscultation, no rales or wheezes. CV:  Heart regular rhythm without murmur or gallop. ABDOMEN:  Soft, non tender, no organomegaly or masses. No CVA tenderness. No lymphadenopathy. EXTREMITIES:  No edema or calf tenderness. Distal pulses were present. NEUROLOGIC:  Cranial nerves II-XII intact. Excellent strength in the upper and lower extremities against resistance. Sensation is preserved. Romberg is negative. Reflexes 1+ and symmetrical.    Impression:  1. Hypertension. 2. Hyperlipidemia. 3. ADHD. 4. Chronic depression/anxiety. 5. Nonessential tremors. 6. PCOD. 7. Obesity with BMI of 42.84. Plan:  1. She was fasting this morning, so therefore it was opted to do all of her lab studies. I will call her as soon as I have her results. Otherwise she will continue with her current regimen and I will continue to see her every six months. 2. Once again we discussed the importance of a prudent diet, weight loss and exercise to keep her BMI within acceptable level. 3. Once again we discussed the importance of discontinuing smoking. 4. I did discuss with her the importance of her Shingrix vaccination.   5. She is referred for a colonoscopy.   6. We will obtain results of her pelvic and pap and mammogram.

## 2021-01-19 NOTE — PATIENT INSTRUCTIONS

## 2021-01-19 NOTE — PROGRESS NOTES
Marilin Matute is a 48 y.o. female     Chief Complaint   Patient presents with    Complete Physical       Visit Vitals  /72 (BP 1 Location: Left arm, BP Patient Position: Sitting)   Pulse 88   Temp 97.7 °F (36.5 °C) (Temporal)   Resp 18   Ht 5' 2\" (1.575 m)   Wt 234 lb 3.2 oz (106.2 kg)   LMP 12/26/2020   SpO2 95%   BMI 42.84 kg/m²       Health Maintenance Due   Topic Date Due    Pneumococcal 0-64 years (1 of 1 - PPSV23) 05/22/1976    Shingrix Vaccine Age 50> (1 of 2) 05/22/2020    Colorectal Cancer Screening Combo  05/22/2020    Breast Cancer Screen Mammogram  05/22/2020    Flu Vaccine (1) 09/01/2020       1. Have you been to the ER, urgent care clinic since your last visit? Hospitalized since your last visit? No    2. Have you seen or consulted any other health care providers outside of the [address] [place] [address] since your last visit? Include any pap smears or colon screening.  No

## 2021-01-21 LAB — BACTERIA UR CULT: NO GROWTH

## 2021-01-26 DIAGNOSIS — F90.9 ATTENTION DEFICIT HYPERACTIVITY DISORDER (ADHD), UNSPECIFIED ADHD TYPE: ICD-10-CM

## 2021-01-26 RX ORDER — METHYLPHENIDATE HYDROCHLORIDE 36 MG/1
36 TABLET ORAL
Qty: 30 TAB | Refills: 0 | Status: SHIPPED | OUTPATIENT
Start: 2021-01-26 | End: 2021-02-22 | Stop reason: SDUPTHER

## 2021-01-26 RX ORDER — ATORVASTATIN CALCIUM 20 MG/1
20 TABLET, FILM COATED ORAL DAILY
Qty: 60 TAB | Refills: 1 | Status: SHIPPED | OUTPATIENT
Start: 2021-01-26 | End: 2021-03-16 | Stop reason: SDUPTHER

## 2021-01-26 NOTE — PROGRESS NOTES
Lab results discussed with patient. Start Lipitor 20 mg daily. Follow up 6 weeks. Must watch diet carefully.

## 2021-02-22 DIAGNOSIS — F90.9 ATTENTION DEFICIT HYPERACTIVITY DISORDER (ADHD), UNSPECIFIED ADHD TYPE: ICD-10-CM

## 2021-02-22 RX ORDER — METHYLPHENIDATE HYDROCHLORIDE 36 MG/1
36 TABLET ORAL
Qty: 30 TAB | Refills: 0 | Status: SHIPPED | OUTPATIENT
Start: 2021-02-22 | End: 2021-03-23 | Stop reason: SDUPTHER

## 2021-02-22 NOTE — TELEPHONE ENCOUNTER
Last Refill: 1/26/2021  Last Visit: 1/19/2021   Next Visit: 3/9/2021    Requested Prescriptions     Pending Prescriptions Disp Refills    methylphenidate ER 36 mg 24 hr tab 30 Tab 0     Sig: Take 1 Tab by mouth every morning. Max Daily Amount: 36 mg.

## 2021-03-09 ENCOUNTER — OFFICE VISIT (OUTPATIENT)
Dept: INTERNAL MEDICINE CLINIC | Age: 51
End: 2021-03-09
Payer: COMMERCIAL

## 2021-03-09 VITALS
HEART RATE: 79 BPM | HEIGHT: 62 IN | RESPIRATION RATE: 18 BRPM | TEMPERATURE: 97.4 F | OXYGEN SATURATION: 99 % | BODY MASS INDEX: 43.96 KG/M2 | WEIGHT: 238.9 LBS | SYSTOLIC BLOOD PRESSURE: 118 MMHG | DIASTOLIC BLOOD PRESSURE: 76 MMHG

## 2021-03-09 DIAGNOSIS — E78.2 MIXED HYPERLIPIDEMIA: Primary | ICD-10-CM

## 2021-03-09 DIAGNOSIS — Z11.59 NEED FOR HEPATITIS C SCREENING TEST: ICD-10-CM

## 2021-03-09 PROCEDURE — 80053 COMPREHEN METABOLIC PANEL: CPT | Performed by: NURSE PRACTITIONER

## 2021-03-09 PROCEDURE — 99213 OFFICE O/P EST LOW 20 MIN: CPT | Performed by: NURSE PRACTITIONER

## 2021-03-09 PROCEDURE — 80061 LIPID PANEL: CPT | Performed by: NURSE PRACTITIONER

## 2021-03-09 RX ORDER — PREDNISONE 10 MG/1
TABLET ORAL
Qty: 21 TAB | Refills: 0 | Status: SHIPPED | OUTPATIENT
Start: 2021-03-09 | End: 2022-01-28 | Stop reason: ALTCHOICE

## 2021-03-09 NOTE — PROGRESS NOTES
Sheldon Malloy is a 48 y.o. female     Chief Complaint   Patient presents with    Cholesterol Problem     6 wk follow up       Visit Vitals  /76 (BP 1 Location: Left upper arm, BP Patient Position: Sitting, BP Cuff Size: Large adult)   Pulse 79   Temp 97.4 °F (36.3 °C) (Temporal)   Resp 18   Ht 5' 2\" (1.575 m)   Wt 238 lb 14.4 oz (108.4 kg)   SpO2 99%   BMI 43.70 kg/m²       Health Maintenance Due   Topic Date Due    Hepatitis C Screening  Never done    COVID-19 Vaccine (1 of 2) Never done    Shingrix Vaccine Age 50> (1 of 2) Never done    Colorectal Cancer Screening Combo  Never done    Breast Cancer Screen Mammogram  Never done       1. Have you been to the ER, urgent care clinic since your last visit? Hospitalized since your last visit? No    2. Have you seen or consulted any other health care providers outside of the [address] [place] [address] since your last visit? Include any pap smears or colon screening.  No

## 2021-03-09 NOTE — PROGRESS NOTES
Subjective:  Ms. Daniela Rodriguez is a pleasant 79-year-old lady, who comes in today for follow up of hyperlipidemia. I recently started her on Atorvastatin 20 mg daily. She denies any side effects from the medication. She denies any muscle aches or joint pain. She has no new complaints today. Past Medical History:   Diagnosis Date    Hypertension     Ill-defined condition     PCOS     Past Surgical History:   Procedure Laterality Date    HX GYN      cyst removal    HX ORTHOPAEDIC      L Knee    HX ROTATOR CUFF REPAIR      right    HX TONSILLECTOMY         Current Outpatient Medications on File Prior to Visit   Medication Sig Dispense Refill    methylphenidate ER 36 mg 24 hr tab Take 1 Tab by mouth every morning. Max Daily Amount: 36 mg. 30 Tab 0    atorvastatin (LIPITOR) 20 mg tablet Take 1 Tab by mouth daily. 60 Tab 1    albuterol (PROVENTIL HFA, VENTOLIN HFA, PROAIR HFA) 90 mcg/actuation inhaler INHALE 2 PUFFS BY MOUTH EVERY 4 HOURS AS NEEDED FOR WHEEZING 18 Inhaler 6    azelastine-fluticasone 137-50 mcg/spray spry PLACE 1 SPRAY INTO EACH NOSTRIL EVERY DAY 23 g 6    clonazePAM (KlonoPIN) 0.5 mg tablet TAKE 1 TABLET BY MOUTH EVERY DAY AT BEDTIME AS NEEDED FOR ANXIETY 90 Tab 1    lisinopril-hydroCHLOROthiazide (PRINZIDE, ZESTORETIC) 10-12.5 mg per tablet TAKE 1 TABLET BY MOUTH EVERY DAY 90 Tab 3    primidone (MYSOLINE) 50 mg tablet TAKE 1 TABLET BY MOUTH EVERY DAY 90 Tab 3    methocarbamol (ROBAXIN) 500 mg tablet       cholecalciferol (VITAMIN D3) 2,000 unit cap capsule Take  by mouth two (2) times a day.  meloxicam (MOBIC) 15 mg tablet Take 1 Tab by mouth daily. 30 Tab 0    doxycycline (VIBRAMYCIN) 100 mg capsule       olopatadine (PATANOL) 0.1 % ophthalmic solution Administer 2 Drops to both eyes two (2) times a day. 15 mL 6    cetirizine (ZYRTEC) 10 mg tablet Take  by mouth.  multivitamin (ONE A DAY) tablet Take 1 Tab by mouth daily.  Indications: Flinstone chewable      omeprazole (PRILOSEC) 40 mg capsule Take 40 mg by mouth daily. No current facility-administered medications on file prior to visit. Allergies   Allergen Reactions    Pcn [Penicillins] Anaphylaxis    Zithromax [Azithromycin] Anaphylaxis     Physical Examination:  GENERAL:  Pleasant lady in no acute distress. She is alert and oriented. She answers my questions appropriately. VITALS:  Blood Pressure:  118/76. Pulse: 79.  Respirations:  18.  Temperature: 97.4. O2 sat:  99. HEENT:  Normocephalic, atraumatic. NECK:  Supple without adenopathy. CHEST:  Lungs clear to auscultation, no rales or wheezes. CV:  Heart regular rhythm without murmur or gallop. EXTREMITIES:  No edema or calf tenderness. Distal pulses were present. Impression:  1. Hyperlipidemia. Plan:  1. While in the office today we did repeat a lipid profile, as well as comprehensive metabolic, and I will call her as soon as I have these results. 2. Otherwise she will continue with her current regimen and we will continue to see her every three months.

## 2021-03-09 NOTE — PATIENT INSTRUCTIONS

## 2021-03-10 LAB
A-G RATIO,AGRAT: 1.6 RATIO
ALBUMIN SERPL-MCNC: 4.4 G/DL (ref 3.9–5.4)
ALP SERPL-CCNC: 105 U/L (ref 38–126)
ALT SERPL-CCNC: 26 U/L (ref 0–35)
ANION GAP SERPL CALC-SCNC: 13 MMOL/L
AST SERPL W P-5'-P-CCNC: 38 U/L (ref 14–36)
BILIRUB SERPL-MCNC: 0.6 MG/DL (ref 0.2–1.3)
BUN SERPL-MCNC: 7 MG/DL (ref 7–17)
BUN/CREATININE RATIO,BUCR: 10 RATIO
CALCIUM SERPL-MCNC: 9.7 MG/DL (ref 8.4–10.2)
CHLORIDE SERPL-SCNC: 100 MMOL/L (ref 98–107)
CHOL/HDL RATIO,CHHD: 3 RATIO (ref 0–4)
CHOLEST SERPL-MCNC: 185 MG/DL (ref 0–200)
CO2 SERPL-SCNC: 27 MMOL/L (ref 22–32)
CREAT SERPL-MCNC: 0.7 MG/DL (ref 0.7–1.2)
GLOBULIN,GLOB: 2.8
GLUCOSE SERPL-MCNC: 84 MG/DL (ref 65–105)
HCV AB S/CO SERPL IA: <0.1 S/CO RATIO (ref 0–0.9)
HDLC SERPL-MCNC: 60 MG/DL (ref 35–130)
LDL/HDL RATIO,LDHD: 1 RATIO
LDLC SERPL CALC-MCNC: 88 MG/DL (ref 0–130)
POTASSIUM SERPL-SCNC: 3.9 MMOL/L (ref 3.6–5)
PROT SERPL-MCNC: 7.2 G/DL (ref 6.3–8.2)
SODIUM SERPL-SCNC: 140 MMOL/L (ref 137–145)
TRIGL SERPL-MCNC: 183 MG/DL (ref 0–200)
VLDLC SERPL CALC-MCNC: 37 MG/DL

## 2021-03-16 RX ORDER — ATORVASTATIN CALCIUM 20 MG/1
20 TABLET, FILM COATED ORAL DAILY
Qty: 90 TAB | Refills: 3 | Status: SHIPPED | OUTPATIENT
Start: 2021-03-16 | End: 2022-05-04 | Stop reason: SDUPTHER

## 2021-03-16 NOTE — PROGRESS NOTES
Lab results discussed with patient. Lipid excellent. Continue Lipitor 20 mg daily. Follow up 3 months.

## 2021-03-23 DIAGNOSIS — F90.9 ATTENTION DEFICIT HYPERACTIVITY DISORDER (ADHD), UNSPECIFIED ADHD TYPE: ICD-10-CM

## 2021-03-23 NOTE — TELEPHONE ENCOUNTER
Last Refill: 03/09/2021  Last Visit: 3/9/2021   Next Visit: Visit date not found    Requested Prescriptions     Pending Prescriptions Disp Refills    methylphenidate ER 36 mg 24 hr tab 30 Tab 0     Sig: Take 1 Tab by mouth every morning. Max Daily Amount: 36 mg.

## 2021-03-25 RX ORDER — METHYLPHENIDATE HYDROCHLORIDE 36 MG/1
36 TABLET ORAL
Qty: 30 TAB | Refills: 0 | Status: SHIPPED | OUTPATIENT
Start: 2021-03-25 | End: 2021-04-22 | Stop reason: SDUPTHER

## 2021-04-22 DIAGNOSIS — F90.9 ATTENTION DEFICIT HYPERACTIVITY DISORDER (ADHD), UNSPECIFIED ADHD TYPE: ICD-10-CM

## 2021-04-22 NOTE — TELEPHONE ENCOUNTER
Last Refill: 03/25/21  Last Visit: 3/9/2021   Next Visit: Visit date not found    Requested Prescriptions     Pending Prescriptions Disp Refills    methylphenidate ER 36 mg 24 hr tab 30 Tab 0     Sig: Take 1 Tab by mouth every morning. Max Daily Amount: 36 mg.

## 2021-04-23 RX ORDER — METHYLPHENIDATE HYDROCHLORIDE 36 MG/1
36 TABLET ORAL
Qty: 30 TAB | Refills: 0 | Status: SHIPPED | OUTPATIENT
Start: 2021-04-23 | End: 2021-05-26 | Stop reason: SDUPTHER

## 2021-05-06 DIAGNOSIS — G47.00 INSOMNIA, UNSPECIFIED TYPE: ICD-10-CM

## 2021-05-06 RX ORDER — CLONAZEPAM 0.5 MG/1
TABLET ORAL
Qty: 90 TAB | Refills: 1 | Status: SHIPPED | OUTPATIENT
Start: 2021-05-06 | End: 2021-10-31

## 2021-05-26 DIAGNOSIS — F90.9 ATTENTION DEFICIT HYPERACTIVITY DISORDER (ADHD), UNSPECIFIED ADHD TYPE: ICD-10-CM

## 2021-05-26 RX ORDER — METHYLPHENIDATE HYDROCHLORIDE 36 MG/1
36 TABLET ORAL
Qty: 30 TABLET | Refills: 0 | Status: SHIPPED | OUTPATIENT
Start: 2021-05-26 | End: 2021-06-28 | Stop reason: SDUPTHER

## 2021-05-26 RX ORDER — OLOPATADINE HYDROCHLORIDE 1 MG/ML
2 SOLUTION/ DROPS OPHTHALMIC 2 TIMES DAILY
Qty: 15 ML | Refills: 6 | Status: SHIPPED | OUTPATIENT
Start: 2021-05-26

## 2021-05-26 NOTE — TELEPHONE ENCOUNTER
Last Refill: 05/04/19, 12/19/18  Last Visit: 3/9/2021   Next Visit: Visit date not found    Requested Prescriptions     Pending Prescriptions Disp Refills    methylphenidate ER 36 mg 24 hr tab 30 Tablet 0     Sig: Take 1 Tablet by mouth every morning. Max Daily Amount: 36 mg.    olopatadine (PATANOL) 0.1 % ophthalmic solution 15 mL 6     Sig: Administer 2 Drops to both eyes two (2) times a day.

## 2021-06-11 RX ORDER — LISINOPRIL AND HYDROCHLOROTHIAZIDE 10; 12.5 MG/1; MG/1
TABLET ORAL
Qty: 90 TABLET | Refills: 3 | Status: SHIPPED | OUTPATIENT
Start: 2021-06-11 | End: 2022-06-24 | Stop reason: SDUPTHER

## 2021-06-11 RX ORDER — PRIMIDONE 50 MG/1
TABLET ORAL
Qty: 90 TABLET | Refills: 3 | Status: SHIPPED | OUTPATIENT
Start: 2021-06-11 | End: 2022-06-24 | Stop reason: SDUPTHER

## 2021-06-28 DIAGNOSIS — F90.9 ATTENTION DEFICIT HYPERACTIVITY DISORDER (ADHD), UNSPECIFIED ADHD TYPE: ICD-10-CM

## 2021-06-28 RX ORDER — METHYLPHENIDATE HYDROCHLORIDE 36 MG/1
36 TABLET ORAL
Qty: 30 TABLET | Refills: 0 | Status: SHIPPED | OUTPATIENT
Start: 2021-06-28 | End: 2021-07-30 | Stop reason: SDUPTHER

## 2021-06-28 NOTE — TELEPHONE ENCOUNTER
Last Refill: 05/26/21  Last Visit: 3/9/2021   Next Visit: Visit date not found    Requested Prescriptions     Pending Prescriptions Disp Refills    methylphenidate ER 36 mg 24 hr tab 30 Tablet 0     Sig: Take 1 Tablet by mouth every morning. Max Daily Amount: 36 mg.

## 2021-07-30 DIAGNOSIS — F90.9 ATTENTION DEFICIT HYPERACTIVITY DISORDER (ADHD), UNSPECIFIED ADHD TYPE: ICD-10-CM

## 2021-07-30 NOTE — TELEPHONE ENCOUNTER
Last Refill:06/28/21  Last Visit: 3/9/2021   Next Visit: Visit date not found    Requested Prescriptions     Pending Prescriptions Disp Refills    methylphenidate ER 36 mg 24 hr tab 30 Tablet 0     Sig: Take 1 Tablet by mouth every morning. Max Daily Amount: 36 mg.

## 2021-08-02 RX ORDER — METHYLPHENIDATE HYDROCHLORIDE 36 MG/1
36 TABLET ORAL
Qty: 30 TABLET | Refills: 0 | Status: SHIPPED | OUTPATIENT
Start: 2021-08-02 | End: 2021-08-31 | Stop reason: SDUPTHER

## 2021-08-31 DIAGNOSIS — F90.9 ATTENTION DEFICIT HYPERACTIVITY DISORDER (ADHD), UNSPECIFIED ADHD TYPE: ICD-10-CM

## 2021-08-31 RX ORDER — METHYLPHENIDATE HYDROCHLORIDE 36 MG/1
36 TABLET ORAL
Qty: 30 TABLET | Refills: 0 | Status: SHIPPED | OUTPATIENT
Start: 2021-08-31 | End: 2021-09-30 | Stop reason: SDUPTHER

## 2021-08-31 NOTE — TELEPHONE ENCOUNTER
Last Refill: 08/02/21  Last Visit: 3/9/2021   Next Visit: Visit date not found    Requested Prescriptions     Pending Prescriptions Disp Refills    methylphenidate ER 36 mg 24 hr tab 30 Tablet 0     Sig: Take 1 Tablet by mouth every morning. Max Daily Amount: 36 mg.

## 2021-09-30 DIAGNOSIS — F90.9 ATTENTION DEFICIT HYPERACTIVITY DISORDER (ADHD), UNSPECIFIED ADHD TYPE: ICD-10-CM

## 2021-09-30 RX ORDER — METHYLPHENIDATE HYDROCHLORIDE 36 MG/1
36 TABLET ORAL
Qty: 30 TABLET | Refills: 0 | Status: SHIPPED | OUTPATIENT
Start: 2021-09-30 | End: 2021-10-27 | Stop reason: SDUPTHER

## 2021-09-30 NOTE — TELEPHONE ENCOUNTER
Last Refill: 8/31/21  Last Visit: Visit date not found   Next Visit: Visit date not found    Requested Prescriptions     Pending Prescriptions Disp Refills    methylphenidate ER 36 mg 24 hr tab 30 Tablet 0     Sig: Take 1 Tablet by mouth every morning. Max Daily Amount: 36 mg.

## 2021-10-27 DIAGNOSIS — F90.9 ATTENTION DEFICIT HYPERACTIVITY DISORDER (ADHD), UNSPECIFIED ADHD TYPE: ICD-10-CM

## 2021-10-27 RX ORDER — METHYLPHENIDATE HYDROCHLORIDE 36 MG/1
36 TABLET ORAL
Qty: 30 TABLET | Refills: 0 | Status: SHIPPED | OUTPATIENT
Start: 2021-10-27 | End: 2021-11-30 | Stop reason: SDUPTHER

## 2021-10-27 NOTE — TELEPHONE ENCOUNTER
Last Refill: 09/30/21  Last Visit: 03/09/21  Next Visit: Visit date not found    Requested Prescriptions     Pending Prescriptions Disp Refills    methylphenidate ER 36 mg 24 hr tab 30 Tablet 0     Sig: Take 1 Tablet by mouth every morning. Max Daily Amount: 36 mg.

## 2021-11-04 ENCOUNTER — APPOINTMENT (OUTPATIENT)
Dept: GENERAL RADIOLOGY | Age: 51
End: 2021-11-04
Attending: PHYSICIAN ASSISTANT
Payer: COMMERCIAL

## 2021-11-04 ENCOUNTER — HOSPITAL ENCOUNTER (EMERGENCY)
Age: 51
Discharge: HOME | End: 2021-11-04
Attending: EMERGENCY MEDICINE
Payer: COMMERCIAL

## 2021-11-04 VITALS
SYSTOLIC BLOOD PRESSURE: 142 MMHG | RESPIRATION RATE: 16 BRPM | DIASTOLIC BLOOD PRESSURE: 97 MMHG | OXYGEN SATURATION: 99 % | HEART RATE: 95 BPM | TEMPERATURE: 97.9 F

## 2021-11-04 DIAGNOSIS — S63.259A CLOSED DISLOCATION OF FINGER OF LEFT HAND, INITIAL ENCOUNTER: Primary | ICD-10-CM
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
## 2021-11-04 DIAGNOSIS — T07.XXXA MULTIPLE ABRASIONS: ICD-10-CM

## 2021-11-04 DIAGNOSIS — S13.4XXA WHIPLASH INJURIES, INITIAL ENCOUNTER: ICD-10-CM

## 2021-11-04 PROCEDURE — 74011000250 HC RX REV CODE- 250: Performed by: PHYSICIAN ASSISTANT

## 2021-11-04 PROCEDURE — 74011250637 HC RX REV CODE- 250/637: Performed by: PHYSICIAN ASSISTANT

## 2021-11-04 PROCEDURE — 75810000303 HC CLSD TRMT  FRACTURE/DISLOCATION W/  ANES

## 2021-11-04 PROCEDURE — 99284 EMERGENCY DEPT VISIT MOD MDM: CPT

## 2021-11-04 PROCEDURE — 73130 X-RAY EXAM OF HAND: CPT

## 2021-11-04 PROCEDURE — 73140 X-RAY EXAM OF FINGER(S): CPT

## 2021-11-04 PROCEDURE — 73030 X-RAY EXAM OF SHOULDER: CPT

## 2021-11-04 RX ORDER — NAPROXEN 250 MG/1
500 TABLET ORAL ONCE
Status: COMPLETED | OUTPATIENT
Start: 2021-11-04 | End: 2021-11-04

## 2021-11-04 RX ORDER — CYCLOBENZAPRINE HCL 10 MG
10 TABLET ORAL
Qty: 15 TABLET | Refills: 0 | Status: SHIPPED | OUTPATIENT
Start: 2021-11-04 | End: 2021-11-09

## 2021-11-04 RX ORDER — LIDOCAINE HYDROCHLORIDE 20 MG/ML
10 INJECTION, SOLUTION EPIDURAL; INFILTRATION; INTRACAUDAL; PERINEURAL
Status: COMPLETED | OUTPATIENT
Start: 2021-11-04 | End: 2021-11-04

## 2021-11-04 RX ORDER — LORAZEPAM 1 MG/1
0.5 TABLET ORAL
Status: COMPLETED | OUTPATIENT
Start: 2021-11-04 | End: 2021-11-04

## 2021-11-04 RX ORDER — NAPROXEN 250 MG/1
250 TABLET ORAL 2 TIMES DAILY WITH MEALS
Qty: 14 TABLET | Refills: 0 | Status: SHIPPED | OUTPATIENT
Start: 2021-11-04 | End: 2021-11-11

## 2021-11-04 RX ADMIN — LORAZEPAM 0.5 MG: 1 TABLET ORAL at 07:33

## 2021-11-04 RX ADMIN — LIDOCAINE HYDROCHLORIDE 200 MG: 20 INJECTION, SOLUTION INTRAVENOUS at 08:05

## 2021-11-04 RX ADMIN — NAPROXEN 500 MG: 250 TABLET ORAL at 07:33

## 2021-11-04 NOTE — Clinical Note
[facility] EMERGENCY DEPT  [address] [facility] [id number]  [phone]    Work/School Note    Date: 11/4/2021    To Whom It May concern:    Nathalie Jackson was seen and treated today in the emergency room by the following provider(s):  Attending Provider: Nilda Lamb MD  Physician Assistant: Verona Harrison, [id number]. Nathalie Jackson is excused from work/school on 11/4/2021 through 11/7/2021. She is medically clear to return to work/school on 11/8/2021.         Sincerely,          Yue Brush, [id number]
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
## 2021-11-04 NOTE — Clinical Note
[address] 
\[facility]\"" EMERGENCY DEPT 
[address] [facility] [id number] [phone] Work/School Note Date: 11/4/2021 To Whom It May concern: Aicha Damon was seen and treated today in the emergency room by the following provider(s): 
Attending Provider: Dilia Maldonado MD 
Physician Assistant: Lorrie Artis. Sabas Hussein is excused from work/school on 11/4/2021 through 11/7/2021. She is medically clear to return to work/school on 11/8/2021. Sincerely, Lorrie Ramirez

## 2021-11-10 ENCOUNTER — OFFICE VISIT (OUTPATIENT)
Dept: ORTHOPEDIC SURGERY | Age: 51
End: 2021-11-10
Payer: COMMERCIAL

## 2021-11-10 DIAGNOSIS — M79.645 FINGER PAIN, LEFT: ICD-10-CM

## 2021-11-10 DIAGNOSIS — S63.611A SPRAIN OF LEFT INDEX FINGER, INITIAL ENCOUNTER: Primary | ICD-10-CM

## 2021-11-10 PROCEDURE — 99203 OFFICE O/P NEW LOW 30 MIN: CPT | Performed by: PHYSICIAN ASSISTANT
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
## 2021-11-11 VITALS — HEIGHT: 62 IN | WEIGHT: 238 LBS | BODY MASS INDEX: 43.79 KG/M2

## 2021-11-24 ENCOUNTER — HOSPITAL ENCOUNTER (EMERGENCY)
Age: 51
Discharge: HOME | End: 2021-11-24
Attending: EMERGENCY MEDICINE
Payer: COMMERCIAL

## 2021-11-24 ENCOUNTER — APPOINTMENT (OUTPATIENT)
Dept: GENERAL RADIOLOGY | Age: 51
End: 2021-11-24
Attending: STUDENT IN AN ORGANIZED HEALTH CARE EDUCATION/TRAINING PROGRAM
Payer: COMMERCIAL

## 2021-11-24 VITALS
TEMPERATURE: 98 F | HEIGHT: 62 IN | WEIGHT: 237.66 LBS | DIASTOLIC BLOOD PRESSURE: 95 MMHG | OXYGEN SATURATION: 96 % | BODY MASS INDEX: 43.73 KG/M2 | HEART RATE: 115 BPM | RESPIRATION RATE: 18 BRPM | SYSTOLIC BLOOD PRESSURE: 131 MMHG

## 2021-11-24 DIAGNOSIS — F41.9 ACUTE ANXIETY: ICD-10-CM

## 2021-11-24 DIAGNOSIS — M54.50 ACUTE RIGHT-SIDED LOW BACK PAIN WITHOUT SCIATICA: ICD-10-CM

## 2021-11-24 DIAGNOSIS — M43.10 RETROLISTHESIS OF VERTEBRAE: Primary | ICD-10-CM

## 2021-11-24 DIAGNOSIS — M43.16 ANTEROLISTHESIS OF LUMBAR SPINE: ICD-10-CM

## 2021-11-24 PROCEDURE — 99283 EMERGENCY DEPT VISIT LOW MDM: CPT

## 2021-11-24 PROCEDURE — 72100 X-RAY EXAM L-S SPINE 2/3 VWS: CPT

## 2021-11-24 RX ORDER — CYCLOBENZAPRINE HCL 10 MG
10 TABLET ORAL 2 TIMES DAILY
Qty: 14 TABLET | Refills: 0 | Status: SHIPPED | OUTPATIENT
Start: 2021-11-24 | End: 2022-01-28

## 2021-11-24 RX ORDER — NAPROXEN 500 MG/1
500 TABLET ORAL 2 TIMES DAILY WITH MEALS
Qty: 14 TABLET | Refills: 0 | Status: SHIPPED | OUTPATIENT
Start: 2021-11-24 | End: 2021-12-01
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
## 2021-11-30 DIAGNOSIS — F90.9 ATTENTION DEFICIT HYPERACTIVITY DISORDER (ADHD), UNSPECIFIED ADHD TYPE: ICD-10-CM

## 2021-11-30 DIAGNOSIS — G47.00 INSOMNIA, UNSPECIFIED TYPE: ICD-10-CM

## 2021-11-30 RX ORDER — CLONAZEPAM 0.5 MG/1
TABLET ORAL
Qty: 30 TABLET | Refills: 0 | Status: SHIPPED | OUTPATIENT
Start: 2021-11-30 | End: 2021-12-29

## 2021-11-30 RX ORDER — METHYLPHENIDATE HYDROCHLORIDE 36 MG/1
36 TABLET ORAL
Qty: 30 TABLET | Refills: 0 | Status: SHIPPED | OUTPATIENT
Start: 2021-11-30 | End: 2022-01-11 | Stop reason: SDUPTHER

## 2021-11-30 NOTE — TELEPHONE ENCOUNTER
PCP: Warden Meghan NP    Last appt: Visit date not found  Future Appointments   Date Time Provider Richie David   12/7/2021  1:30 PM Tanner Goodwin NP [facility]       Requested Prescriptions     Pending Prescriptions Disp Refills    clonazePAM (KlonoPIN) 0.5 mg tablet 30 Tablet 0     Sig: TAKE 1 TABLET BY MOUTH EVERY DAY AT BEDTIME AS NEEDED FOR ANXIETY    methylphenidate ER 36 mg 24 hr tab 30 Tablet 0     Sig: Take 1 Tablet by mouth every morning.  Max Daily Amount: 36 mg.       Prior labs and Blood pressures:  BP Readings from Last 3 Encounters:   11/24/21 (!) 131/95   11/04/21 (!) 142/97   03/09/21 118/76     Lab Results   Component Value Date/Time    Sodium 140 03/09/2021 10:27 AM    Potassium 3.9 03/09/2021 10:27 AM    Chloride 100 03/09/2021 10:27 AM    CO2 27.0 03/09/2021 10:27 AM    Anion gap 13 03/09/2021 10:27 AM    Glucose 84 03/09/2021 10:27 AM    BUN 7.0 03/09/2021 10:27 AM    Creatinine 0.7 03/09/2021 10:27 AM    BUN/Creatinine ratio 10 03/09/2021 10:27 AM    GFR est AA >60 03/09/2021 10:27 AM    GFR est non-AA >60 03/09/2021 10:27 AM    Calcium 9.7 03/09/2021 10:27 AM     Lab Results   Component Value Date/Time    Hemoglobin A1c 5.2 01/19/2021 08:44 AM    Hemoglobin A1c (POC) 5.1 05/17/2018 10:39 AM     Lab Results   Component Value Date/Time    Cholesterol, total 185 03/09/2021 10:27 AM    Cholesterol (POC) 206.0 (A) 05/17/2018 10:39 AM    HDL Cholesterol 60 03/09/2021 10:27 AM    HDL Cholesterol (POC) 48.0 05/17/2018 10:39 AM    LDL Cholesterol (POC) 109.0 05/17/2018 10:39 AM    LDL, calculated 88 03/09/2021 10:27 AM    VLDL 37 03/09/2021 10:27 AM    Triglyceride 183 03/09/2021 10:27 AM    Triglycerides (POC) 245.0 (A) 05/17/2018 10:39 AM    CHOL/HDL Ratio 3 03/09/2021 10:27 AM     Lab Results   Component Value Date/Time    VITAMIN D, 25-HYDROXY 40 01/19/2021 08:44 AM       Lab Results   Component Value Date/Time    TSH, 3rd generation 0.83 01/19/2021 08:44 AM

## 2021-12-07 ENCOUNTER — OFFICE VISIT (OUTPATIENT)
Dept: INTERNAL MEDICINE CLINIC | Age: 51
End: 2021-12-07
Payer: COMMERCIAL

## 2021-12-07 VITALS
HEIGHT: 62 IN | DIASTOLIC BLOOD PRESSURE: 72 MMHG | OXYGEN SATURATION: 98 % | HEART RATE: 100 BPM | BODY MASS INDEX: 43.34 KG/M2 | SYSTOLIC BLOOD PRESSURE: 128 MMHG | WEIGHT: 235.5 LBS | RESPIRATION RATE: 16 BRPM | TEMPERATURE: 97.6 F

## 2021-12-07 DIAGNOSIS — G47.00 INSOMNIA, UNSPECIFIED TYPE: Primary | ICD-10-CM

## 2021-12-07 DIAGNOSIS — E55.9 VITAMIN D DEFICIENCY: ICD-10-CM

## 2021-12-07 DIAGNOSIS — Z00.00 PHYSICAL EXAM: ICD-10-CM

## 2021-12-07 DIAGNOSIS — F90.9 ATTENTION DEFICIT HYPERACTIVITY DISORDER (ADHD), UNSPECIFIED ADHD TYPE: ICD-10-CM

## 2021-12-07 DIAGNOSIS — R53.83 FATIGUE, UNSPECIFIED TYPE: ICD-10-CM

## 2021-12-07 DIAGNOSIS — F41.9 ANXIETY: ICD-10-CM

## 2021-12-07 DIAGNOSIS — Z12.11 SPECIAL SCREENING FOR MALIGNANT NEOPLASMS, COLON: ICD-10-CM

## 2021-12-07 DIAGNOSIS — R73.9 HYPERGLYCEMIA: ICD-10-CM

## 2021-12-07 DIAGNOSIS — E78.2 MIXED HYPERLIPIDEMIA: ICD-10-CM

## 2021-12-07 DIAGNOSIS — M54.50 ACUTE MIDLINE LOW BACK PAIN WITHOUT SCIATICA: ICD-10-CM

## 2021-12-07 LAB
25(OH)D3 SERPL-MCNC: 39.8 NG/ML (ref 30–100)
ALBUMIN SERPL-MCNC: 4.2 G/DL (ref 3.5–5)
ALBUMIN/GLOB SERPL: 1.2 {RATIO} (ref 1.1–2.2)
ALP SERPL-CCNC: 133 U/L (ref 45–117)
ALT SERPL-CCNC: 23 U/L (ref 12–78)
ANION GAP SERPL CALC-SCNC: 9 MMOL/L (ref 5–15)
APPEARANCE UR: CLEAR
AST SERPL-CCNC: 16 U/L (ref 15–37)
BASOPHILS # BLD: 0.1 K/UL (ref 0–0.1)
BASOPHILS NFR BLD: 1 % (ref 0–1)
BILIRUB SERPL-MCNC: 0.3 MG/DL (ref 0.2–1)
BILIRUB UR QL: NEGATIVE
BUN SERPL-MCNC: 5 MG/DL (ref 6–20)
BUN/CREAT SERPL: 6 (ref 12–20)
CALCIUM SERPL-MCNC: 9.4 MG/DL (ref 8.5–10.1)
CHLORIDE SERPL-SCNC: 102 MMOL/L (ref 97–108)
CHOLEST SERPL-MCNC: 159 MG/DL
CO2 SERPL-SCNC: 26 MMOL/L (ref 21–32)
COLOR UR: NORMAL
COMMENT, HOLDF: NORMAL
CREAT SERPL-MCNC: 0.9 MG/DL (ref 0.55–1.02)
DIFFERENTIAL METHOD BLD: ABNORMAL
EOSINOPHIL # BLD: 0.1 K/UL (ref 0–0.4)
EOSINOPHIL NFR BLD: 1 % (ref 0–7)
ERYTHROCYTE [DISTWIDTH] IN BLOOD BY AUTOMATED COUNT: 13.4 % (ref 11.5–14.5)
EST. AVERAGE GLUCOSE BLD GHB EST-MCNC: 97 MG/DL
GLOBULIN SER CALC-MCNC: 3.4 G/DL (ref 2–4)
GLUCOSE SERPL-MCNC: 100 MG/DL (ref 65–100)
GLUCOSE UR STRIP.AUTO-MCNC: NEGATIVE MG/DL
HBA1C MFR BLD: 5 % (ref 4–5.6)
HCT VFR BLD AUTO: 50.4 % (ref 35–47)
HDLC SERPL-MCNC: 54 MG/DL
HDLC SERPL: 2.9 {RATIO} (ref 0–5)
HGB BLD-MCNC: 16.3 G/DL (ref 11.5–16)
HGB UR QL STRIP: NEGATIVE
IMM GRANULOCYTES # BLD AUTO: 0.1 K/UL (ref 0–0.04)
IMM GRANULOCYTES NFR BLD AUTO: 0 % (ref 0–0.5)
KETONES UR QL STRIP.AUTO: NEGATIVE MG/DL
LDLC SERPL CALC-MCNC: 81.2 MG/DL (ref 0–100)
LEUKOCYTE ESTERASE UR QL STRIP.AUTO: NEGATIVE
LYMPHOCYTES # BLD: 2.2 K/UL (ref 0.8–3.5)
LYMPHOCYTES NFR BLD: 19 % (ref 12–49)
MCH RBC QN AUTO: 29.1 PG (ref 26–34)
MCHC RBC AUTO-ENTMCNC: 32.3 G/DL (ref 30–36.5)
MCV RBC AUTO: 90 FL (ref 80–99)
MONOCYTES # BLD: 0.7 K/UL (ref 0–1)
MONOCYTES NFR BLD: 6 % (ref 5–13)
NEUTS SEG # BLD: 8.3 K/UL (ref 1.8–8)
NEUTS SEG NFR BLD: 73 % (ref 32–75)
NITRITE UR QL STRIP.AUTO: NEGATIVE
NRBC # BLD: 0 K/UL (ref 0–0.01)
NRBC BLD-RTO: 0 PER 100 WBC
PH UR STRIP: 6.5 [PH] (ref 5–8)
PLATELET # BLD AUTO: 273 K/UL (ref 150–400)
PMV BLD AUTO: 10.4 FL (ref 8.9–12.9)
POTASSIUM SERPL-SCNC: 3.7 MMOL/L (ref 3.5–5.1)
PROT SERPL-MCNC: 7.6 G/DL (ref 6.4–8.2)
PROT UR STRIP-MCNC: NEGATIVE MG/DL
RBC # BLD AUTO: 5.6 M/UL (ref 3.8–5.2)
SAMPLES BEING HELD,HOLD: NORMAL
SODIUM SERPL-SCNC: 137 MMOL/L (ref 136–145)
SP GR UR REFRACTOMETRY: 1 (ref 1–1.03)
T4 FREE SERPL-MCNC: 1.1 NG/DL (ref 0.8–1.5)
TRIGL SERPL-MCNC: 119 MG/DL (ref ?–150)
TSH SERPL DL<=0.05 MIU/L-ACNC: 1.01 UIU/ML (ref 0.36–3.74)
UROBILINOGEN UR QL STRIP.AUTO: 0.2 EU/DL (ref 0.2–1)
VLDLC SERPL CALC-MCNC: 23.8 MG/DL
WBC # BLD AUTO: 11.5 K/UL (ref 3.6–11)

## 2021-12-07 PROCEDURE — 99396 PREV VISIT EST AGE 40-64: CPT | Performed by: NURSE PRACTITIONER

## 2021-12-07 NOTE — PROGRESS NOTES
Kayren Area is a 46 y.o. female     Chief Complaint   Patient presents with    Complete Physical       Visit Vitals  /72 (BP 1 Location: Left arm, BP Patient Position: Sitting, BP Cuff Size: Adult)   Pulse 100   Temp 97.6 °F (36.4 °C) (Temporal)   Resp 16   Ht 5' 2\" (1.575 m)   Wt 235 lb 8 oz (106.8 kg)   SpO2 98%   BMI 43.07 kg/m²       Health Maintenance Due   Topic Date Due    Cervical cancer screen  Never done    Colorectal Cancer Screening Combo  Never done    Shingrix Vaccine Age 50> (1 of 2) Never done    Low dose CT lung screening  Never done    Breast Cancer Screen Mammogram  Never done    Flu Vaccine (1) 09/01/2021    COVID-19 Vaccine (3 - Booster for Pfizer series) 11/01/2021       1. Have you been to the ER, urgent care clinic since your last visit? Hospitalized since your last visit? No     2. Have you seen or consulted any other health care providers outside of the [address] [place] [address] since your last visit? Include any pap smears or colon screening.  No
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
## 2021-12-07 NOTE — PATIENT INSTRUCTIONS
Colon Cancer Screening: Care Instructions  Overview     Colorectal cancer occurs in the colon or rectum. That's the lower part of your digestive system. It often starts in small growths called polyps in the colon or rectum. Polyps are usually found with screening tests. Depending on the type of test, any polyps found may be removed during the tests. Colorectal cancer usually does not cause symptoms at first. But regular tests can help find it early, before it spreads and becomes harder to treat. Your risk for colorectal cancer gets higher as you get older. Experts recommend starting screening at age 39 for people who are at average risk. Talk with your doctor about your risk and when to start and stop screening. You may have one of several tests. Follow-up care is a key part of your treatment and safety. Be sure to make and go to all appointments, and call your doctor if you are having problems. It's also a good idea to know your test results and keep a list of the medicines you take. What are the main screening tests for colon cancer? The screening tests are:  Stool tests. These include the guaiac fecal occult blood test (gFOBT), the fecal immunochemical test (FIT), and the combined fecal immunochemical test and stool DNA test (FIT-DNA). These tests check stool samples for signs of cancer. If your test is positive, you will need to have a colonoscopy. Sigmoidoscopy. This test lets your doctor look at the lining of your rectum and the lowest part of your colon. Your doctor uses a lighted tube called a sigmoidoscope. This test can't find cancers or polyps in the upper part of your colon. In some cases, polyps that are found can be removed. But if your doctor finds polyps, you will need to have a colonoscopy to check the upper part of your colon. Colonoscopy. This test lets your doctor look at the lining of your rectum and your entire colon. The doctor uses a thin, flexible tool called a colonoscope. It can also be used to remove polyps or get a tissue sample (biopsy). A less common test is CT colonography (CTC). It's also called virtual colonoscopy. Who should be screened for colorectal cancer? Your risk for colorectal cancer gets higher as you get older. Experts recommend starting screening at age 39 for people who are at average risk. Talk with your doctor about your risk and when to start and stop screening. How often you need screening depends on the type of test you get:  Stool tests. Every year for FIT or gFOBT. Every 1 to 3 years for sDNA, also called FIT-DNA. Tests that look inside the colon. Every 5 years for sigmoidoscopy. (If you do the FIT test every year, you can get this test every 10 years.)   Every 5 years for CT colonography (virtual colonoscopy). Every 10 years for colonoscopy. Experts agree that people at higher risk may need to be tested sooner and more often. This includes people who have a strong family history of colon cancer. Talk to your doctor about which test is best for you and when to be tested. When should you call for help? Watch closely for changes in your health, and be sure to contact your doctor if:    · You have any changes in your bowel habits.     · You have any problems. Where can you learn more? Go to http://www.gray.com/  Enter M541 in the search box to learn more about \"Colon Cancer Screening: Care Instructions. \"  Current as of: December 17, 2020               Content Version: 13.0  © 9872-1106 [employer]. Care instructions adapted under license by "[employer] (which disclaims liability or warranty for this information). If you have questions about a medical condition or this instruction, always ask your healthcare professional. [place] [address] any warranty or liability for your use of this information.

## 2022-01-11 ENCOUNTER — PATIENT MESSAGE (OUTPATIENT)
Dept: INTERNAL MEDICINE CLINIC | Age: 52
End: 2022-01-11

## 2022-01-11 ENCOUNTER — OFFICE VISIT (OUTPATIENT)
Dept: ORTHOPEDIC SURGERY | Age: 52
End: 2022-01-11
Payer: COMMERCIAL

## 2022-01-11 VITALS — WEIGHT: 235 LBS | HEIGHT: 62 IN | BODY MASS INDEX: 43.24 KG/M2

## 2022-01-11 DIAGNOSIS — M79.645 FINGER PAIN, LEFT: ICD-10-CM

## 2022-01-11 DIAGNOSIS — S63.611D: Primary | ICD-10-CM

## 2022-01-11 DIAGNOSIS — S63.418A: ICD-10-CM

## 2022-01-11 DIAGNOSIS — F90.9 ATTENTION DEFICIT HYPERACTIVITY DISORDER (ADHD), UNSPECIFIED ADHD TYPE: ICD-10-CM

## 2022-01-11 PROCEDURE — 99213 OFFICE O/P EST LOW 20 MIN: CPT | Performed by: PHYSICIAN ASSISTANT

## 2022-01-11 RX ORDER — METHYLPHENIDATE HYDROCHLORIDE 36 MG/1
36 TABLET ORAL
Qty: 30 TABLET | Refills: 0 | Status: SHIPPED | OUTPATIENT
Start: 2022-01-11 | End: 2022-02-08 | Stop reason: SDUPTHER
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
## 2022-01-11 NOTE — TELEPHONE ENCOUNTER
From: Zahira Townsend  To: [facility]  Sent: 1/11/2022 6:11 AM EST  Subject: Refill    I need refill for concerya 36mg er sent to [facility]

## 2022-01-26 ENCOUNTER — OFFICE VISIT (OUTPATIENT)
Dept: ORTHOPEDIC SURGERY | Age: 52
End: 2022-01-26
Payer: COMMERCIAL

## 2022-01-26 DIAGNOSIS — M25.542 PAIN INVOLVING JOINT OF FINGER OF LEFT HAND: ICD-10-CM

## 2022-01-26 DIAGNOSIS — S63.611D: Primary | ICD-10-CM

## 2022-01-26 PROCEDURE — 99213 OFFICE O/P EST LOW 20 MIN: CPT | Performed by: PHYSICIAN ASSISTANT
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
## 2022-01-28 VITALS — BODY MASS INDEX: 43.24 KG/M2 | HEIGHT: 62 IN | WEIGHT: 235 LBS

## 2022-02-08 DIAGNOSIS — F90.9 ATTENTION DEFICIT HYPERACTIVITY DISORDER (ADHD), UNSPECIFIED ADHD TYPE: ICD-10-CM

## 2022-02-08 RX ORDER — METHYLPHENIDATE HYDROCHLORIDE 36 MG/1
36 TABLET ORAL
Qty: 30 TABLET | Refills: 0 | Status: SHIPPED | OUTPATIENT
Start: 2022-02-08 | End: 2022-03-09 | Stop reason: SDUPTHER

## 2022-02-08 NOTE — TELEPHONE ENCOUNTER
----- Message from [address] [place] [address].  Carlos sent at 2/8/2022  7:50 AM EST -----  Regarding: Refill  I need refill for Concerta 99RL er sent to [facility] please

## 2022-03-09 DIAGNOSIS — F90.9 ATTENTION DEFICIT HYPERACTIVITY DISORDER (ADHD), UNSPECIFIED ADHD TYPE: ICD-10-CM

## 2022-03-09 NOTE — TELEPHONE ENCOUNTER
PCP: Lindy Chawla NP    Last appt: 12/7/2021  Future Appointments   Date Time Provider Richie David   6/8/2022  9:30 AM Kerrie Urbina NP [facility] BS AMB       Requested Prescriptions     Pending Prescriptions Disp Refills    methylphenidate ER 36 mg 24 hr tab 30 Tablet 0     Sig: Take 1 Tablet by mouth every morning.  Max Daily Amount: 36 mg.       Prior labs and Blood pressures:  BP Readings from Last 3 Encounters:   12/07/21 128/72   11/24/21 (!) 131/95   11/04/21 (!) 142/97     Lab Results   Component Value Date/Time    Sodium 137 12/07/2021 02:33 PM    Potassium 3.7 12/07/2021 02:33 PM    Chloride 102 12/07/2021 02:33 PM    CO2 26 12/07/2021 02:33 PM    Anion gap 9 12/07/2021 02:33 PM    Glucose 100 12/07/2021 02:33 PM    BUN 5 (L) 12/07/2021 02:33 PM    Creatinine 0.90 12/07/2021 02:33 PM    BUN/Creatinine ratio 6 (L) 12/07/2021 02:33 PM    GFR est AA >60 12/07/2021 02:33 PM    GFR est non-AA >60 12/07/2021 02:33 PM    Calcium 9.4 12/07/2021 02:33 PM     Lab Results   Component Value Date/Time    Hemoglobin A1c 5.0 12/07/2021 02:33 PM    Hemoglobin A1c (POC) 5.1 05/17/2018 10:39 AM     Lab Results   Component Value Date/Time    Cholesterol, total 159 12/07/2021 02:33 PM    Cholesterol (POC) 206.0 (A) 05/17/2018 10:39 AM    HDL Cholesterol 54 12/07/2021 02:33 PM    HDL Cholesterol (POC) 48.0 05/17/2018 10:39 AM    LDL Cholesterol (POC) 109.0 05/17/2018 10:39 AM    LDL, calculated 81.2 12/07/2021 02:33 PM    VLDL, calculated 23.8 12/07/2021 02:33 PM    Triglyceride 119 12/07/2021 02:33 PM    Triglycerides (POC) 245.0 (A) 05/17/2018 10:39 AM    CHOL/HDL Ratio 2.9 12/07/2021 02:33 PM     Lab Results   Component Value Date/Time    Vitamin D 25-Hydroxy 39.8 12/07/2021 02:33 PM       Lab Results   Component Value Date/Time    TSH 1.01 12/07/2021 02:33 PM    TSH, 3rd generation 0.83 01/19/2021 08:44 AM

## 2022-03-10 RX ORDER — METHYLPHENIDATE HYDROCHLORIDE 36 MG/1
36 TABLET ORAL
Qty: 30 TABLET | Refills: 0 | Status: SHIPPED | OUTPATIENT
Start: 2022-03-10 | End: 2022-04-07 | Stop reason: SDUPTHER

## 2022-03-18 PROBLEM — S63.611D: Status: ACTIVE | Noted: 2022-01-11

## 2022-03-18 PROBLEM — E66.01 OBESITY, MORBID (HCC): Status: ACTIVE | Noted: 2018-05-10

## 2022-03-19 PROBLEM — M79.645 FINGER PAIN, LEFT: Status: ACTIVE | Noted: 2022-01-11

## 2022-03-19 PROBLEM — S63.418A: Status: ACTIVE | Noted: 2022-01-11

## 2022-03-28 RX ORDER — AZELASTINE HYDROCHLORIDE, FLUTICASONE PROPIONATE 137; 50 UG/1; UG/1
SPRAY, METERED NASAL
Qty: 23 G | Refills: 6 | Status: SHIPPED | OUTPATIENT
Start: 2022-03-28

## 2022-03-28 NOTE — TELEPHONE ENCOUNTER
PCP: Marshal Brody NP    Last appt: 12/7/2021  Future Appointments   Date Time Provider Richie David   6/8/2022  9:30 AM Sobia Damon NP [facility] AMB       Requested Prescriptions     Pending Prescriptions Disp Refills    azelastine-fluticasone 137-50 mcg/spray spry 23 g 6       Prior labs and Blood pressures:  BP Readings from Last 3 Encounters:   12/07/21 128/72   11/24/21 (!) 131/95   11/04/21 (!) 142/97     Lab Results   Component Value Date/Time    Sodium 137 12/07/2021 02:33 PM    Potassium 3.7 12/07/2021 02:33 PM    Chloride 102 12/07/2021 02:33 PM    CO2 26 12/07/2021 02:33 PM    Anion gap 9 12/07/2021 02:33 PM    Glucose 100 12/07/2021 02:33 PM    BUN 5 (L) 12/07/2021 02:33 PM    Creatinine 0.90 12/07/2021 02:33 PM    BUN/Creatinine ratio 6 (L) 12/07/2021 02:33 PM    GFR est AA >60 12/07/2021 02:33 PM    GFR est non-AA >60 12/07/2021 02:33 PM    Calcium 9.4 12/07/2021 02:33 PM     Lab Results   Component Value Date/Time    Hemoglobin A1c 5.0 12/07/2021 02:33 PM    Hemoglobin A1c (POC) 5.1 05/17/2018 10:39 AM     Lab Results   Component Value Date/Time    Cholesterol, total 159 12/07/2021 02:33 PM    Cholesterol (POC) 206.0 (A) 05/17/2018 10:39 AM    HDL Cholesterol 54 12/07/2021 02:33 PM    HDL Cholesterol (POC) 48.0 05/17/2018 10:39 AM    LDL Cholesterol (POC) 109.0 05/17/2018 10:39 AM    LDL, calculated 81.2 12/07/2021 02:33 PM    VLDL, calculated 23.8 12/07/2021 02:33 PM    Triglyceride 119 12/07/2021 02:33 PM    Triglycerides (POC) 245.0 (A) 05/17/2018 10:39 AM    CHOL/HDL Ratio 2.9 12/07/2021 02:33 PM     Lab Results   Component Value Date/Time    Vitamin D 25-Hydroxy 39.8 12/07/2021 02:33 PM       Lab Results   Component Value Date/Time    TSH 1.01 12/07/2021 02:33 PM    TSH, 3rd generation 0.83 01/19/2021 08:44 AM

## 2022-04-07 DIAGNOSIS — F90.9 ATTENTION DEFICIT HYPERACTIVITY DISORDER (ADHD), UNSPECIFIED ADHD TYPE: ICD-10-CM

## 2022-04-07 RX ORDER — METHYLPHENIDATE HYDROCHLORIDE 36 MG/1
36 TABLET ORAL
Qty: 30 TABLET | Refills: 0 | Status: SHIPPED | OUTPATIENT
Start: 2022-04-07 | End: 2022-05-09 | Stop reason: SDUPTHER

## 2022-04-07 NOTE — TELEPHONE ENCOUNTER
PCP: Colonel Deborah NP    Last appt: 12/7/2021  Future Appointments   Date Time Provider Richie David   6/8/2022  9:30 AM Hemalatha Connell NP [facility] AMB       Requested Prescriptions     Pending Prescriptions Disp Refills    methylphenidate ER 36 mg 24 hr tab 30 Tablet 0     Sig: Take 1 Tablet by mouth every morning.  Max Daily Amount: 36 mg.       Prior labs and Blood pressures:  BP Readings from Last 3 Encounters:   12/07/21 128/72   11/24/21 (!) 131/95   11/04/21 (!) 142/97     Lab Results   Component Value Date/Time    Sodium 137 12/07/2021 02:33 PM    Potassium 3.7 12/07/2021 02:33 PM    Chloride 102 12/07/2021 02:33 PM    CO2 26 12/07/2021 02:33 PM    Anion gap 9 12/07/2021 02:33 PM    Glucose 100 12/07/2021 02:33 PM    BUN 5 (L) 12/07/2021 02:33 PM    Creatinine 0.90 12/07/2021 02:33 PM    BUN/Creatinine ratio 6 (L) 12/07/2021 02:33 PM    GFR est AA >60 12/07/2021 02:33 PM    GFR est non-AA >60 12/07/2021 02:33 PM    Calcium 9.4 12/07/2021 02:33 PM     Lab Results   Component Value Date/Time    Hemoglobin A1c 5.0 12/07/2021 02:33 PM    Hemoglobin A1c (POC) 5.1 05/17/2018 10:39 AM     Lab Results   Component Value Date/Time    Cholesterol, total 159 12/07/2021 02:33 PM    Cholesterol (POC) 206.0 (A) 05/17/2018 10:39 AM    HDL Cholesterol 54 12/07/2021 02:33 PM    HDL Cholesterol (POC) 48.0 05/17/2018 10:39 AM    LDL Cholesterol (POC) 109.0 05/17/2018 10:39 AM    LDL, calculated 81.2 12/07/2021 02:33 PM    VLDL, calculated 23.8 12/07/2021 02:33 PM    Triglyceride 119 12/07/2021 02:33 PM    Triglycerides (POC) 245.0 (A) 05/17/2018 10:39 AM    CHOL/HDL Ratio 2.9 12/07/2021 02:33 PM     Lab Results   Component Value Date/Time    Vitamin D 25-Hydroxy 39.8 12/07/2021 02:33 PM       Lab Results   Component Value Date/Time    TSH 1.01 12/07/2021 02:33 PM    TSH, 3rd generation 0.83 01/19/2021 08:44 AM

## 2022-05-09 DIAGNOSIS — F90.9 ATTENTION DEFICIT HYPERACTIVITY DISORDER (ADHD), UNSPECIFIED ADHD TYPE: ICD-10-CM

## 2022-05-09 RX ORDER — METHYLPHENIDATE HYDROCHLORIDE 36 MG/1
36 TABLET ORAL
Qty: 30 TABLET | Refills: 0 | Status: SHIPPED | OUTPATIENT
Start: 2022-05-09 | End: 2022-06-13 | Stop reason: SDUPTHER

## 2022-05-09 NOTE — TELEPHONE ENCOUNTER
PCP: Neha Pascual NP    Last appt: 12/7/2021  Future Appointments   Date Time Provider Richie David   6/8/2022  9:30 AM Real De La Cruz NP [facility] AMB       Last refilled:4/7/22    Requested Prescriptions     Pending Prescriptions Disp Refills    methylphenidate ER 36 mg 24 hr tab 30 Tablet 0     Sig: Take 1 Tablet by mouth every morning. Max Daily Amount: 36 mg.

## 2022-06-08 ENCOUNTER — OFFICE VISIT (OUTPATIENT)
Dept: INTERNAL MEDICINE CLINIC | Age: 52
End: 2022-06-08
Payer: COMMERCIAL

## 2022-06-08 VITALS
WEIGHT: 247.4 LBS | HEIGHT: 62 IN | DIASTOLIC BLOOD PRESSURE: 74 MMHG | OXYGEN SATURATION: 99 % | SYSTOLIC BLOOD PRESSURE: 128 MMHG | HEART RATE: 99 BPM | RESPIRATION RATE: 18 BRPM | BODY MASS INDEX: 45.53 KG/M2 | TEMPERATURE: 98.2 F

## 2022-06-08 DIAGNOSIS — Z79.899 LONG-TERM CURRENT USE OF STIMULANT: ICD-10-CM

## 2022-06-08 DIAGNOSIS — I10 ESSENTIAL HYPERTENSION: ICD-10-CM

## 2022-06-08 DIAGNOSIS — Z79.899 CONTROLLED SUBSTANCE AGREEMENT SIGNED: ICD-10-CM

## 2022-06-08 DIAGNOSIS — E78.2 MIXED HYPERLIPIDEMIA: ICD-10-CM

## 2022-06-08 DIAGNOSIS — G89.29 CHRONIC LEFT-SIDED THORACIC BACK PAIN: ICD-10-CM

## 2022-06-08 DIAGNOSIS — F98.8 ATTENTION DEFICIT DISORDER (ADD) WITHOUT HYPERACTIVITY: Primary | ICD-10-CM

## 2022-06-08 DIAGNOSIS — E55.9 VITAMIN D DEFICIENCY: ICD-10-CM

## 2022-06-08 DIAGNOSIS — E66.01 CLASS 3 SEVERE OBESITY DUE TO EXCESS CALORIES WITH SERIOUS COMORBIDITY AND BODY MASS INDEX (BMI) OF 40.0 TO 44.9 IN ADULT (HCC): ICD-10-CM

## 2022-06-08 DIAGNOSIS — Z79.899 ON STATIN THERAPY: ICD-10-CM

## 2022-06-08 DIAGNOSIS — Z79.899 LONG-TERM CURRENT USE OF BENZODIAZEPINE: ICD-10-CM

## 2022-06-08 DIAGNOSIS — L30.1 DYSHIDROTIC ECZEMA: ICD-10-CM

## 2022-06-08 DIAGNOSIS — M62.830 BACK MUSCLE SPASM: ICD-10-CM

## 2022-06-08 DIAGNOSIS — F41.9 ANXIETY: ICD-10-CM

## 2022-06-08 DIAGNOSIS — M54.6 CHRONIC LEFT-SIDED THORACIC BACK PAIN: ICD-10-CM

## 2022-06-08 DIAGNOSIS — Z87.828 HISTORY OF MOTOR VEHICLE ACCIDENT: ICD-10-CM

## 2022-06-08 PROBLEM — E66.813 CLASS 3 SEVERE OBESITY DUE TO EXCESS CALORIES WITH SERIOUS COMORBIDITY AND BODY MASS INDEX (BMI) OF 40.0 TO 44.9 IN ADULT: Status: ACTIVE | Noted: 2018-05-10

## 2022-06-08 LAB
ALBUMIN SERPL-MCNC: 4.1 G/DL (ref 3.5–5)
ALBUMIN/GLOB SERPL: 1.4 {RATIO} (ref 1.1–2.2)
ALP SERPL-CCNC: 105 U/L (ref 45–117)
ALT SERPL-CCNC: 23 U/L (ref 12–78)
ANION GAP SERPL CALC-SCNC: 6 MMOL/L (ref 5–15)
AST SERPL-CCNC: 15 U/L (ref 15–37)
BILIRUB SERPL-MCNC: 0.4 MG/DL (ref 0.2–1)
BUN SERPL-MCNC: 9 MG/DL (ref 6–20)
BUN/CREAT SERPL: 11 (ref 12–20)
CALCIUM SERPL-MCNC: 9.7 MG/DL (ref 8.5–10.1)
CHLORIDE SERPL-SCNC: 105 MMOL/L (ref 97–108)
CHOLEST SERPL-MCNC: 178 MG/DL
CO2 SERPL-SCNC: 29 MMOL/L (ref 21–32)
CREAT SERPL-MCNC: 0.81 MG/DL (ref 0.55–1.02)
GLOBULIN SER CALC-MCNC: 3 G/DL (ref 2–4)
GLUCOSE SERPL-MCNC: 90 MG/DL (ref 65–100)
HDLC SERPL-MCNC: 46 MG/DL
HDLC SERPL: 3.9 {RATIO} (ref 0–5)
LDLC SERPL CALC-MCNC: 94.6 MG/DL (ref 0–100)
POTASSIUM SERPL-SCNC: 3.7 MMOL/L (ref 3.5–5.1)
PROT SERPL-MCNC: 7.1 G/DL (ref 6.4–8.2)
SODIUM SERPL-SCNC: 140 MMOL/L (ref 136–145)
TRIGL SERPL-MCNC: 187 MG/DL (ref ?–150)
VLDLC SERPL CALC-MCNC: 37.4 MG/DL

## 2022-06-08 PROCEDURE — 99214 OFFICE O/P EST MOD 30 MIN: CPT | Performed by: NURSE PRACTITIONER

## 2022-06-08 RX ORDER — METHOCARBAMOL 500 MG/1
500 TABLET, FILM COATED ORAL
Qty: 90 TABLET | Refills: 1 | Status: SHIPPED | OUTPATIENT
Start: 2022-06-08 | End: 2022-10-11 | Stop reason: SDUPTHER

## 2022-06-08 RX ORDER — TRIAMCINOLONE ACETONIDE 5 MG/G
CREAM TOPICAL 2 TIMES DAILY
Qty: 15 G | Refills: 0 | Status: SHIPPED | OUTPATIENT
Start: 2022-06-08

## 2022-06-08 RX ORDER — ETODOLAC 400 MG/1
400 TABLET, FILM COATED ORAL 2 TIMES DAILY WITH MEALS
Qty: 60 TABLET | Refills: 3 | Status: SHIPPED | OUTPATIENT
Start: 2022-06-08 | End: 2022-10-10

## 2022-06-08 NOTE — PROGRESS NOTES
Aldo Maldonado is a 46 y.o. female     Chief Complaint   Patient presents with    [facility]     establishing care       Visit Vitals  /74 (BP 1 Location: Left arm, BP Patient Position: Sitting, BP Cuff Size: Adult)   Pulse 99   Temp 98.2 °F (36.8 °C) (Oral)   Resp 18   Ht 5' 2\" (1.575 m)   Wt 247 lb 6.4 oz (112.2 kg)   LMP 05/27/2022   SpO2 99%   BMI 45.25 kg/m²       Health Maintenance Due   Topic Date Due    Cervical cancer screen  Never done    Colorectal Cancer Screening Combo  Never done    Shingrix Vaccine Age 50> (1 of 2) Never done    Low dose CT lung screening  Never done    Breast Cancer Screen Mammogram  Never done         1. \"Have you been to the ER, urgent care clinic since your last visit? Hospitalized since your last visit? \" No    2. \"Have you seen or consulted any other health care providers outside of the [address] [place] [address] since your last visit? \" Yes seen Ortho     3. For patients aged 39-70: Has the patient had a colonoscopy / FIT/ Cologuard? No      If the patient is female:    4. For patients aged 41-77: Has the patient had a mammogram within the past 2 years? No      5. For patients aged 21-65: Has the patient had a pap smear?  No
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
## 2022-06-09 NOTE — PROGRESS NOTES
Cholesterol levels look fairly good overall. Metabolic panel is unremarkable. We are awaiting results of urine drug screen.

## 2022-06-10 ENCOUNTER — PATIENT MESSAGE (OUTPATIENT)
Dept: INTERNAL MEDICINE CLINIC | Age: 52
End: 2022-06-10

## 2022-06-10 DIAGNOSIS — F90.9 ATTENTION DEFICIT HYPERACTIVITY DISORDER (ADHD), UNSPECIFIED ADHD TYPE: ICD-10-CM

## 2022-06-13 RX ORDER — METHYLPHENIDATE HYDROCHLORIDE 36 MG/1
36 TABLET ORAL
Qty: 30 TABLET | Refills: 0 | Status: SHIPPED | OUTPATIENT
Start: 2022-06-13 | End: 2022-07-11 | Stop reason: SDUPTHER

## 2022-06-13 NOTE — TELEPHONE ENCOUNTER
From: Celi Yeager  To: Destiny Patton NP  Sent: 6/10/2022 7:31 PM EDT  Subject: Refill request    I need my Concerta 36 MG refilled at [facility] on [address].  Thank you

## 2022-06-15 LAB — DRUGS UR: NORMAL

## 2022-06-24 ENCOUNTER — HOSPITAL ENCOUNTER (OUTPATIENT)
Dept: PHYSICAL THERAPY | Age: 52
Discharge: HOME | End: 2022-06-24
Payer: COMMERCIAL

## 2022-06-24 PROCEDURE — 97162 PT EVAL MOD COMPLEX 30 MIN: CPT

## 2022-06-24 PROCEDURE — 97110 THERAPEUTIC EXERCISES: CPT

## 2022-06-24 RX ORDER — PRIMIDONE 50 MG/1
50 TABLET ORAL DAILY
Qty: 90 TABLET | Refills: 3 | Status: SHIPPED | OUTPATIENT
Start: 2022-06-24

## 2022-06-24 RX ORDER — LISINOPRIL AND HYDROCHLOROTHIAZIDE 10; 12.5 MG/1; MG/1
1 TABLET ORAL DAILY
Qty: 90 TABLET | Refills: 3 | Status: SHIPPED | OUTPATIENT
Start: 2022-06-24
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
## 2022-06-24 NOTE — TELEPHONE ENCOUNTER
PCP: Laina Alexandra NP    Last appt: 6/8/2022  Future Appointments   Date Time Provider Richie David   10/11/2022 10:00 AM Laina Alexandra NP [facility] BS AMB       Requested Prescriptions     Pending Prescriptions Disp Refills    primidone (MYSOLINE) 50 mg tablet 90 Tablet 3     Sig: Take 1 Tablet by mouth daily.  lisinopril-hydroCHLOROthiazide (PRINZIDE, ZESTORETIC) 10-12.5 mg per tablet 90 Tablet 3     Sig: Take 1 Tablet by mouth daily.        Prior labs and Blood pressures:  BP Readings from Last 3 Encounters:   06/08/22 128/74   12/07/21 128/72   11/24/21 (!) 131/95     Lab Results   Component Value Date/Time    Sodium 140 06/08/2022 10:17 AM    Potassium 3.7 06/08/2022 10:17 AM    Chloride 105 06/08/2022 10:17 AM    CO2 29 06/08/2022 10:17 AM    Anion gap 6 06/08/2022 10:17 AM    Glucose 90 06/08/2022 10:17 AM    BUN 9 06/08/2022 10:17 AM    Creatinine 0.81 06/08/2022 10:17 AM    BUN/Creatinine ratio 11 (L) 06/08/2022 10:17 AM    GFR est AA >60 06/08/2022 10:17 AM    GFR est non-AA >60 06/08/2022 10:17 AM    Calcium 9.7 06/08/2022 10:17 AM

## 2022-06-27 DIAGNOSIS — G47.00 INSOMNIA, UNSPECIFIED TYPE: ICD-10-CM

## 2022-06-27 RX ORDER — CLONAZEPAM 0.5 MG/1
TABLET ORAL
Qty: 90 TABLET | Refills: 0 | Status: SHIPPED | OUTPATIENT
Start: 2022-06-27 | End: 2022-09-27

## 2022-06-27 NOTE — TELEPHONE ENCOUNTER
PCP: Page Torres NP    Last appt: 6/8/2022  Future Appointments   Date Time Provider Richie David   7/8/2022  8:30 AM [facility] TECH/OVERFLOW PT [facility] REG   7/11/2022  8:30 AM [facility]M TECH/OVERFLOW PT [facility] REG   7/15/2022  8:30 AM [facility]M TECH/OVERFLOW PT [facility] REG   7/18/2022  8:00 AM [facility] TECH/OVERFLOW PT [facility] REG   7/22/2022  8:30 AM [facility] TECH/OVERFLOW PT [facility] REG   7/29/2022  8:00 AM [facility] TECH/OVERFLOW PT [facility] REG   10/11/2022 10:00 AM Marina Mares NP [facility] BS AMB       Last refilled:12/29/21    Requested Prescriptions     Pending Prescriptions Disp Refills    clonazePAM (KlonoPIN) 0.5 mg tablet 90 Tablet 0     Sig: TAKE 1 TABLET BY MOUTH EVERY DAY AT BEDTIME AS NEEDED FOR ANXIETY

## 2022-07-08 ENCOUNTER — APPOINTMENT (OUTPATIENT)
Dept: PHYSICAL THERAPY | Age: 52
End: 2022-07-08
Payer: COMMERCIAL

## 2022-07-11 ENCOUNTER — HOSPITAL ENCOUNTER (OUTPATIENT)
Dept: PHYSICAL THERAPY | Age: 52
Discharge: HOME | End: 2022-07-11
Payer: COMMERCIAL

## 2022-07-11 DIAGNOSIS — F90.9 ATTENTION DEFICIT HYPERACTIVITY DISORDER (ADHD), UNSPECIFIED ADHD TYPE: ICD-10-CM

## 2022-07-11 PROCEDURE — 97140 MANUAL THERAPY 1/> REGIONS: CPT

## 2022-07-11 PROCEDURE — 97110 THERAPEUTIC EXERCISES: CPT

## 2022-07-11 RX ORDER — METHYLPHENIDATE HYDROCHLORIDE 36 MG/1
36 TABLET ORAL
Qty: 30 TABLET | Refills: 0 | Status: SHIPPED | OUTPATIENT
Start: 2022-07-11 | End: 2022-08-04 | Stop reason: SDUPTHER

## 2022-07-11 NOTE — TELEPHONE ENCOUNTER
PCP: Cherylene Gip, NP    Last appt: 6/8/2022  Future Appointments   Date Time Provider Richie David   7/11/2022  8:30 AM [facility] TECH/OVERFLOW PT [facility] REG   7/15/2022  8:30 AM [facility] TECH/OVERFLOW PT [facility] REG   7/18/2022  8:00 AM [facility] TECH/OVERFLOW PT [facility] REG   7/22/2022  8:30 AM [facility] TECH/OVERFLOW PT [facility] REG   7/29/2022  8:00 AM [facility] TECH/OVERFLOW PT [facility] REG   10/11/2022 10:00 AM Liane Mares NP [facility] BS AMB       Requested Prescriptions     Pending Prescriptions Disp Refills    methylphenidate ER 36 mg 24 hr tab 30 Tablet 0     Sig: Take 1 Tablet by mouth every morning.  Max Daily Amount: 36 mg.       Prior labs and Blood pressures:  BP Readings from Last 3 Encounters:   06/08/22 128/74   12/07/21 128/72   11/24/21 (!) 131/95     Lab Results   Component Value Date/Time    Sodium 140 06/08/2022 10:17 AM    Potassium 3.7 06/08/2022 10:17 AM    Chloride 105 06/08/2022 10:17 AM    CO2 29 06/08/2022 10:17 AM    Anion gap 6 06/08/2022 10:17 AM    Glucose 90 06/08/2022 10:17 AM    BUN 9 06/08/2022 10:17 AM    Creatinine 0.81 06/08/2022 10:17 AM    BUN/Creatinine ratio 11 (L) 06/08/2022 10:17 AM    GFR est AA >60 06/08/2022 10:17 AM    GFR est non-AA >60 06/08/2022 10:17 AM    Calcium 9.7 06/08/2022 10:17 AM     Lab Results   Component Value Date/Time    Hemoglobin A1c 5.0 12/07/2021 02:33 PM    Hemoglobin A1c (POC) 5.1 05/17/2018 10:39 AM     Lab Results   Component Value Date/Time    Cholesterol, total 178 06/08/2022 10:17 AM    Cholesterol (POC) 206.0 (A) 05/17/2018 10:39 AM    HDL Cholesterol 46 06/08/2022 10:17 AM    HDL Cholesterol (POC) 48.0 05/17/2018 10:39 AM    LDL Cholesterol (POC) 109.0 05/17/2018 10:39 AM    LDL, calculated 94.6 06/08/2022 10:17 AM    VLDL, calculated 37.4 06/08/2022 10:17 AM    Triglyceride 187 (H) 06/08/2022 10:17 AM    Triglycerides (POC) 245.0 (A) 05/17/2018 10:39 AM    CHOL/HDL Ratio 3.9 06/08/2022 10:17 AM     Lab Results Component Value Date/Time    Vitamin D 25-Hydroxy 39.8 12/07/2021 02:33 PM       Lab Results   Component Value Date/Time    TSH 1.01 12/07/2021 02:33 PM    TSH, 3rd generation 0.83 01/19/2021 08:44 AM

## 2022-07-11 NOTE — PROGRESS NOTES
PT DAILY TREATMENT NOTE 2-15    Patient Name: Sheldon Malloy  Date:2022  : 1970  [x]  Patient  Verified  Payor: Siria Barahona / Plan: Jack Hooper PPO / Product Type: PPO /    In time: 830  Out time: 0915  Total Treatment Time (min): 45  Visit #:  2    Treatment Area: Other low back pain [M54.59]    SUBJECTIVE  Pain Level (0-10 scale): 10  Any medication changes, allergies to medications, adverse drug reactions, diagnosis change, or new procedure performed?: [x] No    [] Yes (see summary sheet for update)  Subjective functional status/changes:   [] No changes reported  Pt reported back pain has been feeling better since initial evaluation, more localized and not all over her back. Recalls trying to clean kitchen table over July 4th weekend, which required a lot of bending over, and noticed inc in left sided back pain. OBJECTIVE         35 min Therapeutic Exercise:  [] See flow sheet :   Rationale: increase ROM and increase strength to improve the patients ability to perform functional daily tasks with minimal to no pain      10 min Manual Therapy:  STM/LPR left thoracic paraspinals, left UPA joint mobilizations T7-T10   Rationale: decrease pain, increase ROM and increase tissue extensibility  to improve the patients ability to perform functional daily tasks with minimal to no pain. With   [x] TE   [] TA   [] Neuro   [] SC   [] other: Patient Education: [x] Review HEP    [x] Progressed/Changed HEP based on:   [] positioning   [] body mechanics   [] transfers   [] heat/ice application    [] other:      Other Objective/Functional Measures:     Pain Level (0-10 scale) post treatment: 1/10    ASSESSMENT/Changes in Function:   Pt tolerated therapy session well today. Noted point tenderness along left thoracic paraspinals, responded appropriately with TPR with pt report of diminished symptoms.  Pt tolerated progressive LE strengthening exercises, but noted decreased hip clearance during bridges exercise due to glute weakness. Provided verbal cues for posterior pelviv tilt to improve TA activation and pt improved. Pt asked about appropriateness of using pool for exercise. Discussed benefits of aquatic therapy and use for better symptom management. Patient will continue to benefit from skilled PT services to modify and progress therapeutic interventions, address functional mobility deficits, address ROM deficits and address strength deficits to attain remaining goals. [x]  See Plan of Care  []  See progress note/recertification  []  See Discharge Summary         Progress towards goals / Updated goals:  Short Term Goals: To be accomplished in 6-8 treatments:               Patient will demonstrate understanding of and compliance with HEP showing full participation in physical therapy. Patient will demonstrate thoracolumbar rotation in both directions WNL without onset of symptoms toward improved mobility. Patient will improve 30 second sit to stand test to perform 12 showing improving functional mobility. Patient will demonstrate single limbe postural control >/= 10 seconds with eyes closed toward improved stability with prolonged walking tasks. Long Term Goals: To be accomplished in 12-16 treatments:               Patient will report minimal to no pain during yardwork to show improving functional mobility and participation in daily tasks. Patient will improve FOTO score by the [employer] 112 of 3 to >/= 61 showing significant improvement in their self-reported level of function.                 Patient will improve single limb heel raise test to completing 12 repetitions on each leg showing improved LE strength and functional mobility    PLAN  [x]  Upgrade activities as tolerated     [x]  Continue plan of care  [x]  Update interventions per flow sheet       []  Discharge due to:_  []  Other:_      Mike Jones, PT 7/11/2022

## 2022-07-15 ENCOUNTER — HOSPITAL ENCOUNTER (OUTPATIENT)
Dept: PHYSICAL THERAPY | Age: 52
Discharge: HOME | End: 2022-07-15
Payer: COMMERCIAL

## 2022-07-15 PROCEDURE — 97110 THERAPEUTIC EXERCISES: CPT

## 2022-07-15 PROCEDURE — 97140 MANUAL THERAPY 1/> REGIONS: CPT

## 2022-07-15 NOTE — PROGRESS NOTES
PT DAILY TREATMENT NOTE 2-15    Patient Name: Gerhard Castillo  Date:7/15/2022  : 1970  [x]  Patient  Verified  Payor: Marita Lyman / Plan: Floyd Caba PPO / Product Type: PPO /    In time: 830  Out time: 926   Total Treatment Time (min): 56   Visit #:  3    Treatment Area: Other low back pain [M54.59]    SUBJECTIVE  Pain Level (0-10 scale): 5/10  Any medication changes, allergies to medications, adverse drug reactions, diagnosis change, or new procedure performed?: [x] No    [] Yes (see summary sheet for update)  Subjective functional status/changes:   [] No changes reported  Pt reported back pain is feeling more tight today. Has been working 10-12 hour days this week which she feels has contributed. Describes back pain as a \"pinch. \" Also reports difficulty with bending down to  heavy . OBJECTIVE         46 min Therapeutic Exercise:  [] See flow sheet :   Rationale: increase ROM and increase strength to improve the patients ability to perform functional daily tasks with minimal to no pain      10 min Manual Therapy:  STM/LPR left thoracic paraspinals, left UPA joint mobilizations T7-T10   Rationale: decrease pain, increase ROM and increase tissue extensibility  to improve the patients ability to perform functional daily tasks with minimal to no pain. With   [x] TE   [] TA   [] Neuro   [] SC   [] other: Patient Education: [x] Review HEP    [x] Progressed/Changed HEP based on:   [] positioning   [] body mechanics   [] transfers   [] heat/ice application    [] other:      Other Objective/Functional Measures:     Pain Level (0-10 scale) post treatment: 4/10    ASSESSMENT/Changes in Function:   Pt tolerated therapy session well today. Pt demonstrated glute weakness during clamshell exercise. Noted trunk rotation due to glute weakness, but improved with verbal and tactile cues. Pt reported difficulty and pain bending down to  objets off ground.  Discussed and educated pt on proper body mechanics for squatting. Pt performed with increased thoracolumbar flexion and minimal knee/hip flexion. Provided verbal and visual cues for proper technique and pt was able to improve appropriate muscle activation. Pt noted dec pain when performing squats with inc hip hinge compared to previous technique. Patient will continue to benefit from skilled PT services to modify and progress therapeutic interventions, address functional mobility deficits, address ROM deficits and address strength deficits to attain remaining goals. [x]  See Plan of Care  []  See progress note/recertification  []  See Discharge Summary         Progress towards goals / Updated goals:  Short Term Goals: To be accomplished in 6-8 treatments:               Patient will demonstrate understanding of and compliance with HEP showing full participation in physical therapy. Patient will demonstrate thoracolumbar rotation in both directions WNL without onset of symptoms toward improved mobility. Patient will improve 30 second sit to stand test to perform 12 showing improving functional mobility. Patient will demonstrate single limbe postural control >/= 10 seconds with eyes closed toward improved stability with prolonged walking tasks. Long Term Goals: To be accomplished in 12-16 treatments:               Patient will report minimal to no pain during yardwork to show improving functional mobility and participation in daily tasks. Patient will improve FOTO score by the [employer] 112 of 3 to >/= 61 showing significant improvement in their self-reported level of function.                 Patient will improve single limb heel raise test to completing 12 repetitions on each leg showing improved LE strength and functional mobility    PLAN  [x]  Upgrade activities as tolerated     [x]  Continue plan of care  [x]  Update interventions per flow sheet       []  Discharge due to:_  []  Other:_ [employer], PT 7/15/2022

## 2022-07-18 ENCOUNTER — APPOINTMENT (OUTPATIENT)
Dept: PHYSICAL THERAPY | Age: 52
End: 2022-07-18
Payer: COMMERCIAL

## 2022-07-29 ENCOUNTER — APPOINTMENT (OUTPATIENT)
Dept: PHYSICAL THERAPY | Age: 52
End: 2022-07-29
Payer: COMMERCIAL

## 2022-08-04 DIAGNOSIS — F90.9 ATTENTION DEFICIT HYPERACTIVITY DISORDER (ADHD), UNSPECIFIED ADHD TYPE: ICD-10-CM

## 2022-08-04 RX ORDER — METHYLPHENIDATE HYDROCHLORIDE 36 MG/1
36 TABLET ORAL
Qty: 30 TABLET | Refills: 0 | Status: SHIPPED | OUTPATIENT
Start: 2022-08-04 | End: 2022-09-08 | Stop reason: SDUPTHER

## 2022-08-04 NOTE — TELEPHONE ENCOUNTER
PCP: Clarence Valdez NP    Last appt: 6/8/2022  Future Appointments   Date Time Provider Richie David   10/11/2022 10:00 AM Clarence Valdez NP [facility] BS AMB       Requested Prescriptions     Pending Prescriptions Disp Refills    methylphenidate ER 36 mg 24 hr tab 30 Tablet 0     Sig: Take 1 Tablet by mouth every morning.  Max Daily Amount: 36 mg.       Prior labs and Blood pressures:  BP Readings from Last 3 Encounters:   06/08/22 128/74   12/07/21 128/72   11/24/21 (!) 131/95     Lab Results   Component Value Date/Time    Sodium 140 06/08/2022 10:17 AM    Potassium 3.7 06/08/2022 10:17 AM    Chloride 105 06/08/2022 10:17 AM    CO2 29 06/08/2022 10:17 AM    Anion gap 6 06/08/2022 10:17 AM    Glucose 90 06/08/2022 10:17 AM    BUN 9 06/08/2022 10:17 AM    Creatinine 0.81 06/08/2022 10:17 AM    BUN/Creatinine ratio 11 (L) 06/08/2022 10:17 AM    GFR est AA >60 06/08/2022 10:17 AM    GFR est non-AA >60 06/08/2022 10:17 AM    Calcium 9.7 06/08/2022 10:17 AM     Lab Results   Component Value Date/Time    Hemoglobin A1c 5.0 12/07/2021 02:33 PM    Hemoglobin A1c (POC) 5.1 05/17/2018 10:39 AM     Lab Results   Component Value Date/Time    Cholesterol, total 178 06/08/2022 10:17 AM    Cholesterol (POC) 206.0 (A) 05/17/2018 10:39 AM    HDL Cholesterol 46 06/08/2022 10:17 AM    HDL Cholesterol (POC) 48.0 05/17/2018 10:39 AM    LDL Cholesterol (POC) 109.0 05/17/2018 10:39 AM    LDL, calculated 94.6 06/08/2022 10:17 AM    VLDL, calculated 37.4 06/08/2022 10:17 AM    Triglyceride 187 (H) 06/08/2022 10:17 AM    Triglycerides (POC) 245.0 (A) 05/17/2018 10:39 AM    CHOL/HDL Ratio 3.9 06/08/2022 10:17 AM     Lab Results   Component Value Date/Time    Vitamin D 25-Hydroxy 39.8 12/07/2021 02:33 PM       Lab Results   Component Value Date/Time    TSH 1.01 12/07/2021 02:33 PM    TSH, 3rd generation 0.83 01/19/2021 08:44 AM

## 2022-08-29 RX ORDER — ATORVASTATIN CALCIUM 20 MG/1
TABLET, FILM COATED ORAL
Qty: 90 TABLET | Refills: 1 | Status: SHIPPED | OUTPATIENT
Start: 2022-08-29

## 2022-09-08 DIAGNOSIS — F90.9 ATTENTION DEFICIT HYPERACTIVITY DISORDER (ADHD), UNSPECIFIED ADHD TYPE: ICD-10-CM

## 2022-09-08 RX ORDER — METHYLPHENIDATE HYDROCHLORIDE 36 MG/1
36 TABLET ORAL
Qty: 30 TABLET | Refills: 0 | Status: SHIPPED | OUTPATIENT
Start: 2022-09-08 | End: 2022-10-04 | Stop reason: SDUPTHER

## 2022-09-08 NOTE — TELEPHONE ENCOUNTER
PCP: Helen Javed NP    Last appt: 6/8/2022  Future Appointments   Date Time Provider Richie David   10/11/2022 10:00 AM Helen Javed NP [facility] AMB       Requested Prescriptions     Pending Prescriptions Disp Refills    methylphenidate ER 36 mg 24 hr tab 30 Tablet 0     Sig: Take 1 Tablet by mouth every morning.  Max Daily Amount: 36 mg.       Prior labs and Blood pressures:  BP Readings from Last 3 Encounters:   06/08/22 128/74   12/07/21 128/72   11/24/21 (!) 131/95     Lab Results   Component Value Date/Time    Sodium 140 06/08/2022 10:17 AM    Potassium 3.7 06/08/2022 10:17 AM    Chloride 105 06/08/2022 10:17 AM    CO2 29 06/08/2022 10:17 AM    Anion gap 6 06/08/2022 10:17 AM    Glucose 90 06/08/2022 10:17 AM    BUN 9 06/08/2022 10:17 AM    Creatinine 0.81 06/08/2022 10:17 AM    BUN/Creatinine ratio 11 (L) 06/08/2022 10:17 AM    GFR est AA >60 06/08/2022 10:17 AM    GFR est non-AA >60 06/08/2022 10:17 AM    Calcium 9.7 06/08/2022 10:17 AM     Lab Results   Component Value Date/Time    Hemoglobin A1c 5.0 12/07/2021 02:33 PM    Hemoglobin A1c (POC) 5.1 05/17/2018 10:39 AM     Lab Results   Component Value Date/Time    Cholesterol, total 178 06/08/2022 10:17 AM    Cholesterol (POC) 206.0 (A) 05/17/2018 10:39 AM    HDL Cholesterol 46 06/08/2022 10:17 AM    HDL Cholesterol (POC) 48.0 05/17/2018 10:39 AM    LDL Cholesterol (POC) 109.0 05/17/2018 10:39 AM    LDL, calculated 94.6 06/08/2022 10:17 AM    VLDL, calculated 37.4 06/08/2022 10:17 AM    Triglyceride 187 (H) 06/08/2022 10:17 AM    Triglycerides (POC) 245.0 (A) 05/17/2018 10:39 AM    CHOL/HDL Ratio 3.9 06/08/2022 10:17 AM     Lab Results   Component Value Date/Time    Vitamin D 25-Hydroxy 39.8 12/07/2021 02:33 PM       Lab Results   Component Value Date/Time    TSH 1.01 12/07/2021 02:33 PM    TSH, 3rd generation 0.83 01/19/2021 08:44 AM

## 2022-09-26 DIAGNOSIS — G47.00 INSOMNIA, UNSPECIFIED TYPE: ICD-10-CM

## 2022-09-27 RX ORDER — CLONAZEPAM 0.5 MG/1
TABLET ORAL
Qty: 90 TABLET | Refills: 0 | Status: SHIPPED | OUTPATIENT
Start: 2022-09-27

## 2022-10-04 DIAGNOSIS — F90.9 ATTENTION DEFICIT HYPERACTIVITY DISORDER (ADHD), UNSPECIFIED ADHD TYPE: ICD-10-CM

## 2022-10-05 RX ORDER — METHYLPHENIDATE HYDROCHLORIDE 36 MG/1
36 TABLET ORAL
Qty: 30 TABLET | Refills: 0 | Status: SHIPPED | OUTPATIENT
Start: 2022-10-05 | End: 2022-11-03 | Stop reason: SDUPTHER

## 2022-10-05 NOTE — TELEPHONE ENCOUNTER
PCP: Nafisa Lindsey NP    Last appt: 6/8/2022  Future Appointments   Date Time Provider Richie David   10/11/2022 10:00 AM Nafisa Lindsey NP [facility] AMB       Requested Prescriptions     Pending Prescriptions Disp Refills    methylphenidate ER 36 mg 24 hr tab 30 Tablet 0     Sig: Take 1 Tablet by mouth every morning.  Max Daily Amount: 36 mg.       Prior labs and Blood pressures:  BP Readings from Last 3 Encounters:   06/08/22 128/74   12/07/21 128/72   11/24/21 (!) 131/95     Lab Results   Component Value Date/Time    Sodium 140 06/08/2022 10:17 AM    Potassium 3.7 06/08/2022 10:17 AM    Chloride 105 06/08/2022 10:17 AM    CO2 29 06/08/2022 10:17 AM    Anion gap 6 06/08/2022 10:17 AM    Glucose 90 06/08/2022 10:17 AM    BUN 9 06/08/2022 10:17 AM    Creatinine 0.81 06/08/2022 10:17 AM    BUN/Creatinine ratio 11 (L) 06/08/2022 10:17 AM    GFR est AA >60 06/08/2022 10:17 AM    GFR est non-AA >60 06/08/2022 10:17 AM    Calcium 9.7 06/08/2022 10:17 AM     Lab Results   Component Value Date/Time    Hemoglobin A1c 5.0 12/07/2021 02:33 PM    Hemoglobin A1c (POC) 5.1 05/17/2018 10:39 AM     Lab Results   Component Value Date/Time    Cholesterol, total 178 06/08/2022 10:17 AM    Cholesterol (POC) 206.0 (A) 05/17/2018 10:39 AM    HDL Cholesterol 46 06/08/2022 10:17 AM    HDL Cholesterol (POC) 48.0 05/17/2018 10:39 AM    LDL Cholesterol (POC) 109.0 05/17/2018 10:39 AM    LDL, calculated 94.6 06/08/2022 10:17 AM    VLDL, calculated 37.4 06/08/2022 10:17 AM    Triglyceride 187 (H) 06/08/2022 10:17 AM    Triglycerides (POC) 245.0 (A) 05/17/2018 10:39 AM    CHOL/HDL Ratio 3.9 06/08/2022 10:17 AM     Lab Results   Component Value Date/Time    Vitamin D 25-Hydroxy 39.8 12/07/2021 02:33 PM       Lab Results   Component Value Date/Time    TSH 1.01 12/07/2021 02:33 PM    TSH, 3rd generation 0.83 01/19/2021 08:44 AM

## 2022-10-08 DIAGNOSIS — G89.29 CHRONIC LEFT-SIDED THORACIC BACK PAIN: ICD-10-CM

## 2022-10-08 DIAGNOSIS — M54.6 CHRONIC LEFT-SIDED THORACIC BACK PAIN: ICD-10-CM

## 2022-10-10 RX ORDER — ETODOLAC 400 MG/1
TABLET, FILM COATED ORAL
Qty: 60 TABLET | Refills: 3 | Status: SHIPPED | OUTPATIENT
Start: 2022-10-10 | End: 2022-10-11

## 2022-10-11 ENCOUNTER — OFFICE VISIT (OUTPATIENT)
Dept: INTERNAL MEDICINE CLINIC | Age: 52
End: 2022-10-11
Payer: COMMERCIAL

## 2022-10-11 VITALS
OXYGEN SATURATION: 99 % | DIASTOLIC BLOOD PRESSURE: 74 MMHG | RESPIRATION RATE: 16 BRPM | SYSTOLIC BLOOD PRESSURE: 132 MMHG | HEART RATE: 81 BPM | WEIGHT: 244.2 LBS | HEIGHT: 62 IN | TEMPERATURE: 97.9 F | BODY MASS INDEX: 44.94 KG/M2

## 2022-10-11 DIAGNOSIS — M62.830 BACK MUSCLE SPASM: ICD-10-CM

## 2022-10-11 DIAGNOSIS — Z79.899 ON STATIN THERAPY: ICD-10-CM

## 2022-10-11 DIAGNOSIS — Z79.899 LONG-TERM CURRENT USE OF BENZODIAZEPINE: ICD-10-CM

## 2022-10-11 DIAGNOSIS — Z79.899 LONG-TERM CURRENT USE OF STIMULANT: ICD-10-CM

## 2022-10-11 DIAGNOSIS — I10 ESSENTIAL HYPERTENSION: ICD-10-CM

## 2022-10-11 DIAGNOSIS — E78.2 MIXED HYPERLIPIDEMIA: ICD-10-CM

## 2022-10-11 DIAGNOSIS — F90.0 ATTENTION DEFICIT HYPERACTIVITY DISORDER (ADHD), PREDOMINANTLY INATTENTIVE TYPE: Primary | ICD-10-CM

## 2022-10-11 DIAGNOSIS — Z79.899 CONTROLLED SUBSTANCE AGREEMENT SIGNED: ICD-10-CM

## 2022-10-11 DIAGNOSIS — E66.01 CLASS 3 SEVERE OBESITY DUE TO EXCESS CALORIES WITH SERIOUS COMORBIDITY AND BODY MASS INDEX (BMI) OF 40.0 TO 44.9 IN ADULT (HCC): ICD-10-CM

## 2022-10-11 PROCEDURE — 99214 OFFICE O/P EST MOD 30 MIN: CPT | Performed by: NURSE PRACTITIONER

## 2022-10-11 RX ORDER — ALBUTEROL SULFATE 90 UG/1
2 AEROSOL, METERED RESPIRATORY (INHALATION)
Qty: 18 EACH | Refills: 6 | Status: SHIPPED | OUTPATIENT
Start: 2022-10-11

## 2022-10-11 RX ORDER — METHOCARBAMOL 500 MG/1
500 TABLET, FILM COATED ORAL
Qty: 90 TABLET | Refills: 1 | Status: SHIPPED | OUTPATIENT
Start: 2022-10-11
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
## 2022-10-11 NOTE — PROGRESS NOTES
Rosalba Ledesma is a 46 y.o. female     Chief Complaint   Patient presents with    Hypertension     4M follow up       Visit Vitals  /74 (BP 1 Location: Left arm, BP Patient Position: Sitting, BP Cuff Size: Adult)   Pulse 81   Temp 97.9 °F (36.6 °C) (Oral)   Resp 16   Ht 5' 2\" (1.575 m)   Wt 244 lb 3.2 oz (110.8 kg)   SpO2 99%   BMI 44.66 kg/m²       Health Maintenance Due   Topic Date Due    Cervical cancer screen  Never done    Colorectal Cancer Screening Combo  Never done    Shingrix Vaccine Age 50> (1 of 2) Never done    Low dose CT lung screening  Never done    Breast Cancer Screen Mammogram  Never done    COVID-19 Vaccine (4 - Booster for Pfizer series) 04/10/2022    Flu Vaccine (1) 08/01/2022         1. \"Have you been to the ER, urgent care clinic since your last visit? Hospitalized since your last visit? \" No    2. \"Have you seen or consulted any other health care providers outside of the [address] [place] [address] since your last visit? \" No     3. For patients aged 39-70: Has the patient had a colonoscopy / FIT/ Cologuard? No      If the patient is female:    4. For patients aged 41-77: Has the patient had a mammogram within the past 2 years? No      5. For patients aged 21-65: Has the patient had a pap smear?  No

## 2022-10-18 NOTE — PROGRESS NOTES
[facility] 76. Physical Therapy  [address] ([address]), [address] [facility]  Phone: [phone] Fax: [phone]    Discharge Summary  2-15    Patient name: Maya Taylor  : 1970  Provider#: [id number]  Referral source: Beka Bernard NP      Medical/Treatment Diagnosis: Other low back pain [M54.59]     Prior Hospitalization: see medical history     Comorbidities: See Plan of Care  Prior Level of Function:See Plan of Care  Medications: Verified on Patient Summary List    Start of Care: 2022      Onset Date:2021   Visits from Start of Care: 3     Missed Visits: 3  Reporting Period : 2022 to 2022      ASSESSMENT/SUMMARY OF CARE: Pt is discharged today, 10/18/2022, as they have stopped attending therapy. Final objective data and outcomes were unable to be obtained.          RECOMMENDATIONS:  [x]Discontinue therapy: []Patient has reached or is progressing toward set goals      [x]Patient is non-compliant or has abdicated      []Due to lack of appreciable progress towards set goals      []Other    [employer], PT 10/18/2022

## 2022-11-03 DIAGNOSIS — F90.9 ATTENTION DEFICIT HYPERACTIVITY DISORDER (ADHD), UNSPECIFIED ADHD TYPE: ICD-10-CM

## 2022-11-03 RX ORDER — METHYLPHENIDATE HYDROCHLORIDE 36 MG/1
36 TABLET ORAL
Qty: 30 TABLET | Refills: 0 | Status: SHIPPED | OUTPATIENT
Start: 2022-11-03

## 2022-11-03 NOTE — TELEPHONE ENCOUNTER
PCP: Tamiko Rodriguez NP    Last appt: 10/11/2022  Future Appointments   Date Time Provider Richie David   2/13/2023  8:00 AM Tamiko Rodriguez NP [facility] BS AMB       Requested Prescriptions     Pending Prescriptions Disp Refills    methylphenidate ER 36 mg 24 hr tab 30 Tablet 0     Sig: Take 1 Tablet by mouth every morning.  Max Daily Amount: 36 mg.         Other Comments:

## 2022-11-07 DIAGNOSIS — J30.89 ENVIRONMENTAL AND SEASONAL ALLERGIES: Primary | ICD-10-CM

## 2022-11-07 RX ORDER — AZELASTINE HYDROCHLORIDE, FLUTICASONE PROPIONATE 137; 50 UG/1; UG/1
SPRAY, METERED NASAL
Qty: 23 G | Refills: 6 | Status: SHIPPED | OUTPATIENT
Start: 2022-11-07

## 2022-12-02 DIAGNOSIS — F90.9 ATTENTION DEFICIT HYPERACTIVITY DISORDER (ADHD), UNSPECIFIED ADHD TYPE: ICD-10-CM

## 2022-12-02 RX ORDER — METHYLPHENIDATE HYDROCHLORIDE 36 MG/1
36 TABLET ORAL
Qty: 30 TABLET | Refills: 0 | Status: SHIPPED | OUTPATIENT
Start: 2022-12-02

## 2022-12-02 NOTE — TELEPHONE ENCOUNTER
PCP: Rosalba Gore NP    Last appt: 10/11/2022  Future Appointments   Date Time Provider Richie David   2/8/2023  8:00 AM Rosalba Gore NP [facility] BS AMB       Requested Prescriptions     Pending Prescriptions Disp Refills    methylphenidate ER 36 mg 24 hr tab 30 Tablet 0     Sig: Take 1 Tablet by mouth every morning.  Max Daily Amount: 36 mg.       Prior labs and Blood pressures:  BP Readings from Last 3 Encounters:   10/11/22 132/74   06/08/22 128/74   12/07/21 128/72     Lab Results   Component Value Date/Time    Sodium 140 06/08/2022 10:17 AM    Potassium 3.7 06/08/2022 10:17 AM    Chloride 105 06/08/2022 10:17 AM    CO2 29 06/08/2022 10:17 AM    Anion gap 6 06/08/2022 10:17 AM    Glucose 90 06/08/2022 10:17 AM    BUN 9 06/08/2022 10:17 AM    Creatinine 0.81 06/08/2022 10:17 AM    BUN/Creatinine ratio 11 (L) 06/08/2022 10:17 AM    GFR est AA >60 06/08/2022 10:17 AM    GFR est non-AA >60 06/08/2022 10:17 AM    Calcium 9.7 06/08/2022 10:17 AM

## 2022-12-10 DIAGNOSIS — M62.830 BACK MUSCLE SPASM: ICD-10-CM

## 2022-12-12 RX ORDER — METHOCARBAMOL 500 MG/1
TABLET, FILM COATED ORAL
Qty: 90 TABLET | Refills: 1 | Status: SHIPPED | OUTPATIENT
Start: 2022-12-12

## 2022-12-27 DIAGNOSIS — G47.00 INSOMNIA, UNSPECIFIED TYPE: ICD-10-CM

## 2022-12-29 RX ORDER — CLONAZEPAM 0.5 MG/1
TABLET ORAL
Qty: 90 TABLET | Refills: 0 | Status: SHIPPED | OUTPATIENT
Start: 2022-12-29

## 2023-01-02 DIAGNOSIS — F90.9 ATTENTION DEFICIT HYPERACTIVITY DISORDER (ADHD), UNSPECIFIED ADHD TYPE: ICD-10-CM

## 2023-01-03 RX ORDER — METHYLPHENIDATE HYDROCHLORIDE 36 MG/1
36 TABLET ORAL
Qty: 30 TABLET | Refills: 0 | Status: SHIPPED | OUTPATIENT
Start: 2023-01-03

## 2023-01-03 NOTE — TELEPHONE ENCOUNTER
PCP: Glendy Evans NP    Last appt: 10/11/2022  Future Appointments   Date Time Provider Richie David   2/8/2023  8:00 AM Glendy Evans NP [facility] BS AMB       Requested Prescriptions     Pending Prescriptions Disp Refills    methylphenidate ER 36 mg 24 hr tab 30 Tablet 0     Sig: Take 1 Tablet by mouth every morning.  Max Daily Amount: 36 mg.       Prior labs and Blood pressures:  BP Readings from Last 3 Encounters:   10/11/22 132/74   06/08/22 128/74   12/07/21 128/72     Lab Results   Component Value Date/Time    Sodium 140 06/08/2022 10:17 AM    Potassium 3.7 06/08/2022 10:17 AM    Chloride 105 06/08/2022 10:17 AM    CO2 29 06/08/2022 10:17 AM    Anion gap 6 06/08/2022 10:17 AM    Glucose 90 06/08/2022 10:17 AM    BUN 9 06/08/2022 10:17 AM    Creatinine 0.81 06/08/2022 10:17 AM    BUN/Creatinine ratio 11 (L) 06/08/2022 10:17 AM    GFR est AA >60 06/08/2022 10:17 AM    GFR est non-AA >60 06/08/2022 10:17 AM    Calcium 9.7 06/08/2022 10:17 AM     Lab Results   Component Value Date/Time    Hemoglobin A1c 5.0 12/07/2021 02:33 PM    Hemoglobin A1c (POC) 5.1 05/17/2018 10:39 AM     Lab Results   Component Value Date/Time    Cholesterol, total 178 06/08/2022 10:17 AM    Cholesterol (POC) 206.0 (A) 05/17/2018 10:39 AM    HDL Cholesterol 46 06/08/2022 10:17 AM    HDL Cholesterol (POC) 48.0 05/17/2018 10:39 AM    LDL Cholesterol (POC) 109.0 05/17/2018 10:39 AM    LDL, calculated 94.6 06/08/2022 10:17 AM    VLDL, calculated 37.4 06/08/2022 10:17 AM    Triglyceride 187 (H) 06/08/2022 10:17 AM    Triglycerides (POC) 245.0 (A) 05/17/2018 10:39 AM    CHOL/HDL Ratio 3.9 06/08/2022 10:17 AM     Lab Results   Component Value Date/Time    Vitamin D 25-Hydroxy 39.8 12/07/2021 02:33 PM       Lab Results   Component Value Date/Time    TSH 1.01 12/07/2021 02:33 PM    TSH, 3rd generation 0.83 01/19/2021 08:44 AM

## 2023-02-02 DIAGNOSIS — F90.9 ATTENTION DEFICIT HYPERACTIVITY DISORDER (ADHD), UNSPECIFIED ADHD TYPE: ICD-10-CM

## 2023-02-02 RX ORDER — METHYLPHENIDATE HYDROCHLORIDE 36 MG/1
36 TABLET ORAL
Qty: 30 TABLET | Refills: 0 | Status: SHIPPED | OUTPATIENT
Start: 2023-02-02

## 2023-02-02 NOTE — TELEPHONE ENCOUNTER
PCP: Nichole Landry NP    Last appt: 10/11/2022  Future Appointments   Date Time Provider Richie David   2/8/2023  8:00 AM Nichole Landry NP [facility] BS AMB       Requested Prescriptions     Pending Prescriptions Disp Refills    methylphenidate ER 36 mg 24 hr tab 30 Tablet 0     Sig: Take 1 Tablet by mouth every morning.  Max Daily Amount: 36 mg.       Prior labs and Blood pressures:  BP Readings from Last 3 Encounters:   10/11/22 132/74   06/08/22 128/74   12/07/21 128/72     Lab Results   Component Value Date/Time    Sodium 140 06/08/2022 10:17 AM    Potassium 3.7 06/08/2022 10:17 AM    Chloride 105 06/08/2022 10:17 AM    CO2 29 06/08/2022 10:17 AM    Anion gap 6 06/08/2022 10:17 AM    Glucose 90 06/08/2022 10:17 AM    BUN 9 06/08/2022 10:17 AM    Creatinine 0.81 06/08/2022 10:17 AM    BUN/Creatinine ratio 11 (L) 06/08/2022 10:17 AM    GFR est AA >60 06/08/2022 10:17 AM    GFR est non-AA >60 06/08/2022 10:17 AM    Calcium 9.7 06/08/2022 10:17 AM     Lab Results   Component Value Date/Time    Hemoglobin A1c 5.0 12/07/2021 02:33 PM    Hemoglobin A1c (POC) 5.1 05/17/2018 10:39 AM     Lab Results   Component Value Date/Time    Cholesterol, total 178 06/08/2022 10:17 AM    Cholesterol (POC) 206.0 (A) 05/17/2018 10:39 AM    HDL Cholesterol 46 06/08/2022 10:17 AM    HDL Cholesterol (POC) 48.0 05/17/2018 10:39 AM    LDL Cholesterol (POC) 109.0 05/17/2018 10:39 AM    LDL, calculated 94.6 06/08/2022 10:17 AM    VLDL, calculated 37.4 06/08/2022 10:17 AM    Triglyceride 187 (H) 06/08/2022 10:17 AM    Triglycerides (POC) 245.0 (A) 05/17/2018 10:39 AM    CHOL/HDL Ratio 3.9 06/08/2022 10:17 AM     Lab Results   Component Value Date/Time    Vitamin D 25-Hydroxy 39.8 12/07/2021 02:33 PM       Lab Results   Component Value Date/Time    TSH 1.01 12/07/2021 02:33 PM    TSH, 3rd generation 0.83 01/19/2021 08:44 AM

## 2023-02-08 ENCOUNTER — OFFICE VISIT (OUTPATIENT)
Dept: INTERNAL MEDICINE CLINIC | Age: 53
End: 2023-02-08
Payer: COMMERCIAL

## 2023-02-08 VITALS
DIASTOLIC BLOOD PRESSURE: 76 MMHG | WEIGHT: 237.8 LBS | HEART RATE: 73 BPM | TEMPERATURE: 98.1 F | OXYGEN SATURATION: 99 % | BODY MASS INDEX: 43.76 KG/M2 | RESPIRATION RATE: 16 BRPM | HEIGHT: 62 IN | SYSTOLIC BLOOD PRESSURE: 128 MMHG

## 2023-02-08 DIAGNOSIS — E66.01 CLASS 3 SEVERE OBESITY DUE TO EXCESS CALORIES WITH SERIOUS COMORBIDITY AND BODY MASS INDEX (BMI) OF 40.0 TO 44.9 IN ADULT (HCC): ICD-10-CM

## 2023-02-08 DIAGNOSIS — E78.2 MIXED HYPERLIPIDEMIA: ICD-10-CM

## 2023-02-08 DIAGNOSIS — Z00.00 ROUTINE PHYSICAL EXAMINATION: Primary | ICD-10-CM

## 2023-02-08 DIAGNOSIS — J32.9 RHINOSINUSITIS: ICD-10-CM

## 2023-02-08 DIAGNOSIS — Z79.899 CONTROLLED SUBSTANCE AGREEMENT SIGNED: ICD-10-CM

## 2023-02-08 DIAGNOSIS — J31.0 RHINOSINUSITIS: ICD-10-CM

## 2023-02-08 DIAGNOSIS — Z79.899 LONG-TERM CURRENT USE OF STIMULANT: ICD-10-CM

## 2023-02-08 DIAGNOSIS — Z79.899 LONG-TERM CURRENT USE OF BENZODIAZEPINE: ICD-10-CM

## 2023-02-08 DIAGNOSIS — F17.200 TOBACCO DEPENDENCE: ICD-10-CM

## 2023-02-08 DIAGNOSIS — Z79.899 ON STATIN THERAPY: ICD-10-CM

## 2023-02-08 DIAGNOSIS — I10 ESSENTIAL HYPERTENSION: ICD-10-CM

## 2023-02-08 DIAGNOSIS — F90.0 ATTENTION DEFICIT HYPERACTIVITY DISORDER (ADHD), PREDOMINANTLY INATTENTIVE TYPE: ICD-10-CM

## 2023-02-08 PROCEDURE — 99214 OFFICE O/P EST MOD 30 MIN: CPT | Performed by: NURSE PRACTITIONER

## 2023-02-08 PROCEDURE — 3074F SYST BP LT 130 MM HG: CPT | Performed by: NURSE PRACTITIONER

## 2023-02-08 PROCEDURE — 3078F DIAST BP <80 MM HG: CPT | Performed by: NURSE PRACTITIONER

## 2023-02-08 RX ORDER — METHYLPREDNISOLONE 4 MG/1
TABLET ORAL
Qty: 1 DOSE PACK | Refills: 0 | Status: SHIPPED | OUTPATIENT
Start: 2023-02-08

## 2023-02-08 NOTE — PROGRESS NOTES
Chief Complaint   Patient presents with    Cholesterol Problem     4M       SUBJECTIVE:    León Brody is a 46 y.o. female who is here today for a routine physical examination as well as follow-up regarding current medical conditions including: Essential hypertension, mixed hyperlipidemia, severe obesity, ADHD predominantly inattentive type, anxiety, long-term use of stimulant and benzodiazepine, and long-term use of statin therapy. She also complains of severe nasal congestion and facial pressure over the past couple of weeks. Patient is currently taking medication for management of her hypertension and tolerating this well. Her blood pressure appears stable and well controlled at this time. She is also on atorvastatin daily for management of her cholesterol. She denies any adverse side effects. Additionally, she denies any recent episodes of chest pain, chest pressure, shortness of breath, headaches, dizziness, blurred vision, palpitations, or syncope episodes. She continues to smoke despite health risks and warnings. She has no plans to quit at this time. In addition, the patient is currently being managed for ADHD predominantly inattentive type is previously diagnosed via DSM-V TR manual.  She has met criteria for the diagnosis previously, and has been well-established for some time now. She is currently on Concerta ER 36 mg daily which has been helpful for her. She states the medication helps to improve her focus, decrease her distractibility, and she is able to complete tasks in a more timely manner. She denies any abuse or misuse of the medication. The patient also suffers from anxiety and insomnia, and is currently on clonazepam at bedtime for this. She states the medication is effective and helpful. Additionally, she presents with facial pressure and sinus pain which she contributes to allergens.   She is currently on Dymista as well as Zyrtec daily as prescribed, but states she is having difficulty breathing through her nose well, especially to the left side. She denies any epistaxis, discolored discharge, or fever. Current Outpatient Medications   Medication Sig Dispense Refill    methylphenidate ER 36 mg 24 hr tab Take 1 Tablet by mouth every morning. Max Daily Amount: 36 mg. 30 Tablet 0    clonazePAM (KlonoPIN) 0.5 mg tablet TAKE 1 TABLET BY MOUTH EVERY DAY AT BEDTIME AS NEEDED FOR ANXIETY 90 Tablet 0    methocarbamoL (ROBAXIN) 500 mg tablet TAKE 1 TABLET BY MOUTH THREE TIMES A DAY AS NEEDED FOR MUSCLE SPASMS 90 Tablet 1    azelastine-fluticasone (DYMISTA) 137-50 mcg/spray PLACE 1 SPRAY INTO EACH NOSTRIL EVERY DAY 23 g 6    albuterol (PROVENTIL HFA, VENTOLIN HFA, PROAIR HFA) 90 mcg/actuation inhaler Take 2 Puffs by inhalation every four (4) hours as needed for Wheezing. 18 Each 6    atorvastatin (LIPITOR) 20 mg tablet TAKE 1 TABLET BY MOUTH EVERY DAY 90 Tablet 1    primidone (MYSOLINE) 50 mg tablet Take 1 Tablet by mouth daily. 90 Tablet 3    lisinopril-hydroCHLOROthiazide (PRINZIDE, ZESTORETIC) 10-12.5 mg per tablet Take 1 Tablet by mouth daily. 90 Tablet 3    triamcinolone (ARISTOCORT) 0.5 % topical cream Apply  to affected area two (2) times a day. use thin layer 15 g 0    olopatadine (PATANOL) 0.1 % ophthalmic solution Administer 2 Drops to both eyes two (2) times a day. 15 mL 6    cholecalciferol (VITAMIN D3) (2,000 UNITS /50 MCG) cap capsule Take  by mouth two (2) times a day. cetirizine (ZYRTEC) 10 mg tablet Take  by mouth.      multivitamin (ONE A DAY) tablet Take 1 Tab by mouth daily. Indications: Flinstone chewable      omeprazole (PRILOSEC) 40 mg capsule Take 40 mg by mouth daily.        Past Medical History:   Diagnosis Date    Hypertension     Ill-defined condition     PCOS     Past Surgical History:   Procedure Laterality Date    HX GYN      cyst removal    HX ORTHOPAEDIC      L Knee    HX ROTATOR CUFF REPAIR      right    HX TONSILLECTOMY Allergies   Allergen Reactions    Pcn [Penicillins] Anaphylaxis    Zithromax [Azithromycin] Anaphylaxis       REVIEW OF SYSTEMS:                                        POSITIVE= bold text  Negative = regular text    General:                     fever, chills, sweats, generalized weakness, weight loss/gain,                                       loss of appetite   Eyes:                           blurred vision, eye pain, loss of vision, double vision  ENT:                            rhinorrhea, pharyngitis   Respiratory:               cough, sputum production, SOB, MCLEAN, wheezing, pleuritic pain   Cardiology:                chest pain, palpitations, orthopnea, PND, edema, syncope   Gastrointestinal:       abdominal pain , N/V, diarrhea, dysphagia, constipation, bleeding   Genitourinary:           frequency, urgency, dysuria, hematuria, incontinence   Muskuloskeletal :      arthralgia, myalgia, back pain  Hematology:              easy bruising, nose or gum bleeding, lymphadenopathy   Dermatological:         rash, ulceration, pruritis, color change / jaundice  Endocrine:                 hot flashes or polydipsia   Neurological:             headache, dizziness, confusion, focal weakness, paresthesia,                                      Speech difficulties, memory loss, gait difficulty  Psychological:          Feelings of anxiety, depression, agitation        Social History     Socioeconomic History    Marital status: SINGLE   Occupational History    Occupation: IT   Tobacco Use    Smoking status: Every Day     Packs/day: 1.00     Years: 30.00     Pack years: 30.00     Types: Cigarettes    Smokeless tobacco: Never   Vaping Use    Vaping Use: Never used   Substance and Sexual Activity    Alcohol use: Yes     Comment: occasionally    Drug use: No    Sexual activity: Not Currently     Social Determinants of Health     Financial Resource Strain: Low Risk     Difficulty of Paying Living Expenses: Not hard at all   Food Insecurity: No Food Insecurity    Worried About Running Out of Food in the Last Year: Never true    Ran Out of Food in the Last Year: Never true     Family History   Problem Relation Age of Onset    Tremors Sister        OBJECTIVE:     Visit Vitals  /76 (BP 1 Location: Left upper arm, BP Patient Position: Sitting, BP Cuff Size: Adult)   Pulse 73   Temp 98.1 °F (36.7 °C) (Oral)   Resp 16   Ht 5' 2\" (1.575 m)   Wt 237 lb 12.8 oz (107.9 kg)   SpO2 99%   BMI 43.49 kg/m²       Constitutional: She appears well nourished, of stated age, and dressed appropriately. Eyes: Sclera anicteric, PERRLA, EOMI  ENT: Nares clear, pale boggy turbinates bilaterally with thick clear exudate. Cobblestoned oropharynx with postnasal drip present. Neck: Supple without lymphadenopathy. Thyroid normal, No JVD or bruits  Respiratory: Clear to ascultation X5, normal inspiratory effort, no adventitious breath sounds. Cardiovascular: Regular rate and rhythm, no murmurs, rubs or gallops, PMI not displaced, No thrills, no peripheral edema  Gastrointestinal: Abdomen non-distended, soft, non-tender, bowel sounds normal and active X4. Hematologic: No purpura, petechiae or unexplained bruising  Lymphatic: No lymph node enlargemant. Integumentary: No unusual rashes or suspicious skin lesions noted. Neuro: Non-focal exam, A & O X 3.   Psychiatric: Appropriate affect and demeanor, pleasant and cooperative. Patient's thought content and thought processing appear to be within normal limits. ASSESSMENT/PLAN:     ICD-10-CM ICD-9-CM    1. Routine physical examination  Z00.00 V70.0 CBC W/O DIFF      METABOLIC PANEL, COMPREHENSIVE      CK      LIPID PANEL      TSH 3RD GENERATION      URINALYSIS W/ RFLX MICROSCOPIC      URINALYSIS W/ RFLX MICROSCOPIC      TSH 3RD GENERATION      LIPID PANEL      CK      METABOLIC PANEL, COMPREHENSIVE      CBC W/O DIFF      2.  Essential hypertension  I10 401.9 CBC W/O DIFF      METABOLIC PANEL, COMPREHENSIVE METABOLIC PANEL, COMPREHENSIVE      CBC W/O DIFF      3. Mixed hyperlipidemia  E78.2 272.2 LIPID PANEL      LIPID PANEL      4. Class 3 severe obesity due to excess calories with serious comorbidity and body mass index (BMI) of 40.0 to 44.9 in adult ([facility]  E66.01 278.01     Z68.41 V85.41       5. Attention deficit hyperactivity disorder (ADHD), predominantly inattentive type  F90.0 314.00       6. Tobacco dependence  F17.200 305.1       7. Long-term current use of stimulant  Z79.899 V58.69 TOXASSURE SELECT 13 (MW)      TOXASSURE SELECT 13 (MW)      8. On statin therapy  Z79.899 V58.69 CK      CK      9. Long-term current use of benzodiazepine  Z79.899 V58.69 TOXASSURE SELECT 13 (MW)      Dakota Rout 13 (MW)      10. Controlled substance agreement signed  Z79.899 V58.69       11. Rhinosinusitis  J31.0 473.9 methylPREDNISolone (MEDROL DOSEPACK) 4 mg tablet    J32.9          1: Labs ordered today include: CBC, CMP, lipid panel, CK, TSH, urinalysis, and tox assure urine drug screen. 2: Patient to continue current medication for management of hypertension. Blood pressure appears stable and well controlled at this time. 3: Patient to continue atorvastatin daily for management of mixed hyperlipidemia. Patient tolerating well.  4: Patient will be given a steroid pack due to rhinosinusitis. Continue Dymista nasal spray, Zyrtec, and may use saline washes as well. 5: Continue methylphenidate ER 36 mg daily for management of ADHD. Patient's symptoms well controlled at this time. No issues or concerns present. 6: Patient to continue clonazepam nightly for management of anxiety/insomnia. This appears to be working well and her symptoms are stable. 7: Continue all other medications as prescribed. 8: Patient to work on healthy lifestyle management including: Cessation of smoking, low-fat/low-cholesterol diet, adequate amounts of fiber and water, and regular exercise patterns for weight loss.   9: Patient to follow-up with me in approximately 4 months, or sooner as needed. Patient states understanding and agrees with plan. Orders Placed This Encounter    TOXASSURE SELECT 13 (MW)    CBC W/O DIFF    METABOLIC PANEL, COMPREHENSIVE    CK    LIPID PANEL    TSH 3RD GENERATION    URINALYSIS W/ RFLX MICROSCOPIC         ATTENTION:   This medical record was transcribed using an electronic medical records system. Although proofread, it may and can contain electronic and spelling errors. Other human spelling and other errors may be present. Corrections may be executed at a later time. Please feel free to contact us for any clarifications as needed. Follow-up and Dispositions    Return in about 4 months (around 6/8/2023) for Follow up. Signed,  Maxwell Cooper.  Chitra Seo, MSN APRN FNP-BC

## 2023-02-08 NOTE — PROGRESS NOTES
Hemanth Lindsey is a 46 y.o. female     Chief Complaint   Patient presents with    Cholesterol Problem     4M       Visit Vitals  /76 (BP 1 Location: Left upper arm, BP Patient Position: Sitting, BP Cuff Size: Adult)   Pulse 73   Temp 98.1 °F (36.7 °C) (Oral)   Resp 16   Ht 5' 2\" (1.575 m)   Wt 237 lb 12.8 oz (107.9 kg)   SpO2 99%   BMI 43.49 kg/m²       Health Maintenance Due   Topic Date Due    Cervical cancer screen  Never done    Colorectal Cancer Screening Combo  Never done    Shingles Vaccine (1 of 2) Never done    Low dose CT lung screening  Never done    Breast Cancer Screen Mammogram  Never done    COVID-19 Vaccine (4 - Booster for Pfizer series) 02/04/2022    Flu Vaccine (1) 08/01/2022         1. \"Have you been to the ER, urgent care clinic since your last visit? Hospitalized since your last visit? \" No    2. \"Have you seen or consulted any other health care providers outside of the [address] [place] [address] since your last visit? \" No     3. For patients aged 39-70: Has the patient had a colonoscopy / FIT/ Cologuard? No      If the patient is female:    4. For patients aged 41-77: Has the patient had a mammogram within the past 2 years? No      5. For patients aged 21-65: Has the patient had a pap smear?  No

## 2023-02-09 LAB
ALBUMIN SERPL-MCNC: 4 G/DL (ref 3.5–5)
ALBUMIN/GLOB SERPL: 1.3 (ref 1.1–2.2)
ALP SERPL-CCNC: 110 U/L (ref 45–117)
ALT SERPL-CCNC: 21 U/L (ref 12–78)
ANION GAP SERPL CALC-SCNC: 8 MMOL/L (ref 5–15)
APPEARANCE UR: CLEAR
AST SERPL-CCNC: 12 U/L (ref 15–37)
BILIRUB SERPL-MCNC: 0.4 MG/DL (ref 0.2–1)
BILIRUB UR QL: NEGATIVE
BUN SERPL-MCNC: 10 MG/DL (ref 6–20)
BUN/CREAT SERPL: 13 (ref 12–20)
CALCIUM SERPL-MCNC: 9.4 MG/DL (ref 8.5–10.1)
CHLORIDE SERPL-SCNC: 103 MMOL/L (ref 97–108)
CHOLEST SERPL-MCNC: 170 MG/DL
CK SERPL-CCNC: 90 U/L (ref 26–192)
CO2 SERPL-SCNC: 26 MMOL/L (ref 21–32)
COLOR UR: NORMAL
CREAT SERPL-MCNC: 0.8 MG/DL (ref 0.55–1.02)
ERYTHROCYTE [DISTWIDTH] IN BLOOD BY AUTOMATED COUNT: 13.7 % (ref 11.5–14.5)
GLOBULIN SER CALC-MCNC: 3 G/DL (ref 2–4)
GLUCOSE SERPL-MCNC: 96 MG/DL (ref 65–100)
GLUCOSE UR STRIP.AUTO-MCNC: NEGATIVE MG/DL
HCT VFR BLD AUTO: 48.6 % (ref 35–47)
HDLC SERPL-MCNC: 54 MG/DL
HDLC SERPL: 3.1 (ref 0–5)
HGB BLD-MCNC: 15.5 G/DL (ref 11.5–16)
HGB UR QL STRIP: NEGATIVE
KETONES UR QL STRIP.AUTO: NEGATIVE MG/DL
LDLC SERPL CALC-MCNC: 86.8 MG/DL (ref 0–100)
LEUKOCYTE ESTERASE UR QL STRIP.AUTO: NEGATIVE
MCH RBC QN AUTO: 28.8 PG (ref 26–34)
MCHC RBC AUTO-ENTMCNC: 31.9 G/DL (ref 30–36.5)
MCV RBC AUTO: 90.3 FL (ref 80–99)
NITRITE UR QL STRIP.AUTO: NEGATIVE
NRBC # BLD: 0 K/UL (ref 0–0.01)
NRBC BLD-RTO: 0 PER 100 WBC
PH UR STRIP: 6.5 (ref 5–8)
PLATELET # BLD AUTO: 266 K/UL (ref 150–400)
PMV BLD AUTO: 10.6 FL (ref 8.9–12.9)
POTASSIUM SERPL-SCNC: 3.9 MMOL/L (ref 3.5–5.1)
PROT SERPL-MCNC: 7 G/DL (ref 6.4–8.2)
PROT UR STRIP-MCNC: NEGATIVE MG/DL
RBC # BLD AUTO: 5.38 M/UL (ref 3.8–5.2)
SODIUM SERPL-SCNC: 137 MMOL/L (ref 136–145)
SP GR UR REFRACTOMETRY: <1.005 (ref 1–1.03)
TRIGL SERPL-MCNC: 146 MG/DL (ref ?–150)
TSH SERPL DL<=0.05 MIU/L-ACNC: 0.8 UIU/ML (ref 0.36–3.74)
UROBILINOGEN UR QL STRIP.AUTO: 0.2 EU/DL (ref 0.2–1)
VLDLC SERPL CALC-MCNC: 29.2 MG/DL
WBC # BLD AUTO: 10.2 K/UL (ref 3.6–11)

## 2023-02-09 NOTE — PROGRESS NOTES
Urinalysis is unremarkable. Thyroid shows to be in healthy range. Cholesterol levels look good overall. Continue atorvastatin daily. Metabolic panel is unremarkable. Blood counts are okay. There are some elevations in your counts due to your smoking. This is called secondary polycythemia. Repeat labs in 6 months.

## 2023-03-03 DIAGNOSIS — F90.9 ATTENTION DEFICIT HYPERACTIVITY DISORDER (ADHD), UNSPECIFIED ADHD TYPE: ICD-10-CM

## 2023-03-03 RX ORDER — METHYLPHENIDATE HYDROCHLORIDE 36 MG/1
36 TABLET ORAL
Qty: 30 TABLET | Refills: 0 | Status: SHIPPED | OUTPATIENT
Start: 2023-03-03

## 2023-03-03 NOTE — TELEPHONE ENCOUNTER
PCP: Maria Esther Green NP    Last appt: 2/8/2023  Future Appointments   Date Time Provider Richie David   6/8/2023  3:00 PM Maria Esther Green NP [facility] BS AMB       Requested Prescriptions     Pending Prescriptions Disp Refills    methylphenidate ER 36 mg 24 hr tab 30 Tablet 0     Sig: Take 1 Tablet by mouth every morning.  Max Daily Amount: 36 mg.       Prior labs and Blood pressures:  BP Readings from Last 3 Encounters:   02/08/23 128/76   10/11/22 132/74   06/08/22 128/74     Lab Results   Component Value Date/Time    Sodium 137 02/08/2023 09:08 AM    Potassium 3.9 02/08/2023 09:08 AM    Chloride 103 02/08/2023 09:08 AM    CO2 26 02/08/2023 09:08 AM    Anion gap 8 02/08/2023 09:08 AM    Glucose 96 02/08/2023 09:08 AM    BUN 10 02/08/2023 09:08 AM    Creatinine 0.80 02/08/2023 09:08 AM    BUN/Creatinine ratio 13 02/08/2023 09:08 AM    GFR est AA >60 06/08/2022 10:17 AM    GFR est non-AA >60 06/08/2022 10:17 AM    Calcium 9.4 02/08/2023 09:08 AM     Lab Results   Component Value Date/Time    Hemoglobin A1c 5.0 12/07/2021 02:33 PM    Hemoglobin A1c (POC) 5.1 05/17/2018 10:39 AM     Lab Results   Component Value Date/Time    Cholesterol, total 170 02/08/2023 09:08 AM    Cholesterol (POC) 206.0 (A) 05/17/2018 10:39 AM    HDL Cholesterol 54 02/08/2023 09:08 AM    HDL Cholesterol (POC) 48.0 05/17/2018 10:39 AM    LDL Cholesterol (POC) 109.0 05/17/2018 10:39 AM    LDL, calculated 86.8 02/08/2023 09:08 AM    VLDL, calculated 29.2 02/08/2023 09:08 AM    Triglyceride 146 02/08/2023 09:08 AM    Triglycerides (POC) 245.0 (A) 05/17/2018 10:39 AM    CHOL/HDL Ratio 3.1 02/08/2023 09:08 AM     Lab Results   Component Value Date/Time    Vitamin D 25-Hydroxy 39.8 12/07/2021 02:33 PM       Lab Results   Component Value Date/Time    TSH 0.80 02/08/2023 09:08 AM    TSH, 3rd generation 0.83 01/19/2021 08:44 AM

## 2023-03-09 DIAGNOSIS — F90.9 ATTENTION DEFICIT HYPERACTIVITY DISORDER (ADHD), UNSPECIFIED ADHD TYPE: ICD-10-CM

## 2023-03-09 RX ORDER — METHYLPHENIDATE HYDROCHLORIDE 36 MG/1
36 TABLET ORAL
Qty: 30 TABLET | Refills: 0 | Status: SHIPPED | OUTPATIENT
Start: 2023-03-09

## 2023-03-09 NOTE — TELEPHONE ENCOUNTER
PCP: Ton Bradshaw NP    Last appt: 2/8/2023  Future Appointments   Date Time Provider Richie David   6/8/2023  3:00 PM Ton Bradshaw NP [facility] BS AMB       Requested Prescriptions     Pending Prescriptions Disp Refills    methylphenidate ER 36 mg 24 hr tab 30 Tablet 0     Sig: Take 1 Tablet by mouth every morning.  Max Daily Amount: 36 mg.       Prior labs and Blood pressures:  BP Readings from Last 3 Encounters:   02/08/23 128/76   10/11/22 132/74   06/08/22 128/74     Lab Results   Component Value Date/Time    Sodium 137 02/08/2023 09:08 AM    Potassium 3.9 02/08/2023 09:08 AM    Chloride 103 02/08/2023 09:08 AM    CO2 26 02/08/2023 09:08 AM    Anion gap 8 02/08/2023 09:08 AM    Glucose 96 02/08/2023 09:08 AM    BUN 10 02/08/2023 09:08 AM    Creatinine 0.80 02/08/2023 09:08 AM    BUN/Creatinine ratio 13 02/08/2023 09:08 AM    GFR est AA >60 06/08/2022 10:17 AM    GFR est non-AA >60 06/08/2022 10:17 AM    Calcium 9.4 02/08/2023 09:08 AM

## 2023-04-01 DIAGNOSIS — G47.00 INSOMNIA, UNSPECIFIED TYPE: ICD-10-CM

## 2023-04-01 DIAGNOSIS — F90.9 ATTENTION DEFICIT HYPERACTIVITY DISORDER (ADHD), UNSPECIFIED ADHD TYPE: ICD-10-CM

## 2023-04-03 RX ORDER — CLONAZEPAM 0.5 MG/1
TABLET ORAL
Qty: 90 TABLET | Refills: 0 | Status: SHIPPED | OUTPATIENT
Start: 2023-04-03

## 2023-04-03 RX ORDER — METHYLPHENIDATE HYDROCHLORIDE 36 MG/1
36 TABLET ORAL
Qty: 30 TABLET | Refills: 0 | Status: SHIPPED | OUTPATIENT
Start: 2023-04-03

## 2023-04-03 NOTE — TELEPHONE ENCOUNTER
2 cc air successfully removed from vasc band.  Site clean and dry   PCP: James Lala NP    Last appt: 2/8/2023  Future Appointments   Date Time Provider Richie David   6/8/2023  3:00 PM James Lala NP [facility] BS AMB       Requested Prescriptions     Pending Prescriptions Disp Refills    methylphenidate ER 36 mg 24 hr tab 30 Tablet 0     Sig: Take 1 Tablet by mouth every morning.  Max Daily Amount: 36 mg.       Prior labs and Blood pressures:  BP Readings from Last 3 Encounters:   02/08/23 128/76   10/11/22 132/74   06/08/22 128/74     Lab Results   Component Value Date/Time    Sodium 137 02/08/2023 09:08 AM    Potassium 3.9 02/08/2023 09:08 AM    Chloride 103 02/08/2023 09:08 AM    CO2 26 02/08/2023 09:08 AM    Anion gap 8 02/08/2023 09:08 AM    Glucose 96 02/08/2023 09:08 AM    BUN 10 02/08/2023 09:08 AM    Creatinine 0.80 02/08/2023 09:08 AM    BUN/Creatinine ratio 13 02/08/2023 09:08 AM    GFR est AA >60 06/08/2022 10:17 AM    GFR est non-AA >60 06/08/2022 10:17 AM    Calcium 9.4 02/08/2023 09:08 AM     Lab Results   Component Value Date/Time    Hemoglobin A1c 5.0 12/07/2021 02:33 PM    Hemoglobin A1c (POC) 5.1 05/17/2018 10:39 AM     Lab Results   Component Value Date/Time    Cholesterol, total 170 02/08/2023 09:08 AM    Cholesterol (POC) 206.0 (A) 05/17/2018 10:39 AM    HDL Cholesterol 54 02/08/2023 09:08 AM    HDL Cholesterol (POC) 48.0 05/17/2018 10:39 AM    LDL Cholesterol (POC) 109.0 05/17/2018 10:39 AM    LDL, calculated 86.8 02/08/2023 09:08 AM    VLDL, calculated 29.2 02/08/2023 09:08 AM    Triglyceride 146 02/08/2023 09:08 AM    Triglycerides (POC) 245.0 (A) 05/17/2018 10:39 AM    CHOL/HDL Ratio 3.1 02/08/2023 09:08 AM     Lab Results   Component Value Date/Time    Vitamin D 25-Hydroxy 39.8 12/07/2021 02:33 PM       Lab Results   Component Value Date/Time    TSH 0.80 02/08/2023 09:08 AM    TSH, 3rd generation 0.83 01/19/2021 08:44 AM

## 2023-04-17 DIAGNOSIS — M62.830 BACK MUSCLE SPASM: ICD-10-CM

## 2023-04-17 RX ORDER — METHOCARBAMOL 500 MG/1
TABLET, FILM COATED ORAL
Qty: 90 TABLET | Refills: 1 | Status: SHIPPED | OUTPATIENT
Start: 2023-04-17

## 2023-05-02 RX ORDER — ATORVASTATIN CALCIUM 20 MG/1
TABLET, FILM COATED ORAL
Qty: 90 TABLET | Refills: 1 | Status: SHIPPED | OUTPATIENT
Start: 2023-05-02

## 2023-05-11 DIAGNOSIS — F90.0 ATTENTION-DEFICIT HYPERACTIVITY DISORDER, PREDOMINANTLY INATTENTIVE TYPE: Primary | ICD-10-CM

## 2023-05-11 DIAGNOSIS — F90.0 ATTENTION-DEFICIT HYPERACTIVITY DISORDER, PREDOMINANTLY INATTENTIVE TYPE: ICD-10-CM

## 2023-05-11 RX ORDER — METHYLPHENIDATE HYDROCHLORIDE 36 MG/1
36 TABLET, EXTENDED RELEASE ORAL EVERY MORNING
Qty: 30 TABLET | Refills: 0 | Status: SHIPPED | OUTPATIENT
Start: 2023-05-11 | End: 2023-05-11 | Stop reason: SDUPTHER

## 2023-05-11 RX ORDER — METHYLPHENIDATE HYDROCHLORIDE 36 MG/1
36 TABLET, EXTENDED RELEASE ORAL EVERY MORNING
Qty: 30 TABLET | Refills: 0 | Status: SHIPPED | OUTPATIENT
Start: 2023-05-11 | End: 2023-06-10

## 2023-05-11 NOTE — TELEPHONE ENCOUNTER
Medication is out of stock at [facility] please send medication to [facility]

## 2023-06-07 SDOH — ECONOMIC STABILITY: FOOD INSECURITY: WITHIN THE PAST 12 MONTHS, THE FOOD YOU BOUGHT JUST DIDN'T LAST AND YOU DIDN'T HAVE MONEY TO GET MORE.: NEVER TRUE

## 2023-06-07 SDOH — ECONOMIC STABILITY: TRANSPORTATION INSECURITY
IN THE PAST 12 MONTHS, HAS LACK OF TRANSPORTATION KEPT YOU FROM MEETINGS, WORK, OR FROM GETTING THINGS NEEDED FOR DAILY LIVING?: NO

## 2023-06-07 SDOH — ECONOMIC STABILITY: FOOD INSECURITY: WITHIN THE PAST 12 MONTHS, YOU WORRIED THAT YOUR FOOD WOULD RUN OUT BEFORE YOU GOT MONEY TO BUY MORE.: NEVER TRUE

## 2023-06-07 SDOH — ECONOMIC STABILITY: HOUSING INSECURITY
IN THE LAST 12 MONTHS, WAS THERE A TIME WHEN YOU DID NOT HAVE A STEADY PLACE TO SLEEP OR SLEPT IN A SHELTER (INCLUDING NOW)?: NO

## 2023-06-07 SDOH — ECONOMIC STABILITY: INCOME INSECURITY: HOW HARD IS IT FOR YOU TO PAY FOR THE VERY BASICS LIKE FOOD, HOUSING, MEDICAL CARE, AND HEATING?: NOT HARD AT ALL

## 2023-06-08 ENCOUNTER — OFFICE VISIT (OUTPATIENT)
Facility: CLINIC | Age: 53
End: 2023-06-08
Payer: COMMERCIAL

## 2023-06-08 VITALS
HEART RATE: 107 BPM | TEMPERATURE: 98.1 F | WEIGHT: 232.4 LBS | DIASTOLIC BLOOD PRESSURE: 78 MMHG | BODY MASS INDEX: 42.76 KG/M2 | HEIGHT: 62 IN | OXYGEN SATURATION: 98 % | SYSTOLIC BLOOD PRESSURE: 128 MMHG | RESPIRATION RATE: 18 BRPM

## 2023-06-08 DIAGNOSIS — F90.0 ATTENTION-DEFICIT HYPERACTIVITY DISORDER, PREDOMINANTLY INATTENTIVE TYPE: Primary | ICD-10-CM

## 2023-06-08 DIAGNOSIS — F41.1 GAD (GENERALIZED ANXIETY DISORDER): ICD-10-CM

## 2023-06-08 DIAGNOSIS — E66.01 SEVERE OBESITY (BMI >= 40) (HCC): ICD-10-CM

## 2023-06-08 DIAGNOSIS — F17.210 CIGARETTE NICOTINE DEPENDENCE WITHOUT COMPLICATION: ICD-10-CM

## 2023-06-08 DIAGNOSIS — I10 ESSENTIAL HYPERTENSION: ICD-10-CM

## 2023-06-08 DIAGNOSIS — E78.2 MIXED HYPERLIPIDEMIA: ICD-10-CM

## 2023-06-08 PROCEDURE — 3078F DIAST BP <80 MM HG: CPT | Performed by: NURSE PRACTITIONER

## 2023-06-08 PROCEDURE — 99214 OFFICE O/P EST MOD 30 MIN: CPT | Performed by: NURSE PRACTITIONER

## 2023-06-08 PROCEDURE — 3074F SYST BP LT 130 MM HG: CPT | Performed by: NURSE PRACTITIONER

## 2023-06-08 SDOH — ECONOMIC STABILITY: FOOD INSECURITY: WITHIN THE PAST 12 MONTHS, THE FOOD YOU BOUGHT JUST DIDN'T LAST AND YOU DIDN'T HAVE MONEY TO GET MORE.: NEVER TRUE

## 2023-06-08 SDOH — ECONOMIC STABILITY: FOOD INSECURITY: WITHIN THE PAST 12 MONTHS, YOU WORRIED THAT YOUR FOOD WOULD RUN OUT BEFORE YOU GOT MONEY TO BUY MORE.: NEVER TRUE

## 2023-06-08 SDOH — ECONOMIC STABILITY: INCOME INSECURITY: HOW HARD IS IT FOR YOU TO PAY FOR THE VERY BASICS LIKE FOOD, HOUSING, MEDICAL CARE, AND HEATING?: NOT HARD AT ALL

## 2023-06-08 NOTE — PROGRESS NOTES
Unruly Krishnamurthy is a 48 y.o. female     Chief Complaint   Patient presents with    Hypertension     4 month follow up. /78 (Site: Right Upper Arm, Position: Sitting, Cuff Size: Large Adult)   Pulse (!) 107   Temp 98.1 °F (36.7 °C) (Oral)   Resp 18   Ht 5' 2\" (1.575 m)   Wt 232 lb 6.4 oz (105.4 kg)   SpO2 98%   BMI 42.51 kg/m²     Health Maintenance Due   Topic Date Due    Pneumococcal 0-64 years Vaccine (1 - PCV) Never done    HIV screen  Never done    Cervical cancer screen  Never done    Colorectal Cancer Screen  Never done    Breast cancer screen  Never done    Shingles vaccine (1 of 2) Never done    Low dose CT lung screening &/or counseling  Never done    COVID-19 Vaccine (4 - Booster for Pfizer series) 02/04/2022         1. \"Have you been to the ER, urgent care clinic since your last visit? Hospitalized since your last visit? \" No    2. \"Have you seen or consulted any other health care providers outside of the [address] [place] [address] since your last visit? \" No     3. For patients aged 39-70: Has the patient had a colonoscopy / FIT/ Cologuard? No      If the patient is female:    4. For patients aged 41-77: Has the patient had a mammogram within the past 2 years? No      5. For patients aged 21-65: Has the patient had a pap smear?  No

## 2023-06-08 NOTE — PROGRESS NOTES
Chief Complaint   Patient presents with    Hypertension     4 month follow up. SUBJECTIVE:    Paul Espana is a 48 y.o. female who is here today for a follow up appointment regarding current medical conditions including: ADHD predominantly inattentive type, CONNER, severe obesity, essential hypertension, mixed hyperlipidemia, and ongoing nicotine dependence. The patient continues to be monitored periodically for management of her ADHD predominantly inattentive type. She is currently on methylphenidate 36 mg daily which she has been tolerating well. She has responded well to the medication and feels that her symptoms are well controlled overall. She states the medication allows for improved focus, decreased distractibility, and she is able to complete tasks in a more timely manner. 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 She denies any adverse side effects of the medication. She has followed up at appropriate intervals and urine drug screens have been without incident. However, she is frustrated about the lack of availability of the medication, and has been having difficulty finding it at times. She continues to take clonazepam as prescribed for management of her anxiety. She is tolerating the medication well and feels her symptoms are stable. She continues to take lisinopril/HCTZ daily for management of her hypertension. Her blood pressure remains stable. She denies any adverse side effects of the medication. She continues to use atorvastatin 20 mg daily for management of her mixed hyperlipidemia and tolerating this well. She denies any recent episodes of chest pain, chest pressure, shortness of breath, headaches, dizziness, blurred vision, palpitations, or syncope episodes. She continues to smoke cigarettes despite health risks and repeated warnings.     Current Outpatient Medications   Medication Sig

## 2023-06-09 DIAGNOSIS — F90.0 ATTENTION-DEFICIT HYPERACTIVITY DISORDER, PREDOMINANTLY INATTENTIVE TYPE: ICD-10-CM

## 2023-06-09 RX ORDER — METHYLPHENIDATE HYDROCHLORIDE 36 MG/1
36 TABLET, EXTENDED RELEASE ORAL EVERY MORNING
Qty: 30 TABLET | Refills: 0 | Status: SHIPPED | OUTPATIENT
Start: 2023-06-09 | End: 2023-07-09

## 2023-06-09 NOTE — TELEPHONE ENCOUNTER
PCP: DAVID Oliveira NP    Last appt:6/9/23  Future Appointments   Date Time Provider Abdirashid Ramirez   11/2/2023  8:00 AM DAVID Oliveira NP [facility] AMB       Last refilled:5/11/23    Requested Prescriptions     Pending Prescriptions Disp Refills    methylphenidate 36 MG CR tablet 30 tablet 0     Sig: Take 1 tablet by mouth every morning for 30 days.  Max Daily Amount: 36 mg

## 2023-06-19 DIAGNOSIS — M62.830 MUSCLE SPASM OF BACK: ICD-10-CM

## 2023-06-19 RX ORDER — METHOCARBAMOL 500 MG/1
TABLET, FILM COATED ORAL
Qty: 90 TABLET | Refills: 1 | Status: SHIPPED | OUTPATIENT
Start: 2023-06-19

## 2023-06-19 NOTE — TELEPHONE ENCOUNTER
PCP: DAVID Donahue NP    Last appt: 6/8/2023  Future Appointments   Date Time Provider Abdirashid Ramirez   11/2/2023  8:00 AM DAVID Donahue NP [facility]       Requested Prescriptions     Pending Prescriptions Disp Refills    methocarbamol (ROBAXIN) 500 MG tablet [Pharmacy Med Name: METHOCARBAMOL 500 MG TABLET] 90 tablet 1     Sig: TAKE 1 TABLET BY MOUTH THREE TIMES A DAY AS NEEDED FOR MUSCLE SPASMS       Prior labs and Blood pressures:  BP Readings from Last 3 Encounters:   06/08/23 128/78   02/08/23 128/76   10/11/22 132/74     Lab Results   Component Value Date/Time     02/08/2023 09:08 AM    K 3.9 02/08/2023 09:08 AM     02/08/2023 09:08 AM    CO2 26 02/08/2023 09:08 AM    BUN 10 02/08/2023 09:08 AM    GFRAA >60 06/08/2022 10:17 AM     No results found for: HBA1C, [id number]  Lab Results   Component Value Date/Time    CHOL 170 02/08/2023 09:08 AM    HDL 54 02/08/2023 09:08 AM    VLDL 37 03/09/2021 10:27 AM     No results found for: VITD3, VD3RIA        Lab Results   Component Value Date/Time    TSH 0.80 02/08/2023 09:08 AM

## 2023-06-22 RX ORDER — LISINOPRIL AND HYDROCHLOROTHIAZIDE 12.5; 1 MG/1; MG/1
TABLET ORAL
Qty: 90 TABLET | Refills: 1 | Status: SHIPPED | OUTPATIENT
Start: 2023-06-22

## 2023-06-22 RX ORDER — PRIMIDONE 50 MG/1
TABLET ORAL
Qty: 90 TABLET | Refills: 1 | Status: SHIPPED | OUTPATIENT
Start: 2023-06-22

## 2023-06-22 NOTE — TELEPHONE ENCOUNTER
PCP: DAVID Yang NP    Last appt: 6/8/23  Future Appointments   Date Time Provider [address]   11/2/2023  8:00 AM DAVID Yang NP [facility] AMB       Last refilled:6/24/22    Requested Prescriptions     Pending Prescriptions Disp Refills    primidone (MYSOLINE) 50 MG tablet [Pharmacy Med Name: PRIMIDONE 50 MG TABLET] 90 tablet 3     Sig: TAKE 1 TABLET BY MOUTH EVERY DAY    lisinopril-hydroCHLOROthiazide (PRINZIDE;ZESTORETIC) 10-12.5 MG per tablet [Pharmacy Med Name: LISINOPRIL-HCTZ 10-12.5 MG TAB] 90 tablet 3     Sig: TAKE 1 TABLET BY MOUTH EVERY DAY

## 2023-06-26 DIAGNOSIS — F41.1 GAD (GENERALIZED ANXIETY DISORDER): Primary | ICD-10-CM

## 2023-06-27 RX ORDER — CLONAZEPAM 0.5 MG/1
TABLET ORAL
Qty: 90 TABLET | Refills: 0 | Status: SHIPPED | OUTPATIENT
Start: 2023-06-27 | End: 2023-09-27

## 2023-06-30 DIAGNOSIS — J30.89 OTHER ALLERGIC RHINITIS: ICD-10-CM

## 2023-07-02 RX ORDER — AZELASTINE HYDROCHLORIDE, FLUTICASONE PROPIONATE 137; 50 UG/1; UG/1
SPRAY, METERED NASAL
Qty: 23 EACH | Refills: 6 | Status: SHIPPED | OUTPATIENT
Start: 2023-07-02

## 2023-07-07 DIAGNOSIS — F90.0 ATTENTION-DEFICIT HYPERACTIVITY DISORDER, PREDOMINANTLY INATTENTIVE TYPE: ICD-10-CM

## 2023-07-07 RX ORDER — METHYLPHENIDATE HYDROCHLORIDE 36 MG/1
36 TABLET, EXTENDED RELEASE ORAL EVERY MORNING
Qty: 30 TABLET | Refills: 0 | Status: SHIPPED | OUTPATIENT
Start: 2023-07-07 | End: 2023-08-06

## 2023-07-07 NOTE — TELEPHONE ENCOUNTER
PCP: Allena Cabot, APRN - NP    Last appt: 6/8/2023  Future Appointments   Date Time Provider [address]   11/2/2023  8:00 AM Allena Cabot, APRN - NP [facility]       Requested Prescriptions     Pending Prescriptions Disp Refills    methylphenidate 36 MG CR tablet 30 tablet 0     Sig: Take 1 tablet by mouth every morning for 30 days.  Max Daily Amount: 36 mg       Prior labs and Blood pressures:  BP Readings from Last 3 Encounters:   06/08/23 128/78   02/08/23 128/76   10/11/22 132/74     Lab Results   Component Value Date/Time     02/08/2023 09:08 AM    K 3.9 02/08/2023 09:08 AM     02/08/2023 09:08 AM    CO2 26 02/08/2023 09:08 AM    BUN 10 02/08/2023 09:08 AM    GFRAA >60 06/08/2022 10:17 AM     No results found for: HBA1C, [id number]  Lab Results   Component Value Date/Time    CHOL 170 02/08/2023 09:08 AM    HDL 54 02/08/2023 09:08 AM    VLDL 37 03/09/2021 10:27 AM     No results found for: VITD3, VD3RIA        Lab Results   Component Value Date/Time    TSH 0.80 02/08/2023 09:08 AM

## 2023-08-07 DIAGNOSIS — F90.0 ATTENTION-DEFICIT HYPERACTIVITY DISORDER, PREDOMINANTLY INATTENTIVE TYPE: ICD-10-CM

## 2023-08-07 RX ORDER — METHYLPHENIDATE HYDROCHLORIDE 36 MG/1
36 TABLET, EXTENDED RELEASE ORAL EVERY MORNING
Qty: 30 TABLET | Refills: 0 | Status: SHIPPED | OUTPATIENT
Start: 2023-08-07 | End: 2023-09-06

## 2023-08-07 NOTE — TELEPHONE ENCOUNTER
PCP: DAVID Garnica NP    Last appt: 6/8/2023  Future Appointments   Date Time Provider [address]   11/2/2023  8:00 AM DAVID Garnica NP [facility]B       Requested Prescriptions     Pending Prescriptions Disp Refills    methylphenidate 36 MG CR tablet 30 tablet 0     Sig: Take 1 tablet by mouth every morning for 30 days.  Max Daily Amount: 36 mg       Prior labs and Blood pressures:  BP Readings from Last 3 Encounters:   06/08/23 128/78   02/08/23 128/76   10/11/22 132/74     Lab Results   Component Value Date/Time     02/08/2023 09:08 AM    K 3.9 02/08/2023 09:08 AM     02/08/2023 09:08 AM    CO2 26 02/08/2023 09:08 AM    BUN 10 02/08/2023 09:08 AM    GFRAA >60 06/08/2022 10:17 AM     No results found for: HBA1C, [id number]  Lab Results   Component Value Date/Time    CHOL 170 02/08/2023 09:08 AM    HDL 54 02/08/2023 09:08 AM    VLDL 37 03/09/2021 10:27 AM     No results found for: VITD3, VD3RIA        Lab Results   Component Value Date/Time    TSH 0.80 02/08/2023 09:08 AM

## 2023-08-18 DIAGNOSIS — M62.830 MUSCLE SPASM OF BACK: ICD-10-CM

## 2023-08-18 RX ORDER — METHOCARBAMOL 500 MG/1
TABLET, FILM COATED ORAL
Qty: 90 TABLET | Refills: 1 | Status: SHIPPED | OUTPATIENT
Start: 2023-08-18

## 2023-08-18 NOTE — TELEPHONE ENCOUNTER
RX refill request from the patient/pharmacy. Patient last seen 06- with labs, and next appt. scheduled for 11-  Requested Prescriptions     Pending Prescriptions Disp Refills    methocarbamol (ROBAXIN) 500 MG tablet [Pharmacy Med Name: METHOCARBAMOL 500 MG TABLET] 90 tablet 1     Sig: TAKE 1 TABLET BY MOUTH THREE TIMES A DAY AS NEEDED FOR MUSCLE SPASM    .

## 2023-09-06 DIAGNOSIS — F90.0 ATTENTION-DEFICIT HYPERACTIVITY DISORDER, PREDOMINANTLY INATTENTIVE TYPE: ICD-10-CM

## 2023-09-06 RX ORDER — METHYLPHENIDATE HYDROCHLORIDE 36 MG/1
36 TABLET, EXTENDED RELEASE ORAL EVERY MORNING
Qty: 30 TABLET | Refills: 0 | Status: SHIPPED | OUTPATIENT
Start: 2023-09-06 | End: 2023-10-06

## 2023-09-06 NOTE — TELEPHONE ENCOUNTER
PCP: DAVID Moss NP    Last appt: 6/8/2023    Future Appointments   Date Time Provider [facility] [address]   11/2/2023  8:00 AM DAVID Moss NP [facility] AMB       Requested Prescriptions     Pending Prescriptions Disp Refills    methylphenidate 36 MG CR tablet 30 tablet 0     Sig: Take 1 tablet by mouth every morning for 30 days.  Max Daily Amount: 36 mg

## 2023-09-25 DIAGNOSIS — F41.1 GAD (GENERALIZED ANXIETY DISORDER): ICD-10-CM

## 2023-09-25 DIAGNOSIS — M62.830 MUSCLE SPASM OF BACK: ICD-10-CM

## 2023-09-25 RX ORDER — ALBUTEROL SULFATE 90 UG/1
AEROSOL, METERED RESPIRATORY (INHALATION)
Qty: 6.7 EACH | Refills: 6 | Status: SHIPPED | OUTPATIENT
Start: 2023-09-25

## 2023-09-25 RX ORDER — METHOCARBAMOL 500 MG/1
TABLET, FILM COATED ORAL
Qty: 90 TABLET | Refills: 1 | Status: SHIPPED | OUTPATIENT
Start: 2023-09-25

## 2023-09-25 RX ORDER — ATORVASTATIN CALCIUM 20 MG/1
20 TABLET, FILM COATED ORAL DAILY
Qty: 90 TABLET | Refills: 1 | Status: SHIPPED | OUTPATIENT
Start: 2023-09-25

## 2023-09-25 RX ORDER — CLONAZEPAM 0.5 MG/1
TABLET ORAL
Qty: 90 TABLET | Refills: 0 | Status: SHIPPED | OUTPATIENT
Start: 2023-09-25 | End: 2023-10-25

## 2023-09-25 NOTE — TELEPHONE ENCOUNTER
PCP: DAVID Tirado NP    Last appt: 6/8/2023    Future Appointments   Date Time Provider [facility] [address]   11/2/2023  8:00 AM DAVID Tirado NP [facility] AMB       Requested Prescriptions     Pending Prescriptions Disp Refills    clonazePAM (KLONOPIN) 0.5 MG tablet [Pharmacy Med Name: CLONAZEPAM 0.5 MG TABLET] 90 tablet 0     Sig: TAKE 1 TABLET BY MOUTH EVERY DAY AT BEDTIME AS NEEDED FOR ANXIETY    albuterol sulfate HFA (PROVENTIL;VENTOLIN;PROAIR) 108 (90 Base) MCG/ACT inhaler [Pharmacy Med Name: ALBUTEROL HFA (PROVENTIL) INH] 6.7 each 6     Sig: INHALE 2 PUFFS BY MOUTH EVERY 4 HOURS AS NEEDED FOR WHEEZE    atorvastatin (LIPITOR) 20 MG tablet [Pharmacy Med Name: ATORVASTATIN 20 MG TABLET] 90 tablet 1     Sig: TAKE 1 TABLET BY MOUTH EVERY DAY    methocarbamol (ROBAXIN) 500 MG tablet [Pharmacy Med Name: METHOCARBAMOL 500 MG TABLET] 90 tablet 1     Sig: TAKE 1 TABLET BY MOUTH THREE TIMES A DAY AS NEEDED FOR MUSCLE SPASM

## 2023-10-06 DIAGNOSIS — F90.0 ATTENTION-DEFICIT HYPERACTIVITY DISORDER, PREDOMINANTLY INATTENTIVE TYPE: ICD-10-CM

## 2023-10-06 RX ORDER — METHYLPHENIDATE HYDROCHLORIDE 36 MG/1
36 TABLET, EXTENDED RELEASE ORAL EVERY MORNING
Qty: 30 TABLET | Refills: 0 | Status: SHIPPED | OUTPATIENT
Start: 2023-10-06 | End: 2023-11-05

## 2023-10-06 NOTE — TELEPHONE ENCOUNTER
PCP: Duard Bloch, APRN - NP    Last appt: 6/8/2023    Future Appointments   Date Time Provider [facility] [address]   11/2/2023  8:00 AM Duard Bloch, APRN - NP [facility] AMB       Requested Prescriptions     Pending Prescriptions Disp Refills    methylphenidate 36 MG CR tablet 30 tablet 0     Sig: Take 1 tablet by mouth every morning for 30 days.  Max Daily Amount: 36 mg

## 2023-11-02 ENCOUNTER — OFFICE VISIT (OUTPATIENT)
Facility: CLINIC | Age: 53
End: 2023-11-02

## 2023-11-02 VITALS
SYSTOLIC BLOOD PRESSURE: 124 MMHG | OXYGEN SATURATION: 98 % | DIASTOLIC BLOOD PRESSURE: 72 MMHG | TEMPERATURE: 97.8 F | RESPIRATION RATE: 16 BRPM | HEIGHT: 62 IN | HEART RATE: 76 BPM | BODY MASS INDEX: 43.36 KG/M2 | WEIGHT: 235.6 LBS

## 2023-11-02 DIAGNOSIS — Z12.11 SCREEN FOR COLON CANCER: ICD-10-CM

## 2023-11-02 DIAGNOSIS — E78.2 MIXED HYPERLIPIDEMIA: ICD-10-CM

## 2023-11-02 DIAGNOSIS — Z12.31 ENCOUNTER FOR SCREENING MAMMOGRAM FOR MALIGNANT NEOPLASM OF BREAST: ICD-10-CM

## 2023-11-02 DIAGNOSIS — J41.0 SMOKERS' COUGH (HCC): ICD-10-CM

## 2023-11-02 DIAGNOSIS — Z87.891 PERSONAL HISTORY OF TOBACCO USE: ICD-10-CM

## 2023-11-02 DIAGNOSIS — F90.0 ATTENTION-DEFICIT HYPERACTIVITY DISORDER, PREDOMINANTLY INATTENTIVE TYPE: ICD-10-CM

## 2023-11-02 DIAGNOSIS — Z71.6 ENCOUNTER FOR TOBACCO USE CESSATION COUNSELING: ICD-10-CM

## 2023-11-02 DIAGNOSIS — F41.1 GAD (GENERALIZED ANXIETY DISORDER): ICD-10-CM

## 2023-11-02 DIAGNOSIS — Z02.83 ENCOUNTER FOR DRUG SCREENING: ICD-10-CM

## 2023-11-02 DIAGNOSIS — I10 ESSENTIAL HYPERTENSION: ICD-10-CM

## 2023-11-02 DIAGNOSIS — Z79.899 LONG-TERM CURRENT USE OF STIMULANT: ICD-10-CM

## 2023-11-02 DIAGNOSIS — E66.01 CLASS 3 SEVERE OBESITY DUE TO EXCESS CALORIES WITH SERIOUS COMORBIDITY AND BODY MASS INDEX (BMI) OF 40.0 TO 44.9 IN ADULT (HCC): Primary | ICD-10-CM

## 2023-11-02 RX ORDER — CLONAZEPAM 0.5 MG/1
TABLET ORAL
Qty: 90 TABLET | Refills: 0 | Status: SHIPPED | OUTPATIENT
Start: 2023-11-02 | End: 2023-12-02

## 2023-11-02 NOTE — PROGRESS NOTES
Chief Complaint   Patient presents with    ADHD     4M       SUBJECTIVE:    Jean-Paul Joya is a 48 y.o. female who is here today for a follow up appointment regarding current medical conditions including: Morbid obesity, essential hypertension, mixed hyperlipidemia, ADHD predominantly inattentive type, CONNER, and nicotine dependence. She states she is feeling relatively well overall. She denies any new or acute complaints at this time. The patient is currently taking lisinopril/HCTZ daily for management of her hypertension. She has been tolerating this well without adverse side effects. Her blood pressures have remained stable and in good control overall. She does not check her blood pressures at home regularly. She continues to take atorvastatin 20 mg daily for management of her mixed hyperlipidemia. She has been tolerating this well and taking as directed. Her cholesterol levels have remained stable and in good control overall. She denies any recent episodes of chest pain, chest pressure, shortness of breath, headaches, dizziness, blurred vision, palpitations, or syncope episodes. She continues to use clonazepam nightly for sleep issues secondary to anxiety. She states the medication is helpful and effective overall. She continues to be monitored and treated for her ADHD. She has been on methylphenidate 36 milligrams daily for several years now, and has tolerated the medication well. She states the medication has been helpful and improving her ability to focus her attention, decrease her distractibility, and she is able to complete tasks at work in a more timely manner. She denies any adverse side effects of the medication. She has followed up with me at appropriate intervals. She denies any misuse or abuse of the medication. Previous urine drug screens have been unremarkable. The patient continues to smoke despite repeated warnings and health risks.   She does have a chronic

## 2023-11-03 LAB
ALBUMIN SERPL-MCNC: 4.4 G/DL (ref 3.5–5)
ALBUMIN/GLOB SERPL: 1.4 (ref 1.1–2.2)
ALP SERPL-CCNC: 112 U/L (ref 45–117)
ALT SERPL-CCNC: 23 U/L (ref 12–78)
ANION GAP SERPL CALC-SCNC: 6 MMOL/L (ref 5–15)
AST SERPL-CCNC: 15 U/L (ref 15–37)
BILIRUB SERPL-MCNC: 0.5 MG/DL (ref 0.2–1)
BUN SERPL-MCNC: 9 MG/DL (ref 6–20)
BUN/CREAT SERPL: 11 (ref 12–20)
CALCIUM SERPL-MCNC: 9.9 MG/DL (ref 8.5–10.1)
CHLORIDE SERPL-SCNC: 105 MMOL/L (ref 97–108)
CHOLEST SERPL-MCNC: 187 MG/DL
CO2 SERPL-SCNC: 28 MMOL/L (ref 21–32)
CREAT SERPL-MCNC: 0.79 MG/DL (ref 0.55–1.02)
ERYTHROCYTE [DISTWIDTH] IN BLOOD BY AUTOMATED COUNT: 13.5 % (ref 11.5–14.5)
GLOBULIN SER CALC-MCNC: 3.2 G/DL (ref 2–4)
GLUCOSE SERPL-MCNC: 96 MG/DL (ref 65–100)
HCT VFR BLD AUTO: 48.4 % (ref 35–47)
HDLC SERPL-MCNC: 56 MG/DL
HDLC SERPL: 3.3 (ref 0–5)
HGB BLD-MCNC: 15.9 G/DL (ref 11.5–16)
LDLC SERPL CALC-MCNC: 90.6 MG/DL (ref 0–100)
MCH RBC QN AUTO: 29.2 PG (ref 26–34)
MCHC RBC AUTO-ENTMCNC: 32.9 G/DL (ref 30–36.5)
MCV RBC AUTO: 89 FL (ref 80–99)
NRBC # BLD: 0 K/UL (ref 0–0.01)
NRBC BLD-RTO: 0 PER 100 WBC
PLATELET # BLD AUTO: 265 K/UL (ref 150–400)
PMV BLD AUTO: 10.8 FL (ref 8.9–12.9)
POTASSIUM SERPL-SCNC: 3.8 MMOL/L (ref 3.5–5.1)
PROT SERPL-MCNC: 7.6 G/DL (ref 6.4–8.2)
RBC # BLD AUTO: 5.44 M/UL (ref 3.8–5.2)
SODIUM SERPL-SCNC: 139 MMOL/L (ref 136–145)
TRIGL SERPL-MCNC: 202 MG/DL
VLDLC SERPL CALC-MCNC: 40.4 MG/DL
WBC # BLD AUTO: 9.9 K/UL (ref 3.6–11)

## 2023-11-08 LAB — DRUGS UR: NORMAL

## 2023-11-09 DIAGNOSIS — F90.0 ATTENTION-DEFICIT HYPERACTIVITY DISORDER, PREDOMINANTLY INATTENTIVE TYPE: ICD-10-CM

## 2023-11-09 RX ORDER — METHYLPHENIDATE HYDROCHLORIDE 36 MG/1
36 TABLET, EXTENDED RELEASE ORAL EVERY MORNING
Qty: 30 TABLET | Refills: 0 | Status: SHIPPED | OUTPATIENT
Start: 2023-11-09 | End: 2023-11-10 | Stop reason: SDUPTHER

## 2023-11-09 NOTE — TELEPHONE ENCOUNTER
PCP: DAVID Glass NP    Last appt: 11/2/2023    Future Appointments   Date Time Provider [address]   3/4/2024  1:30 PM DAVID Glass NP [facility] AMB       Requested Prescriptions     Pending Prescriptions Disp Refills    methylphenidate 36 MG CR tablet 30 tablet 0     Sig: Take 1 tablet by mouth every morning for 30 days.  Max Daily Amount: 36 mg

## 2023-11-10 DIAGNOSIS — F90.0 ATTENTION-DEFICIT HYPERACTIVITY DISORDER, PREDOMINANTLY INATTENTIVE TYPE: ICD-10-CM

## 2023-11-10 RX ORDER — METHYLPHENIDATE HYDROCHLORIDE 36 MG/1
36 TABLET, EXTENDED RELEASE ORAL EVERY MORNING
Qty: 30 TABLET | Refills: 0 | Status: SHIPPED | OUTPATIENT
Start: 2023-11-10 | End: 2023-12-10

## 2023-11-10 NOTE — TELEPHONE ENCOUNTER
PCP: DAVID Cullen NP    Last appt: 11/2/2023    Future Appointments   Date Time Provider [address]  [address]   3/4/2024  1:30 PM DAVID Cullen NP [facility] AMB       Requested Prescriptions     Pending Prescriptions Disp Refills    methylphenidate 36 MG CR tablet 30 tablet 0     Sig: Take 1 tablet by mouth every morning for 30 days.  Max Daily Amount: 36 mg

## 2023-12-08 DIAGNOSIS — F90.0 ATTENTION-DEFICIT HYPERACTIVITY DISORDER, PREDOMINANTLY INATTENTIVE TYPE: ICD-10-CM

## 2023-12-08 RX ORDER — METHYLPHENIDATE HYDROCHLORIDE 36 MG/1
36 TABLET, EXTENDED RELEASE ORAL EVERY MORNING
Qty: 30 TABLET | Refills: 0 | Status: SHIPPED | OUTPATIENT
Start: 2023-12-08 | End: 2024-01-07

## 2023-12-08 NOTE — TELEPHONE ENCOUNTER
PCP: DAVID Yang NP    Last appt: 11/2/2023    Future Appointments   Date Time Provider [address]   3/4/2024  1:30 PM DAVID Yang NP [facility] AMB       Requested Prescriptions     Pending Prescriptions Disp Refills    methylphenidate 36 MG CR tablet 30 tablet 0     Sig: Take 1 tablet by mouth every morning for 30 days.  Max Daily Amount: 36 mg

## 2024-01-05 DIAGNOSIS — F90.0 ATTENTION-DEFICIT HYPERACTIVITY DISORDER, PREDOMINANTLY INATTENTIVE TYPE: ICD-10-CM

## 2024-01-05 RX ORDER — METHYLPHENIDATE HYDROCHLORIDE 36 MG/1
36 TABLET, EXTENDED RELEASE ORAL EVERY MORNING
Qty: 30 TABLET | Refills: 0 | Status: SHIPPED | OUTPATIENT
Start: 2024-01-05 | End: 2024-02-04

## 2024-01-05 NOTE — TELEPHONE ENCOUNTER
PCP: Obed Garcia APRN - NP    Last appt: 11/2/2023    Future Appointments   Date Time Provider Department Center   3/4/2024  1:30 PM Obed Garcia APRN - NP [facility] AMB       Requested Prescriptions     Pending Prescriptions Disp Refills    methylphenidate 36 MG CR tablet 30 tablet 0     Sig: Take 1 tablet by mouth every morning for 30 days. Max Daily Amount: 36 mg

## 2024-01-11 DIAGNOSIS — M62.830 MUSCLE SPASM OF BACK: ICD-10-CM

## 2024-01-11 RX ORDER — METHOCARBAMOL 500 MG/1
TABLET, FILM COATED ORAL
Qty: 90 TABLET | Refills: 1 | Status: SHIPPED | OUTPATIENT
Start: 2024-01-11

## 2024-01-11 NOTE — TELEPHONE ENCOUNTER
PCP: Obed Garcia APRN - NP    Last appt: 11/2/2023    Future Appointments   Date Time Provider Department Center   3/4/2024  1:30 PM Obed Garcia APRN - NP [facility] AMB       Requested Prescriptions     Pending Prescriptions Disp Refills    methocarbamol (ROBAXIN) 500 MG tablet [Pharmacy Med Name: METHOCARBAMOL 500 MG TABLET] 90 tablet 1     Sig: TAKE 1 TABLET BY MOUTH THREE TIMES A DAY AS NEEDED FOR MUSCLE SPASM

## 2024-02-02 DIAGNOSIS — F90.0 ATTENTION-DEFICIT HYPERACTIVITY DISORDER, PREDOMINANTLY INATTENTIVE TYPE: ICD-10-CM

## 2024-02-02 RX ORDER — METHYLPHENIDATE HYDROCHLORIDE 36 MG/1
36 TABLET, EXTENDED RELEASE ORAL EVERY MORNING
Qty: 30 TABLET | Refills: 0 | Status: SHIPPED | OUTPATIENT
Start: 2024-02-02 | End: 2024-03-03

## 2024-02-02 NOTE — TELEPHONE ENCOUNTER
Last Refill: 1-5-24  Last Visit: 11/2/2023   Next Visit: 3/4/2024     Requested Prescriptions     Pending Prescriptions Disp Refills    methylphenidate 36 MG CR tablet 30 tablet 0     Sig: Take 1 tablet by mouth every morning for 30 days. Max Daily Amount: 36 mg

## 2024-03-01 DIAGNOSIS — F90.0 ATTENTION-DEFICIT HYPERACTIVITY DISORDER, PREDOMINANTLY INATTENTIVE TYPE: ICD-10-CM

## 2024-03-01 RX ORDER — METHYLPHENIDATE HYDROCHLORIDE 36 MG/1
36 TABLET, EXTENDED RELEASE ORAL EVERY MORNING
Qty: 30 TABLET | Refills: 0 | Status: SHIPPED | OUTPATIENT
Start: 2024-03-01 | End: 2024-03-31

## 2024-03-04 ENCOUNTER — OFFICE VISIT (OUTPATIENT)
Facility: CLINIC | Age: 54
End: 2024-03-04
Payer: COMMERCIAL

## 2024-03-04 VITALS
WEIGHT: 227.2 LBS | HEART RATE: 94 BPM | TEMPERATURE: 97.8 F | OXYGEN SATURATION: 98 % | RESPIRATION RATE: 16 BRPM | HEIGHT: 62 IN | SYSTOLIC BLOOD PRESSURE: 122 MMHG | DIASTOLIC BLOOD PRESSURE: 72 MMHG | BODY MASS INDEX: 41.81 KG/M2

## 2024-03-04 DIAGNOSIS — I10 ESSENTIAL HYPERTENSION: ICD-10-CM

## 2024-03-04 DIAGNOSIS — F41.1 GAD (GENERALIZED ANXIETY DISORDER): ICD-10-CM

## 2024-03-04 DIAGNOSIS — Z00.00 ENCOUNTER FOR PHYSICAL EXAMINATION: Primary | ICD-10-CM

## 2024-03-04 DIAGNOSIS — F90.0 ATTENTION-DEFICIT HYPERACTIVITY DISORDER, PREDOMINANTLY INATTENTIVE TYPE: ICD-10-CM

## 2024-03-04 DIAGNOSIS — Z71.6 ENCOUNTER FOR TOBACCO USE CESSATION COUNSELING: ICD-10-CM

## 2024-03-04 DIAGNOSIS — E66.01 CLASS 3 SEVERE OBESITY DUE TO EXCESS CALORIES WITH SERIOUS COMORBIDITY AND BODY MASS INDEX (BMI) OF 40.0 TO 44.9 IN ADULT (HCC): ICD-10-CM

## 2024-03-04 DIAGNOSIS — E78.2 MIXED HYPERLIPIDEMIA: ICD-10-CM

## 2024-03-04 DIAGNOSIS — F17.210 CIGARETTE NICOTINE DEPENDENCE WITHOUT COMPLICATION: ICD-10-CM

## 2024-03-04 DIAGNOSIS — H69.92 DYSFUNCTION OF LEFT EUSTACHIAN TUBE: ICD-10-CM

## 2024-03-04 PROCEDURE — 3074F SYST BP LT 130 MM HG: CPT | Performed by: NURSE PRACTITIONER

## 2024-03-04 PROCEDURE — 3078F DIAST BP <80 MM HG: CPT | Performed by: NURSE PRACTITIONER

## 2024-03-04 PROCEDURE — 99396 PREV VISIT EST AGE 40-64: CPT | Performed by: NURSE PRACTITIONER

## 2024-03-04 RX ORDER — PREDNISONE 5 MG/1
TABLET ORAL
Qty: 1 EACH | Refills: 0 | Status: SHIPPED | OUTPATIENT
Start: 2024-03-04

## 2024-03-04 RX ORDER — CLONAZEPAM 0.5 MG/1
TABLET ORAL
Qty: 90 TABLET | Refills: 0 | Status: SHIPPED | OUTPATIENT
Start: 2024-03-04 | End: 2024-04-03

## 2024-03-04 ASSESSMENT — PATIENT HEALTH QUESTIONNAIRE - PHQ9
SUM OF ALL RESPONSES TO PHQ QUESTIONS 1-9: 0
SUM OF ALL RESPONSES TO PHQ QUESTIONS 1-9: 0
1. LITTLE INTEREST OR PLEASURE IN DOING THINGS: 0
SUM OF ALL RESPONSES TO PHQ9 QUESTIONS 1 & 2: 0
SUM OF ALL RESPONSES TO PHQ QUESTIONS 1-9: 0
2. FEELING DOWN, DEPRESSED OR HOPELESS: 0
SUM OF ALL RESPONSES TO PHQ QUESTIONS 1-9: 0

## 2024-03-04 NOTE — PROGRESS NOTES
Aye Herrmann is a 53 y.o. female     Chief Complaint   Patient presents with    Annual Exam       /72 (Site: Left Upper Arm, Position: Sitting, Cuff Size: Large Adult)   Pulse 94   Temp 97.8 °F (36.6 °C) (Oral)   Resp 16   Ht 1.575 m (5' 2\")   Wt 103.1 kg (227 lb 3.2 oz)   SpO2 98%   BMI 41.56 kg/m²     Health Maintenance Due   Topic Date Due    Hepatitis B vaccine (1 of 3 - 3-dose series) Never done    Pneumococcal 0-64 years Vaccine (1 - PCV) Never done    Cervical cancer screen  Never done    Colorectal Cancer Screen  Never done    Breast cancer screen  Never done    Shingles vaccine (1 of 2) Never done    Low dose CT lung screening &/or counseling  Never done    Flu vaccine (1) 08/01/2023    COVID-19 Vaccine (4 - 2023-24 season) 09/01/2023    Depression Screen  02/08/2024         \"Have you been to the ER, urgent care clinic since your last visit?  Hospitalized since your last visit?\"    NO    “Have you seen or consulted any other health care providers outside of [facility] since your last visit?”    NO    “Have you had a colorectal cancer screening such as a colonoscopy/FIT/Cologuard?    NO     Have you had a mammogram?”   NO     “Have you had a pap smear?”    NO                  
the Last Year: No     Family History   Problem Relation Age of Onset    Tremors Sister        OBJECTIVE:     /72 (Site: Left Upper Arm, Position: Sitting, Cuff Size: Large Adult)   Pulse 94   Temp 97.8 °F (36.6 °C) (Oral)   Resp 16   Ht 1.575 m (5' 2\")   Wt 103.1 kg (227 lb 3.2 oz)   SpO2 98%   BMI 41.56 kg/m²     Constitutional: She appears well nourished, of stated age, and dressed appropriately.  Eyes: Sclera anicteric, PERRLA, EOMI  Ears: Canals clear, TM normal and intact to right side.  TM cone of light to left side abnormal with presence of air/fluid observed.  No redness.  Neck: Supple without lymphadenopathy. Thyroid normal, No JVD or bruits  Respiratory: Clear to ascultation X5, normal inspiratory effort, no adventitious breath sounds.  Cardiovascular: Regular rate and rhythm, no rubs or gallops, PMI not displaced, No thrills, no peripheral edema  Gastrointestinal: Abdomen non-distended, soft, non-tender, bowel sounds normal and active X4.  Hematologic: No purpura, petechiae or unexplained bruising  Lymphatic: No lymph node enlargemant.  Integumentary: No unusual rashes or suspicious skin lesions noted.   Neuro: Non-focal exam, A & O X 3.   Psychiatric: Appropriate affect and demeanor, pleasant and cooperative. Patient's thought content and thought processing appear to be within normal limits.    ASSESSMENT/PLAN:      Diagnosis Orders   1. Encounter for physical examination        2. Class 3 severe obesity due to excess calories with serious comorbidity and body mass index (BMI) of 40.0 to 44.9 in adult (HCC)        3. Essential hypertension  CBC    Comprehensive Metabolic Panel    TSH    Urinalysis    Urinalysis    TSH    Comprehensive Metabolic Panel    CBC      4. Mixed hyperlipidemia  Lipid Panel    Lipid Panel      5. Attention-deficit hyperactivity disorder, predominantly inattentive type        6. CONNER (generalized anxiety disorder)  clonazePAM (KLONOPIN) 0.5 MG tablet      7.

## 2024-03-05 LAB
ALBUMIN SERPL-MCNC: 4.4 G/DL (ref 3.5–5)
ALBUMIN/GLOB SERPL: 1.5 (ref 1.1–2.2)
ALP SERPL-CCNC: 117 U/L (ref 45–117)
ALT SERPL-CCNC: 22 U/L (ref 12–78)
ANION GAP SERPL CALC-SCNC: 10 MMOL/L (ref 5–15)
APPEARANCE UR: CLEAR
AST SERPL-CCNC: 17 U/L (ref 15–37)
BILIRUB SERPL-MCNC: 0.3 MG/DL (ref 0.2–1)
BILIRUB UR QL: NEGATIVE
BUN SERPL-MCNC: 7 MG/DL (ref 6–20)
BUN/CREAT SERPL: 7 (ref 12–20)
CALCIUM SERPL-MCNC: 10 MG/DL (ref 8.5–10.1)
CHLORIDE SERPL-SCNC: 106 MMOL/L (ref 97–108)
CHOLEST SERPL-MCNC: 194 MG/DL
CO2 SERPL-SCNC: 23 MMOL/L (ref 21–32)
COLOR UR: NORMAL
CREAT SERPL-MCNC: 0.94 MG/DL (ref 0.55–1.02)
ERYTHROCYTE [DISTWIDTH] IN BLOOD BY AUTOMATED COUNT: 13.7 % (ref 11.5–14.5)
GLOBULIN SER CALC-MCNC: 3 G/DL (ref 2–4)
GLUCOSE SERPL-MCNC: 99 MG/DL (ref 65–100)
GLUCOSE UR STRIP.AUTO-MCNC: NEGATIVE MG/DL
HCT VFR BLD AUTO: 48.8 % (ref 35–47)
HDLC SERPL-MCNC: 61 MG/DL
HDLC SERPL: 3.2 (ref 0–5)
HGB BLD-MCNC: 16.7 G/DL (ref 11.5–16)
HGB UR QL STRIP: NEGATIVE
KETONES UR QL STRIP.AUTO: NEGATIVE MG/DL
LDLC SERPL CALC-MCNC: 102.6 MG/DL (ref 0–100)
LEUKOCYTE ESTERASE UR QL STRIP.AUTO: NEGATIVE
MCH RBC QN AUTO: 30 PG (ref 26–34)
MCHC RBC AUTO-ENTMCNC: 34.2 G/DL (ref 30–36.5)
MCV RBC AUTO: 87.6 FL (ref 80–99)
NITRITE UR QL STRIP.AUTO: NEGATIVE
NRBC # BLD: 0 K/UL (ref 0–0.01)
NRBC BLD-RTO: 0 PER 100 WBC
PH UR STRIP: 6.5 (ref 5–8)
PLATELET # BLD AUTO: 259 K/UL (ref 150–400)
PMV BLD AUTO: 10.3 FL (ref 8.9–12.9)
POTASSIUM SERPL-SCNC: 3.7 MMOL/L (ref 3.5–5.1)
PROT SERPL-MCNC: 7.4 G/DL (ref 6.4–8.2)
PROT UR STRIP-MCNC: NEGATIVE MG/DL
RBC # BLD AUTO: 5.57 M/UL (ref 3.8–5.2)
SODIUM SERPL-SCNC: 139 MMOL/L (ref 136–145)
SP GR UR REFRACTOMETRY: <1.005 (ref 1–1.03)
TRIGL SERPL-MCNC: 152 MG/DL
TSH SERPL DL<=0.05 MIU/L-ACNC: 1.24 UIU/ML (ref 0.36–3.74)
UROBILINOGEN UR QL STRIP.AUTO: 0.2 EU/DL (ref 0.2–1)
VLDLC SERPL CALC-MCNC: 30.4 MG/DL
WBC # BLD AUTO: 10 K/UL (ref 3.6–11)

## 2024-03-08 DIAGNOSIS — M62.830 MUSCLE SPASM OF BACK: ICD-10-CM

## 2024-03-08 RX ORDER — METHOCARBAMOL 500 MG/1
TABLET, FILM COATED ORAL
Qty: 90 TABLET | Refills: 1 | Status: SHIPPED | OUTPATIENT
Start: 2024-03-08

## 2024-03-08 NOTE — TELEPHONE ENCOUNTER
PCP: Obed Garcia APRN - NP    Last appt: 3/4/2024    Future Appointments   Date Time Provider Department Center   7/5/2024  1:30 PM Obed Garcia APRN - NP [facility] AMB       Requested Prescriptions     Pending Prescriptions Disp Refills    methocarbamol (ROBAXIN) 500 MG tablet [Pharmacy Med Name: METHOCARBAMOL 500 MG TABLET] 90 tablet 1     Sig: TAKE 1 TABLET BY MOUTH THREE TIMES A DAY AS NEEDED FOR MUSCLE SPASM

## 2024-03-29 DIAGNOSIS — F90.0 ATTENTION-DEFICIT HYPERACTIVITY DISORDER, PREDOMINANTLY INATTENTIVE TYPE: ICD-10-CM

## 2024-03-29 RX ORDER — METHYLPHENIDATE HYDROCHLORIDE 36 MG/1
36 TABLET, EXTENDED RELEASE ORAL EVERY MORNING
Qty: 30 TABLET | Refills: 0 | Status: SHIPPED | OUTPATIENT
Start: 2024-03-29 | End: 2024-04-28

## 2024-03-29 NOTE — TELEPHONE ENCOUNTER
PCP: Obed Garcia APRN - NP    Last appt: 3/4/2024    Future Appointments   Date Time Provider Department Center   7/5/2024  1:30 PM Obed Garcia APRN - NP [facility] AMB       Requested Prescriptions     Pending Prescriptions Disp Refills    methylphenidate 36 MG CR tablet 30 tablet 0     Sig: Take 1 tablet by mouth every morning for 30 days. Max Daily Amount: 36 mg

## 2024-04-22 RX ORDER — ATORVASTATIN CALCIUM 20 MG/1
20 TABLET, FILM COATED ORAL DAILY
Qty: 90 TABLET | Refills: 1 | Status: SHIPPED | OUTPATIENT
Start: 2024-04-22

## 2024-04-22 NOTE — TELEPHONE ENCOUNTER
PCP: Obed Garcia APRN - NP    Last appt: 3/4/2024    Future Appointments   Date Time Provider Department Center   7/5/2024  1:30 PM Obed Garcia APRN - NP [facility] BS AMB       Requested Prescriptions     Pending Prescriptions Disp Refills    atorvastatin (LIPITOR) 20 MG tablet [Pharmacy Med Name: ATORVASTATIN 20 MG TABLET] 90 tablet 1     Sig: TAKE 1 TABLET BY MOUTH EVERY DAY

## 2024-04-26 DIAGNOSIS — F90.0 ATTENTION-DEFICIT HYPERACTIVITY DISORDER, PREDOMINANTLY INATTENTIVE TYPE: ICD-10-CM

## 2024-04-26 DIAGNOSIS — J30.89 OTHER ALLERGIC RHINITIS: ICD-10-CM

## 2024-04-26 RX ORDER — METHYLPHENIDATE HYDROCHLORIDE 36 MG/1
36 TABLET, EXTENDED RELEASE ORAL EVERY MORNING
Qty: 30 TABLET | Refills: 0 | Status: SHIPPED | OUTPATIENT
Start: 2024-04-26 | End: 2024-05-26

## 2024-04-26 RX ORDER — ALBUTEROL SULFATE 90 UG/1
2 AEROSOL, METERED RESPIRATORY (INHALATION) EVERY 4 HOURS PRN
Qty: 8.5 EACH | Refills: 6 | Status: SHIPPED | OUTPATIENT
Start: 2024-04-26

## 2024-04-26 RX ORDER — AZELASTINE HYDROCHLORIDE, FLUTICASONE PROPIONATE 137; 50 UG/1; UG/1
SPRAY, METERED NASAL
Qty: 23 EACH | Refills: 5 | Status: SHIPPED | OUTPATIENT
Start: 2024-04-26

## 2024-04-26 NOTE — TELEPHONE ENCOUNTER
PCP: Obed Garcia APRN - NP    Last appt: 3/4/2024    Future Appointments   Date Time Provider Department Center   7/5/2024  1:30 PM Obed Garcia APRN - NP [facility] BS AMB       Requested Prescriptions     Pending Prescriptions Disp Refills    albuterol sulfate HFA (PROVENTIL;VENTOLIN;PROAIR) 108 (90 Base) MCG/ACT inhaler [Pharmacy Med Name: ALBUTEROL HFA (PROAIR) INHALER] 8.5 each 6     Sig: INHALE 2 PUFFS BY MOUTH EVERY 4 HOURS AS NEEDED FOR WHEEZING

## 2024-04-26 NOTE — TELEPHONE ENCOUNTER
PCP: Obed Garcia APRN - NP    Last appt: 3/4/2024    Future Appointments   Date Time Provider Department Center   7/5/2024  1:30 PM Obed Garcia APRN - NP [facility] BS AMB       Requested Prescriptions     Pending Prescriptions Disp Refills    Azelastine-Fluticasone 137-50 MCG/ACT SUSP [Pharmacy Med Name: AZELASTIN-FLUTIC 137-50MCG SPR] 23 each 1     Sig: SPRAY 1 SPRAY INTO EACH NOSTRIL EVERY DAY

## 2024-05-09 DIAGNOSIS — M62.830 MUSCLE SPASM OF BACK: ICD-10-CM

## 2024-05-09 RX ORDER — METHOCARBAMOL 500 MG/1
TABLET, FILM COATED ORAL
Qty: 90 TABLET | Refills: 1 | Status: SHIPPED | OUTPATIENT
Start: 2024-05-09

## 2024-05-30 DIAGNOSIS — F90.0 ATTENTION-DEFICIT HYPERACTIVITY DISORDER, PREDOMINANTLY INATTENTIVE TYPE: ICD-10-CM

## 2024-05-30 RX ORDER — METHYLPHENIDATE HYDROCHLORIDE 36 MG/1
36 TABLET, EXTENDED RELEASE ORAL EVERY MORNING
Qty: 30 TABLET | Refills: 0 | Status: SHIPPED | OUTPATIENT
Start: 2024-05-30 | End: 2024-06-29

## 2024-06-12 RX ORDER — PRIMIDONE 50 MG/1
TABLET ORAL
Qty: 90 TABLET | Refills: 1 | Status: SHIPPED | OUTPATIENT
Start: 2024-06-12

## 2024-06-12 RX ORDER — LISINOPRIL AND HYDROCHLOROTHIAZIDE 12.5; 1 MG/1; MG/1
TABLET ORAL
Qty: 90 TABLET | Refills: 1 | Status: SHIPPED | OUTPATIENT
Start: 2024-06-12

## 2024-06-12 NOTE — TELEPHONE ENCOUNTER
PCP: Obed Garcia APRN - NP    Last appt: 3/4/2024    Future Appointments   Date Time Provider Department Center   7/5/2024  1:30 PM Obed Garcia APRN - NP [facility] BS AMB       Requested Prescriptions     Pending Prescriptions Disp Refills    primidone (MYSOLINE) 50 MG tablet [Pharmacy Med Name: PRIMIDONE 50 MG TABLET] 90 tablet 1     Sig: TAKE 1 TABLET BY MOUTH EVERY DAY    lisinopril-hydroCHLOROthiazide (PRINZIDE;ZESTORETIC) 10-12.5 MG per tablet [Pharmacy Med Name: LISINOPRIL-HCTZ 10-12.5 MG TAB] 90 tablet 1     Sig: TAKE 1 TABLET BY MOUTH EVERY DAY

## 2024-06-18 DIAGNOSIS — F41.1 GAD (GENERALIZED ANXIETY DISORDER): ICD-10-CM

## 2024-06-18 RX ORDER — CLONAZEPAM 0.5 MG/1
TABLET ORAL
Qty: 90 TABLET | Refills: 0 | Status: SHIPPED | OUTPATIENT
Start: 2024-06-18 | End: 2024-07-18

## 2024-06-18 NOTE — TELEPHONE ENCOUNTER
PCP: Obed Garcia APRN - NP    Last appt: 3/4/2024    Future Appointments   Date Time Provider Department Center   7/5/2024  1:30 PM Obed Garcia APRN - NP [facility] BS AMB       Requested Prescriptions     Pending Prescriptions Disp Refills    clonazePAM (KLONOPIN) 0.5 MG tablet 90 tablet 0     Sig: TAKE 1 TABLET BY MOUTH EVERY DAY AT BEDTIME AS NEEDED FOR ANXIETY

## 2024-07-01 DIAGNOSIS — F90.0 ATTENTION-DEFICIT HYPERACTIVITY DISORDER, PREDOMINANTLY INATTENTIVE TYPE: ICD-10-CM

## 2024-07-02 RX ORDER — METHYLPHENIDATE HYDROCHLORIDE 36 MG/1
36 TABLET, EXTENDED RELEASE ORAL EVERY MORNING
Qty: 30 TABLET | Refills: 0 | Status: SHIPPED | OUTPATIENT
Start: 2024-07-02 | End: 2024-08-01

## 2024-07-02 SDOH — ECONOMIC STABILITY: FOOD INSECURITY: WITHIN THE PAST 12 MONTHS, YOU WORRIED THAT YOUR FOOD WOULD RUN OUT BEFORE YOU GOT MONEY TO BUY MORE.: NEVER TRUE

## 2024-07-02 SDOH — ECONOMIC STABILITY: INCOME INSECURITY: HOW HARD IS IT FOR YOU TO PAY FOR THE VERY BASICS LIKE FOOD, HOUSING, MEDICAL CARE, AND HEATING?: NOT VERY HARD

## 2024-07-02 SDOH — ECONOMIC STABILITY: FOOD INSECURITY: WITHIN THE PAST 12 MONTHS, THE FOOD YOU BOUGHT JUST DIDN'T LAST AND YOU DIDN'T HAVE MONEY TO GET MORE.: NEVER TRUE

## 2024-07-05 ENCOUNTER — OFFICE VISIT (OUTPATIENT)
Facility: CLINIC | Age: 54
End: 2024-07-05

## 2024-07-05 ENCOUNTER — TELEPHONE (OUTPATIENT)
Facility: CLINIC | Age: 54
End: 2024-07-05

## 2024-07-05 VITALS
HEART RATE: 125 BPM | DIASTOLIC BLOOD PRESSURE: 78 MMHG | HEIGHT: 62 IN | WEIGHT: 229.8 LBS | RESPIRATION RATE: 16 BRPM | SYSTOLIC BLOOD PRESSURE: 124 MMHG | TEMPERATURE: 98.6 F | OXYGEN SATURATION: 96 % | BODY MASS INDEX: 42.29 KG/M2

## 2024-07-05 DIAGNOSIS — F90.0 ATTENTION-DEFICIT HYPERACTIVITY DISORDER, PREDOMINANTLY INATTENTIVE TYPE: Primary | ICD-10-CM

## 2024-07-05 DIAGNOSIS — E78.2 MIXED HYPERLIPIDEMIA: ICD-10-CM

## 2024-07-05 DIAGNOSIS — I10 ESSENTIAL HYPERTENSION: ICD-10-CM

## 2024-07-05 DIAGNOSIS — A08.4 VIRAL GASTROENTERITIS: ICD-10-CM

## 2024-07-05 RX ORDER — OLOPATADINE HYDROCHLORIDE 1 MG/ML
1 SOLUTION/ DROPS OPHTHALMIC 2 TIMES DAILY
COMMUNITY

## 2024-07-05 SDOH — ECONOMIC STABILITY: FOOD INSECURITY: WITHIN THE PAST 12 MONTHS, THE FOOD YOU BOUGHT JUST DIDN'T LAST AND YOU DIDN'T HAVE MONEY TO GET MORE.: NEVER TRUE

## 2024-07-05 SDOH — ECONOMIC STABILITY: INCOME INSECURITY: HOW HARD IS IT FOR YOU TO PAY FOR THE VERY BASICS LIKE FOOD, HOUSING, MEDICAL CARE, AND HEATING?: NOT HARD AT ALL

## 2024-07-05 SDOH — ECONOMIC STABILITY: FOOD INSECURITY: WITHIN THE PAST 12 MONTHS, YOU WORRIED THAT YOUR FOOD WOULD RUN OUT BEFORE YOU GOT MONEY TO BUY MORE.: NEVER TRUE

## 2024-07-05 NOTE — TELEPHONE ENCOUNTER
Spoke with patient to schedule mammogram ordered by Obed Garcia APRN - NP on 11/02/23(expires:1/2/25). Pt scheduled at [facility] 8/5/24 at 1:40 PM. Pt verbalized understanding.

## 2024-07-05 NOTE — PROGRESS NOTES
Aye Herrmann is a 54 y.o. female     Chief Complaint   Patient presents with    Hypertension     Diarrhea x 3 days  Left shoulder pain       /78 (Site: Left Upper Arm, Position: Sitting, Cuff Size: Medium Adult)   Pulse (!) 125   Temp 98.6 °F (37 °C) (Oral)   Resp 16   Ht 1.575 m (5' 2\")   Wt 104.2 kg (229 lb 12.8 oz)   SpO2 96%   BMI 42.03 kg/m²     Health Maintenance Due   Topic Date Due    Hepatitis B vaccine (1 of 3 - 3-dose series) Never done    Pneumococcal 0-64 years Vaccine (1 of 2 - PCV) Never done    Cervical cancer screen  Never done    Breast cancer screen  Never done    Colorectal Cancer Screen  Never done    Shingles vaccine (1 of 2) Never done    Low dose CT lung screening &/or counseling  Never done    COVID-19 Vaccine (4 - 2023-24 season) 09/01/2023         \"Have you been to the ER, urgent care clinic since your last visit?  Hospitalized since your last visit?\"    NO    “Have you seen or consulted any other health care providers outside of [facility] since your last visit?”    NO    “Have you had a colorectal cancer screening such as a colonoscopy/FIT/Cologuard?    NO    No colonoscopy on file  No cologuard on file  No FIT/FOBT on file   No flexible sigmoidoscopy on file        Have you had a mammogram?”   NO    No breast cancer screening on file      “Have you had a pap smear?”    NO    No cervical cancer screening on file                   
proofread, it may and can contain electronic and spelling errors.  Other human spelling and other errors may be present.  Corrections may be executed at a later time.  Please feel free to contact us for any clarifications as needed.    Signed,  Obed Garcia, MSN APRN FNP-BC

## 2024-07-10 DIAGNOSIS — M62.830 MUSCLE SPASM OF BACK: ICD-10-CM

## 2024-07-10 RX ORDER — METHOCARBAMOL 500 MG/1
TABLET, FILM COATED ORAL
Qty: 90 TABLET | Refills: 1 | Status: SHIPPED | OUTPATIENT
Start: 2024-07-10

## 2024-07-10 NOTE — TELEPHONE ENCOUNTER
PCP: Obed Garcia APRN - NP    Last appt: 7/5/2024    Future Appointments   Date Time Provider Department Center   8/5/2024  1:40 PM [facility] 3 [facility]   11/11/2024  8:20 AM LAB ONLY [facility] BS AMB   11/15/2024 10:30 AM Obed Garcia APRN - NP [facility] BS [facility]       Requested Prescriptions     Pending Prescriptions Disp Refills    methocarbamol (ROBAXIN) 500 MG tablet [Pharmacy Med Name: METHOCARBAMOL 500 MG TABLET] 90 tablet 1     Sig: TAKE 1 TABLET BY MOUTH THREE TIMES A DAY AS NEEDED FOR MUSCLE SPASM

## 2024-07-29 DIAGNOSIS — F90.0 ATTENTION-DEFICIT HYPERACTIVITY DISORDER, PREDOMINANTLY INATTENTIVE TYPE: ICD-10-CM

## 2024-07-29 RX ORDER — METHYLPHENIDATE HYDROCHLORIDE 36 MG/1
36 TABLET, EXTENDED RELEASE ORAL EVERY MORNING
Qty: 30 TABLET | Refills: 0 | Status: SHIPPED | OUTPATIENT
Start: 2024-07-29 | End: 2024-08-28

## 2024-07-29 NOTE — TELEPHONE ENCOUNTER
PCP: Obed Garcia APRN - NP    Last appt: 7/5/2024    Future Appointments   Date Time Provider Department Center   8/5/2024  1:40 PM [facility] 3 [facility]   11/11/2024  8:20 AM LAB ONLY [facility] BS AMB   11/15/2024 10:30 AM Obed Garcia APRN - NP [facility] BS AMB       Requested Prescriptions     Pending Prescriptions Disp Refills    methylphenidate 36 MG CR tablet 30 tablet 0     Sig: Take 1 tablet by mouth every morning for 30 days. Max Daily Amount: 36 mg

## 2024-08-05 ENCOUNTER — HOSPITAL ENCOUNTER (OUTPATIENT)
Facility: HOSPITAL | Age: 54
Discharge: HOME | End: 2024-08-08
Payer: COMMERCIAL

## 2024-08-05 VITALS — WEIGHT: 229 LBS | BODY MASS INDEX: 42.14 KG/M2 | HEIGHT: 62 IN

## 2024-08-05 DIAGNOSIS — Z12.31 ENCOUNTER FOR SCREENING MAMMOGRAM FOR MALIGNANT NEOPLASM OF BREAST: ICD-10-CM

## 2024-08-05 PROCEDURE — 77067 SCR MAMMO BI INCL CAD: CPT

## 2024-08-29 DIAGNOSIS — F90.0 ATTENTION-DEFICIT HYPERACTIVITY DISORDER, PREDOMINANTLY INATTENTIVE TYPE: ICD-10-CM

## 2024-08-29 RX ORDER — METHYLPHENIDATE HYDROCHLORIDE 36 MG/1
36 TABLET, EXTENDED RELEASE ORAL EVERY MORNING
Qty: 30 TABLET | Refills: 0 | Status: SHIPPED | OUTPATIENT
Start: 2024-08-29 | End: 2024-09-28

## 2024-08-29 NOTE — TELEPHONE ENCOUNTER
PCP: Obed Garcia APRN - NP    Last appt: 7/5/2024    Future Appointments   Date Time Provider Department Center   11/11/2024  8:20 AM [facility]   11/15/2024 10:30 AM Obed Garcia APRN - NP [facility] DEP       Requested Prescriptions     Pending Prescriptions Disp Refills    methylphenidate 36 MG CR tablet 30 tablet 0     Sig: Take 1 tablet by mouth every morning for 30 days. Max Daily Amount: 36 mg

## 2024-09-04 DIAGNOSIS — M62.830 MUSCLE SPASM OF BACK: ICD-10-CM

## 2024-09-04 RX ORDER — METHOCARBAMOL 500 MG/1
TABLET, FILM COATED ORAL
Qty: 90 TABLET | Refills: 1 | Status: SHIPPED | OUTPATIENT
Start: 2024-09-04

## 2024-09-04 NOTE — TELEPHONE ENCOUNTER
PCP: Obed Garcia APRN - NP    Last appt: 7/5/2024    Future Appointments   Date Time Provider Department Center   11/11/2024  8:20 AM LAB ONLY [facility]   11/15/2024 10:30 AM Obed Garcia APRN - NP [facility] DEP       Requested Prescriptions     Pending Prescriptions Disp Refills    methocarbamol (ROBAXIN) 500 MG tablet [Pharmacy Med Name: METHOCARBAMOL 500 MG TABLET] 90 tablet 1     Sig: TAKE 1 TABLET BY MOUTH THREE TIMES A DAY AS NEEDED FOR MUSCLE SPASM

## 2024-09-18 DIAGNOSIS — F41.1 GAD (GENERALIZED ANXIETY DISORDER): ICD-10-CM

## 2024-09-18 RX ORDER — CLONAZEPAM 0.5 MG/1
TABLET ORAL
Qty: 90 TABLET | Refills: 0 | Status: SHIPPED | OUTPATIENT
Start: 2024-09-18 | End: 2024-12-17

## 2024-09-26 DIAGNOSIS — F90.0 ATTENTION-DEFICIT HYPERACTIVITY DISORDER, PREDOMINANTLY INATTENTIVE TYPE: ICD-10-CM

## 2024-09-27 RX ORDER — METHYLPHENIDATE HYDROCHLORIDE 36 MG/1
36 TABLET, EXTENDED RELEASE ORAL EVERY MORNING
Qty: 30 TABLET | Refills: 0 | Status: SHIPPED | OUTPATIENT
Start: 2024-09-27 | End: 2024-10-27

## 2024-10-21 DIAGNOSIS — M62.830 MUSCLE SPASM OF BACK: ICD-10-CM

## 2024-10-21 DIAGNOSIS — F90.0 ATTENTION-DEFICIT HYPERACTIVITY DISORDER, PREDOMINANTLY INATTENTIVE TYPE: ICD-10-CM

## 2024-10-21 RX ORDER — ATORVASTATIN CALCIUM 20 MG/1
20 TABLET, FILM COATED ORAL DAILY
Qty: 90 TABLET | Refills: 1 | Status: SHIPPED | OUTPATIENT
Start: 2024-10-21

## 2024-10-21 RX ORDER — METHOCARBAMOL 500 MG/1
TABLET, FILM COATED ORAL
Qty: 90 TABLET | Refills: 1 | Status: SHIPPED | OUTPATIENT
Start: 2024-10-21

## 2024-10-21 RX ORDER — METHYLPHENIDATE HYDROCHLORIDE 36 MG/1
36 TABLET, EXTENDED RELEASE ORAL EVERY MORNING
Qty: 30 TABLET | Refills: 0 | Status: SHIPPED | OUTPATIENT
Start: 2024-10-21 | End: 2024-11-20

## 2024-10-21 NOTE — TELEPHONE ENCOUNTER
PCP: Obed Garcia APRN - NP    Last appt: 7/5/2024    Future Appointments   Date Time Provider Department Center   11[facility]2024  8[facility]   11/15/2024 10:30 AM Obed Garcia APRN - NP [facility] DEP       Requested Prescriptions     Pending Prescriptions Disp Refills    methylphenidate 36 MG CR tablet 30 tablet 0     Sig: Take 1 tablet by mouth every morning for 30 days. Max Daily Amount: 36 mg

## 2024-11-11 ENCOUNTER — LAB (OUTPATIENT)
Facility: CLINIC | Age: 54
End: 2024-11-11

## 2024-11-11 DIAGNOSIS — F90.0 ATTENTION-DEFICIT HYPERACTIVITY DISORDER, PREDOMINANTLY INATTENTIVE TYPE: ICD-10-CM

## 2024-11-11 DIAGNOSIS — E78.2 MIXED HYPERLIPIDEMIA: ICD-10-CM

## 2024-11-11 DIAGNOSIS — I10 ESSENTIAL HYPERTENSION: Primary | ICD-10-CM

## 2024-11-11 DIAGNOSIS — I10 ESSENTIAL HYPERTENSION: ICD-10-CM

## 2024-11-11 LAB
ALBUMIN SERPL-MCNC: 3.8 G/DL (ref 3.5–5)
ALBUMIN/GLOB SERPL: 1.3 (ref 1.1–2.2)
ALP SERPL-CCNC: 107 U/L (ref 45–117)
ALT SERPL-CCNC: 21 U/L (ref 12–78)
ANION GAP SERPL CALC-SCNC: 7 MMOL/L (ref 2–12)
AST SERPL-CCNC: 17 U/L (ref 15–37)
BILIRUB SERPL-MCNC: 0.4 MG/DL (ref 0.2–1)
BUN SERPL-MCNC: 13 MG/DL (ref 6–20)
BUN/CREAT SERPL: 16 (ref 12–20)
CALCIUM SERPL-MCNC: 9.3 MG/DL (ref 8.5–10.1)
CHLORIDE SERPL-SCNC: 107 MMOL/L (ref 97–108)
CHOLEST SERPL-MCNC: 177 MG/DL
CO2 SERPL-SCNC: 25 MMOL/L (ref 21–32)
CREAT SERPL-MCNC: 0.83 MG/DL (ref 0.55–1.02)
ERYTHROCYTE [DISTWIDTH] IN BLOOD BY AUTOMATED COUNT: 13.1 % (ref 11.5–14.5)
GLOBULIN SER CALC-MCNC: 2.9 G/DL (ref 2–4)
GLUCOSE SERPL-MCNC: 87 MG/DL (ref 65–100)
HCT VFR BLD AUTO: 46.6 % (ref 35–47)
HDLC SERPL-MCNC: 49 MG/DL
HDLC SERPL: 3.6 (ref 0–5)
HGB BLD-MCNC: 15.2 G/DL (ref 11.5–16)
LDLC SERPL CALC-MCNC: 88.4 MG/DL (ref 0–100)
MCH RBC QN AUTO: 29 PG (ref 26–34)
MCHC RBC AUTO-ENTMCNC: 32.6 G/DL (ref 30–36.5)
MCV RBC AUTO: 88.9 FL (ref 80–99)
NRBC # BLD: 0 K/UL (ref 0–0.01)
NRBC BLD-RTO: 0 PER 100 WBC
PLATELET # BLD AUTO: 234 K/UL (ref 150–400)
PMV BLD AUTO: 10.3 FL (ref 8.9–12.9)
POTASSIUM SERPL-SCNC: 3.8 MMOL/L (ref 3.5–5.1)
PROT SERPL-MCNC: 6.7 G/DL (ref 6.4–8.2)
RBC # BLD AUTO: 5.24 M/UL (ref 3.8–5.2)
SODIUM SERPL-SCNC: 139 MMOL/L (ref 136–145)
TRIGL SERPL-MCNC: 198 MG/DL
VLDLC SERPL CALC-MCNC: 39.6 MG/DL
WBC # BLD AUTO: 9.5 K/UL (ref 3.6–11)

## 2024-11-14 LAB
DRUG NAME: NORMAL
DRUGS UR: NORMAL
MED LIST NOT PROVIDED?: NO
MED LIST ON REQUISITION?: NO
MED LIST ON SEPARATE FORM?: NO
NO MEDICATION USE?: NO
RX NORM CODE: NORMAL
RX NORM SOURCE: NORMAL
RX NORM TEXT: NORMAL
SEQUENCE NUMBER: NORMAL

## 2024-11-15 ENCOUNTER — OFFICE VISIT (OUTPATIENT)
Facility: CLINIC | Age: 54
End: 2024-11-15

## 2024-11-15 VITALS
OXYGEN SATURATION: 99 % | DIASTOLIC BLOOD PRESSURE: 74 MMHG | RESPIRATION RATE: 16 BRPM | TEMPERATURE: 97.8 F | HEIGHT: 62 IN | SYSTOLIC BLOOD PRESSURE: 118 MMHG | BODY MASS INDEX: 43.58 KG/M2 | WEIGHT: 236.8 LBS | HEART RATE: 94 BPM

## 2024-11-15 DIAGNOSIS — Z12.11 SCREEN FOR COLON CANCER: ICD-10-CM

## 2024-11-15 DIAGNOSIS — E66.813 CLASS 3 SEVERE OBESITY DUE TO EXCESS CALORIES WITH SERIOUS COMORBIDITY AND BODY MASS INDEX (BMI) OF 40.0 TO 44.9 IN ADULT: ICD-10-CM

## 2024-11-15 DIAGNOSIS — E78.2 MIXED HYPERLIPIDEMIA: ICD-10-CM

## 2024-11-15 DIAGNOSIS — Z87.891 PERSONAL HISTORY OF TOBACCO USE: ICD-10-CM

## 2024-11-15 DIAGNOSIS — F90.0 ATTENTION-DEFICIT HYPERACTIVITY DISORDER, PREDOMINANTLY INATTENTIVE TYPE: ICD-10-CM

## 2024-11-15 DIAGNOSIS — I10 ESSENTIAL HYPERTENSION: Primary | ICD-10-CM

## 2024-11-15 DIAGNOSIS — E66.01 CLASS 3 SEVERE OBESITY DUE TO EXCESS CALORIES WITH SERIOUS COMORBIDITY AND BODY MASS INDEX (BMI) OF 40.0 TO 44.9 IN ADULT: ICD-10-CM

## 2024-11-15 NOTE — PROGRESS NOTES
Aye Herrmann is a 54 y.o. female     Chief Complaint   Patient presents with    Hypertension     4M       /74 (Site: Left Upper Arm, Position: Sitting, Cuff Size: Medium Adult)   Pulse 94   Temp 97.8 °F (36.6 °C) (Oral)   Resp 16   Ht 1.575 m (5' 2\")   Wt 107.4 kg (236 lb 12.8 oz)   SpO2 99%   BMI 43.31 kg/m²     Health Maintenance Due   Topic Date Due    Pneumococcal 0-64 years Vaccine (1 of 2 - PCV) Never done    Hepatitis B vaccine (1 of 3 - 19+ 3-dose series) Never done    Cervical cancer screen  Never done    Colorectal Cancer Screen  Never done    Shingles vaccine (1 of 2) Never done    Lung Cancer Screening &/or Counseling  Never done    Flu vaccine (1) 08/01/2024    COVID-19 Vaccine (4 - 2023-24 season) 09/01/2024         \"Have you been to the ER, urgent care clinic since your last visit?  Hospitalized since your last visit?\"    NO    “Have you seen or consulted any other health care providers outside of [facility] since your last visit?”    NO    “Have you had a colorectal cancer screening such as a colonoscopy/FIT/Cologuard?    NO    No colonoscopy on file  No cologuard on file  No FIT/FOBT on file   No flexible sigmoidoscopy on file         “Have you had a pap smear?”    NO    No cervical cancer screening on file                   
years    ASSESSMENT/PLAN:    Diagnosis Orders   1. Essential hypertension        2. Mixed hyperlipidemia        3. Attention-deficit hyperactivity disorder, predominantly inattentive type        4. Class 3 severe obesity due to excess calories with serious comorbidity and body mass index (BMI) of 40.0 to 44.9 in adult        5. Screen for colon cancer  RAO - Rocio Steele MD, Gastroenterology, [place]      6. Personal history of tobacco use  NE VISIT TO DISCUSS LUNG CA SCREEN W LDCT    CT Lung Screen (Initial/Annual/Baseline)        1: Labs were reviewed with patient again today.  Patient has no questions.  Labs appear stable and without significant concern.  2: Patient to continue lisinopril/HCTZ daily for management of hypertension.  Blood pressure appears stable and well-controlled.  No adverse side effects.  3: Patient to continue atorvastatin daily for management of mixed hyperlipidemia.  Patient tolerating well.  4: Patient advised to focus on healthy lifestyle management with appropriate dietary intake.  Low-fat/low-cholesterol diet with no added salt recommended.  Maintain adequate amounts of fiber and water.  Continue regular patterns of exercise and reduce caloric intake for weight loss.  5: Patient to continue methylphenidate daily as prescribed for management of ADHD.  Symptoms appear to be stable and well-controlled.  No adverse side effects of medication.  6: I will arrange for low-dose CT scan as discussed due to longstanding history of smoking.  Patient advised to work on smoking cessation to reduce health risks.  7: Patient will be referred to gastroenterology for screening colonoscopy as discussed.  8: Continue all other medications as directed.  9: Patient to follow-up with me for recheck in approximately 4 months, or sooner as needed.  Patient agrees and states understanding.      ATTENTION:   This medical record was transcribed using an electronic medical records system.  Although

## 2024-11-23 DIAGNOSIS — F90.0 ATTENTION-DEFICIT HYPERACTIVITY DISORDER, PREDOMINANTLY INATTENTIVE TYPE: ICD-10-CM

## 2024-11-25 RX ORDER — METHYLPHENIDATE HYDROCHLORIDE 36 MG/1
36 TABLET, EXTENDED RELEASE ORAL EVERY MORNING
Qty: 30 TABLET | Refills: 0 | Status: SHIPPED | OUTPATIENT
Start: 2024-11-25 | End: 2024-12-25

## 2024-11-25 NOTE — TELEPHONE ENCOUNTER
PCP: Obed Garcia APRN - NP    Last appt: 11/15/2024    Future Appointments   Date Time Provider Department Center   3/31/2025 10:00 AM Obed Garcia APRN - NP [facility] DEP       Requested Prescriptions     Pending Prescriptions Disp Refills    methylphenidate 36 MG CR tablet 30 tablet 0     Sig: Take 1 tablet by mouth every morning for 30 days. Max Daily Amount: 36 mg

## 2024-11-26 DIAGNOSIS — J30.89 OTHER ALLERGIC RHINITIS: ICD-10-CM

## 2024-11-26 RX ORDER — AZELASTINE HYDROCHLORIDE, FLUTICASONE PROPIONATE 137; 50 UG/1; UG/1
SPRAY, METERED NASAL
Qty: 23 G | Refills: 5 | Status: SHIPPED | OUTPATIENT
Start: 2024-11-26

## 2024-11-26 RX ORDER — ALBUTEROL SULFATE 90 UG/1
INHALANT RESPIRATORY (INHALATION)
Qty: 6.7 EACH | Refills: 6 | Status: SHIPPED | OUTPATIENT
Start: 2024-11-26

## 2024-11-26 NOTE — TELEPHONE ENCOUNTER
PCP: Obed Garcia APRN - NP    Last appt: 11/15/2024    Future Appointments   Date Time Provider Department Center   3/31/2025 10:00 AM Obed Garcia APRN - NP [facility] DEP       Requested Prescriptions     Pending Prescriptions Disp Refills    Azelastine-Fluticasone 137-50 MCG/ACT SUSP [Pharmacy Med Name: AZELASTIN-FLUTIC 137-50MCG SPR] 23 g 5     Sig: SPRAY 1 SPRAY INTO EACH NOSTRIL EVERY DAY    albuterol sulfate HFA (PROVENTIL;VENTOLIN;PROAIR) 108 (90 Base) MCG/ACT inhaler [Pharmacy Med Name: ALBUTEROL HFA (PROVENTIL) INH] 6.7 each 6     Sig: TAKE 2 PUFFS BY MOUTH EVERY 4 HOURS AS NEEDED FOR WHEEZE        abdominal pain

## 2024-12-09 RX ORDER — LISINOPRIL AND HYDROCHLOROTHIAZIDE 10; 12.5 MG/1; MG/1
TABLET ORAL
Qty: 90 TABLET | Refills: 1 | Status: SHIPPED | OUTPATIENT
Start: 2024-12-09

## 2024-12-09 RX ORDER — PRIMIDONE 50 MG/1
TABLET ORAL
Qty: 90 TABLET | Refills: 1 | Status: SHIPPED | OUTPATIENT
Start: 2024-12-09

## 2024-12-09 NOTE — TELEPHONE ENCOUNTER
PCP: Obed Garcia APRN - NP    Last appt: 11/15/2024    Future Appointments   Date Time Provider Department Center   3/31/2025 10:00 AM Obed Garcia APRN - NP [facility] DEP       Requested Prescriptions     Pending Prescriptions Disp Refills    lisinopril-hydroCHLOROthiazide (PRINZIDE;ZESTORETIC) 10-12.5 MG per tablet [Pharmacy Med Name: LISINOPRIL-HCTZ 10-12.5 MG TAB] 90 tablet 1     Sig: TAKE 1 TABLET BY MOUTH EVERY DAY    primidone (MYSOLINE) 50 MG tablet [Pharmacy Med Name: PRIMIDONE 50 MG TABLET] 90 tablet 1     Sig: TAKE 1 TABLET BY MOUTH EVERY DAY

## 2024-12-10 DIAGNOSIS — F41.1 GAD (GENERALIZED ANXIETY DISORDER): ICD-10-CM

## 2024-12-10 RX ORDER — CLONAZEPAM 0.5 MG/1
TABLET ORAL
Qty: 90 TABLET | Refills: 0 | Status: SHIPPED | OUTPATIENT
Start: 2024-12-10 | End: 2025-03-10

## 2024-12-10 NOTE — TELEPHONE ENCOUNTER
PCP: Obed Garcia APRN - NP    Last appt: 11/15/2024    Future Appointments   Date Time Provider Department Center   3/31/2025 10:00 AM Obed Garcia APRN - NP [facility] DEP       Requested Prescriptions     Pending Prescriptions Disp Refills    clonazePAM (KLONOPIN) 0.5 MG tablet 90 tablet 0     Sig: TAKE 1 TABLET BY MOUTH EVERY DAY AT BEDTIME AS NEEDED FOR ANXIETY

## 2024-12-15 DIAGNOSIS — M62.830 MUSCLE SPASM OF BACK: ICD-10-CM

## 2024-12-17 NOTE — TELEPHONE ENCOUNTER
PCP: Obed Garcia APRN - NP    Last appt: 11/15/2024  Future Appointments   Date Time Provider Department Center   3/31/2025 10:00 AM Obed Garcia APRN - NP [facility] DEP       Requested Prescriptions     Pending Prescriptions Disp Refills    methocarbamol (ROBAXIN) 500 MG tablet [Pharmacy Med Name: METHOCARBAMOL 500 MG TABLET] 90 tablet 1     Sig: TAKE 1 TABLET BY MOUTH THREE TIMES A DAY AS NEEDED FOR MUSCLE SPASM       Prior labs and Blood pressures:  BP Readings from Last 3 Encounters:   11/15/24 118/74   07/05/24 124/78   03/04/24 122/72     Lab Results   Component Value Date/Time     11/11/2024 09:14 AM    K 3.8 11/11/2024 09:14 AM     11/11/2024 09:14 AM    CO2 25 11/11/2024 09:14 AM    BUN 13 11/11/2024 09:14 AM    GFRAA >60 06/08/2022 10:17 AM     No results found for: \"HBA1C\", \"[id number]\"  Lab Results   Component Value Date/Time    CHOL 177 11/11/2024 09:14 AM    HDL 49 11/11/2024 09:14 AM    LDL 88.4 11/11/2024 09:14 AM    .6 03/04/2024 02:15 PM    VLDL 39.6 11/11/2024 09:14 AM    VLDL 37 03/09/2021 10:27 AM     No results found for: \"VITD3\"        Lab Results   Component Value Date/Time    TSH 1.24 03/04/2024 02:15 PM

## 2024-12-18 RX ORDER — METHOCARBAMOL 500 MG/1
TABLET, FILM COATED ORAL
Qty: 90 TABLET | Refills: 1 | Status: SHIPPED | OUTPATIENT
Start: 2024-12-18

## 2024-12-20 DIAGNOSIS — F90.0 ATTENTION-DEFICIT HYPERACTIVITY DISORDER, PREDOMINANTLY INATTENTIVE TYPE: ICD-10-CM

## 2024-12-20 RX ORDER — METHYLPHENIDATE HYDROCHLORIDE 36 MG/1
36 TABLET, EXTENDED RELEASE ORAL EVERY MORNING
Qty: 30 TABLET | Refills: 0 | Status: SHIPPED | OUTPATIENT
Start: 2024-12-20 | End: 2025-01-19

## 2024-12-20 NOTE — TELEPHONE ENCOUNTER
PCP: Obed Garcia APRN - NP    Last appt: 11/15/2024  Future Appointments   Date Time Provider Department Center   3/31/2025 10:00 AM Obed Garcia APRN - NP [facility] DEP       Requested Prescriptions     Pending Prescriptions Disp Refills    methylphenidate 36 MG CR tablet 30 tablet 0     Sig: Take 1 tablet by mouth every morning for 30 days. Max Daily Amount: 36 mg       Prior labs and Blood pressures:  BP Readings from Last 3 Encounters:   11/15/24 118/74   07/05/24 124/78   03/04/24 122/72     Lab Results   Component Value Date/Time     11/11/2024 09:14 AM    K 3.8 11/11/2024 09:14 AM     11/11/2024 09:14 AM    CO2 25 11/11/2024 09:14 AM    BUN 13 11/11/2024 09:14 AM    GFRAA >60 06/08/2022 10:17 AM     No results found for: \"HBA1C\", \"[id number]\"  Lab Results   Component Value Date/Time    CHOL 177 11/11/2024 09:14 AM    HDL 49 11/11/2024 09:14 AM    LDL 88.4 11/11/2024 09:14 AM    .6 03/04/2024 02:15 PM    VLDL 39.6 11/11/2024 09:14 AM    VLDL 37 03/09/2021 10:27 AM     No results found for: \"VITD3\"        Lab Results   Component Value Date/Time    TSH 1.24 03/04/2024 02:15 PM

## 2024-12-26 DIAGNOSIS — F90.0 ATTENTION-DEFICIT HYPERACTIVITY DISORDER, PREDOMINANTLY INATTENTIVE TYPE: ICD-10-CM

## 2024-12-26 RX ORDER — METHYLPHENIDATE HYDROCHLORIDE 36 MG/1
36 TABLET, EXTENDED RELEASE ORAL EVERY MORNING
Qty: 30 TABLET | Refills: 0 | Status: SHIPPED | OUTPATIENT
Start: 2024-12-26 | End: 2025-01-25

## 2024-12-26 NOTE — TELEPHONE ENCOUNTER
PCP: Obed Garcia APRN - NP    Last appt: 11/15/2024    Future Appointments   Date Time Provider Department Center   3/31/2025 10:00 AM Obed Garcia APRN - NP [facility] DEP       Requested Prescriptions     Pending Prescriptions Disp Refills    methylphenidate 36 MG CR tablet 30 tablet 0     Sig: Take 1 tablet by mouth every morning for 30 days. Max Daily Amount: 36 mg

## 2025-01-23 DIAGNOSIS — F90.0 ATTENTION-DEFICIT HYPERACTIVITY DISORDER, PREDOMINANTLY INATTENTIVE TYPE: ICD-10-CM

## 2025-01-23 RX ORDER — METHYLPHENIDATE HYDROCHLORIDE 36 MG/1
36 TABLET, EXTENDED RELEASE ORAL EVERY MORNING
Qty: 30 TABLET | Refills: 0 | Status: SHIPPED | OUTPATIENT
Start: 2025-01-23 | End: 2025-02-22

## 2025-01-23 NOTE — TELEPHONE ENCOUNTER
PCP: Obed Garcia APRN - NP    Last appt:   11/15/2024    Future Appointments   Date Time Provider Department Center   3/31/2025 10:00 AM Obed Garcia APRN - NP [facility] DEP       Requested Prescriptions     Pending Prescriptions Disp Refills    methylphenidate 36 MG CR tablet 30 tablet 0     Sig: Take 1 tablet by mouth every morning for 30 days. Max Daily Amount: 36 mg

## 2025-02-19 DIAGNOSIS — M62.830 MUSCLE SPASM OF BACK: ICD-10-CM

## 2025-02-19 RX ORDER — METHOCARBAMOL 500 MG/1
TABLET, FILM COATED ORAL
Qty: 90 TABLET | Refills: 1 | Status: SHIPPED | OUTPATIENT
Start: 2025-02-19

## 2025-02-19 RX ORDER — ATORVASTATIN CALCIUM 20 MG/1
20 TABLET, FILM COATED ORAL DAILY
Qty: 90 TABLET | Refills: 1 | Status: SHIPPED | OUTPATIENT
Start: 2025-02-19

## 2025-02-19 NOTE — TELEPHONE ENCOUNTER
PCP: Obed Garcia APRN - NP    Last appt: 11/15/2024  Future Appointments   Date Time Provider Department Center   3/31/2025 10:00 AM Obed Garcia APRN - NP [facility] DEP       Requested Prescriptions     Pending Prescriptions Disp Refills    methocarbamol (ROBAXIN) 500 MG tablet [Pharmacy Med Name: METHOCARBAMOL 500 MG TABLET] 90 tablet 1     Sig: TAKE 1 TABLET BY MOUTH THREE TIMES A DAY AS NEEDED FOR MUSCLE SPASM    atorvastatin (LIPITOR) 20 MG tablet [Pharmacy Med Name: ATORVASTATIN 20 MG TABLET] 90 tablet 1     Sig: TAKE 1 TABLET BY MOUTH EVERY DAY       Prior labs and Blood pressures:  BP Readings from Last 3 Encounters:   11/15/24 118/74   07/05/24 124/78   03/04/24 122/72     Lab Results   Component Value Date/Time     11/11/2024 09:14 AM    K 3.8 11/11/2024 09:14 AM     11/11/2024 09:14 AM    CO2 25 11/11/2024 09:14 AM    BUN 13 11/11/2024 09:14 AM    GFRAA >60 06/08/2022 10:17 AM     No results found for: \"HBA1C\", \"[id number]\"  Lab Results   Component Value Date/Time    CHOL 177 11/11/2024 09:14 AM    HDL 49 11/11/2024 09:14 AM    LDL 88.4 11/11/2024 09:14 AM    .6 03/04/2024 02:15 PM    VLDL 39.6 11/11/2024 09:14 AM    VLDL 37 03/09/2021 10:27 AM     No results found for: \"VITD3\"        Lab Results   Component Value Date/Time    TSH 1.24 03/04/2024 02:15 PM

## 2025-02-24 DIAGNOSIS — F90.0 ATTENTION-DEFICIT HYPERACTIVITY DISORDER, PREDOMINANTLY INATTENTIVE TYPE: ICD-10-CM

## 2025-02-24 RX ORDER — METHYLPHENIDATE HYDROCHLORIDE 36 MG/1
36 TABLET, EXTENDED RELEASE ORAL EVERY MORNING
Qty: 30 TABLET | Refills: 0 | Status: SHIPPED | OUTPATIENT
Start: 2025-02-24 | End: 2025-02-25 | Stop reason: SDUPTHER

## 2025-02-24 NOTE — TELEPHONE ENCOUNTER
PCP: Obed Garcia APRN - NP    Last appt: 11/15/2024    Future Appointments   Date Time Provider Department Center   3/31/2025 10:00 AM Obed Garcia APRN - NP [facility] DEP       Requested Prescriptions     Pending Prescriptions Disp Refills    methylphenidate 36 MG CR tablet 30 tablet 0     Sig: Take 1 tablet by mouth every morning for 30 days. Max Daily Amount: 36 mg

## 2025-02-25 DIAGNOSIS — F90.0 ATTENTION-DEFICIT HYPERACTIVITY DISORDER, PREDOMINANTLY INATTENTIVE TYPE: ICD-10-CM

## 2025-02-25 RX ORDER — METHYLPHENIDATE HYDROCHLORIDE 36 MG/1
36 TABLET, EXTENDED RELEASE ORAL EVERY MORNING
Qty: 30 TABLET | Refills: 0 | Status: SHIPPED | OUTPATIENT
Start: 2025-02-25 | End: 2025-03-27

## 2025-02-25 NOTE — TELEPHONE ENCOUNTER
RX refill request from the patient/pharmacy. Patient last seen 11/15/2024 without labs, and next appt. scheduled for 03/31/2025.   Requested Prescriptions     Pending Prescriptions Disp Refills    methylphenidate 36 MG CR tablet 30 tablet 0     Sig: Take 1 tablet by mouth every morning for 30 days. Max Daily Amount: 36 mg

## 2025-03-14 DIAGNOSIS — F41.1 GAD (GENERALIZED ANXIETY DISORDER): ICD-10-CM

## 2025-03-14 RX ORDER — CLONAZEPAM 0.5 MG/1
TABLET ORAL
Qty: 90 TABLET | Refills: 0 | Status: SHIPPED | OUTPATIENT
Start: 2025-03-14 | End: 2025-06-12

## 2025-03-14 NOTE — TELEPHONE ENCOUNTER
PCP: Obed Garcia APRN - NP    Last appt: 11/15/2024    Future Appointments   Date Time Provider Department Center   3/31/2025 10:00 AM Obed Garcia APRN - NP [facility] DEP       Requested Prescriptions     Pending Prescriptions Disp Refills    clonazePAM (KLONOPIN) 0.5 MG tablet 90 tablet 0     Sig: TAKE 1 TABLET BY MOUTH EVERY DAY AT BEDTIME AS NEEDED FOR ANXIETY

## 2025-03-17 DIAGNOSIS — F90.0 ATTENTION-DEFICIT HYPERACTIVITY DISORDER, PREDOMINANTLY INATTENTIVE TYPE: ICD-10-CM

## 2025-03-17 RX ORDER — METHYLPHENIDATE HYDROCHLORIDE 36 MG/1
36 TABLET, EXTENDED RELEASE ORAL EVERY MORNING
Qty: 30 TABLET | Refills: 0 | Status: SHIPPED | OUTPATIENT
Start: 2025-03-17 | End: 2025-04-16

## 2025-03-17 NOTE — TELEPHONE ENCOUNTER
PCP: Obed Garcia APRN - NP    Last appt: 11/15/2024    Future Appointments   Date Time Provider Department Center   3/31/2025 10:00 AM Obed Garcia APRN - NP [facility] DEP       Requested Prescriptions     Pending Prescriptions Disp Refills    methylphenidate 36 MG CR tablet 30 tablet 0     Sig: Take 1 tablet by mouth every morning for 30 days. Max Daily Amount: 36 mg

## 2025-03-31 ENCOUNTER — OFFICE VISIT (OUTPATIENT)
Facility: CLINIC | Age: 55
End: 2025-03-31
Payer: COMMERCIAL

## 2025-03-31 VITALS
WEIGHT: 226.6 LBS | TEMPERATURE: 98 F | SYSTOLIC BLOOD PRESSURE: 124 MMHG | HEIGHT: 62 IN | DIASTOLIC BLOOD PRESSURE: 70 MMHG | HEART RATE: 95 BPM | RESPIRATION RATE: 16 BRPM | BODY MASS INDEX: 41.7 KG/M2 | OXYGEN SATURATION: 99 %

## 2025-03-31 DIAGNOSIS — E78.2 MIXED HYPERLIPIDEMIA: ICD-10-CM

## 2025-03-31 DIAGNOSIS — E66.813 CLASS 3 SEVERE OBESITY DUE TO EXCESS CALORIES WITH SERIOUS COMORBIDITY AND BODY MASS INDEX (BMI) OF 40.0 TO 44.9 IN ADULT: ICD-10-CM

## 2025-03-31 DIAGNOSIS — Z00.00 ROUTINE PHYSICAL EXAMINATION: Primary | ICD-10-CM

## 2025-03-31 DIAGNOSIS — Z71.6 ENCOUNTER FOR TOBACCO USE CESSATION COUNSELING: ICD-10-CM

## 2025-03-31 DIAGNOSIS — I10 ESSENTIAL HYPERTENSION: ICD-10-CM

## 2025-03-31 DIAGNOSIS — F17.210 CIGARETTE NICOTINE DEPENDENCE WITHOUT COMPLICATION: ICD-10-CM

## 2025-03-31 DIAGNOSIS — E66.01 CLASS 3 SEVERE OBESITY DUE TO EXCESS CALORIES WITH SERIOUS COMORBIDITY AND BODY MASS INDEX (BMI) OF 40.0 TO 44.9 IN ADULT: ICD-10-CM

## 2025-03-31 DIAGNOSIS — F90.0 ATTENTION-DEFICIT HYPERACTIVITY DISORDER, PREDOMINANTLY INATTENTIVE TYPE: ICD-10-CM

## 2025-03-31 DIAGNOSIS — F41.1 GAD (GENERALIZED ANXIETY DISORDER): ICD-10-CM

## 2025-03-31 PROCEDURE — 3074F SYST BP LT 130 MM HG: CPT | Performed by: NURSE PRACTITIONER

## 2025-03-31 PROCEDURE — 3078F DIAST BP <80 MM HG: CPT | Performed by: NURSE PRACTITIONER

## 2025-03-31 PROCEDURE — 99396 PREV VISIT EST AGE 40-64: CPT | Performed by: NURSE PRACTITIONER

## 2025-03-31 SDOH — ECONOMIC STABILITY: FOOD INSECURITY: WITHIN THE PAST 12 MONTHS, THE FOOD YOU BOUGHT JUST DIDN'T LAST AND YOU DIDN'T HAVE MONEY TO GET MORE.: NEVER TRUE

## 2025-03-31 SDOH — ECONOMIC STABILITY: FOOD INSECURITY: WITHIN THE PAST 12 MONTHS, YOU WORRIED THAT YOUR FOOD WOULD RUN OUT BEFORE YOU GOT MONEY TO BUY MORE.: NEVER TRUE

## 2025-03-31 ASSESSMENT — PATIENT HEALTH QUESTIONNAIRE - PHQ9
1. LITTLE INTEREST OR PLEASURE IN DOING THINGS: NOT AT ALL
SUM OF ALL RESPONSES TO PHQ QUESTIONS 1-9: 0
2. FEELING DOWN, DEPRESSED OR HOPELESS: NOT AT ALL

## 2025-03-31 NOTE — PROGRESS NOTES
Chief Complaint   Patient presents with    Annual Exam       SUBJECTIVE:    Aye Herrmann is a 54 y.o. female who is here today for a routine physical examination as well as follow up appointment regarding current medical conditions including: Essential hypertension, mixed hyperlipidemia, ADHD, CONNER, severe obesity, and ongoing nicotine dependence with cigarette smoking.  She is fasting today.  She denies any new or acute complaints at this time.    The patient remains on lisinopril/HCTZ daily for management of her hypertension.  Her blood pressure has remained stable and in good control overall.  She denies any adverse side effects of medication.  She continues to take atorvastatin daily for management of her mixed hyperlipidemia and tolerates this well.  She denies any recent episodes of chest pain, chest pressure, shortness of breath, headaches, dizziness, blurred vision, palpitations, or syncope episodes.    She has a longstanding history of ADHD and is currently on methylphenidate 36 mg daily for this.  She states the medication continues to be effective and helps her with improved focus, decreased distractibility, and feels that she is able to complete tasks in a more timely manner.  She continues to work full-time.  She has followed up with me at regular intervals.  Previous urine drug screens have been unremarkable.     She continues to take clonazepam due to her generalized anxiety.  She states the medication continues to be helpful and useful.  Her symptoms have been stable.    She continues to smoke cigarettes despite health risks and repeated warnings.  She has no immediate plans on quitting.  She uses an albuterol inhaler as needed when she is having shortness of breath or wheezing.  She has had no hemoptysis.      Current Outpatient Medications   Medication Sig Dispense Refill    methylphenidate 36 MG CR tablet Take 1 tablet by mouth every morning for 30 days. Max Daily Amount: 36 mg 30

## 2025-03-31 NOTE — PROGRESS NOTES
Aye Herrmann is a 54 y.o. female     Chief Complaint   Patient presents with    Annual Exam       /70   Pulse 95   Temp 98 °F (36.7 °C) (Oral)   Resp 16   Ht 1.575 m (5' 2\")   Wt 102.8 kg (226 lb 9.6 oz)   SpO2 99%   BMI 41.45 kg/m²     Health Maintenance Due   Topic Date Due    Hepatitis B vaccine (1 of 3 - 19+ 3-dose series) Never done    Pneumococcal 50+ years Vaccine (1 of 2 - PCV) Never done    Cervical cancer screen  Never done    Colorectal Cancer Screen  Never done    Shingles vaccine (1 of 2) Never done    Lung Cancer Screening &/or Counseling  Never done    Flu vaccine (1) 08/01/2024    COVID-19 Vaccine (4 - 2024-25 season) 09/01/2024    Depression Screen  03/04/2025         \"Have you been to the ER, urgent care clinic since your last visit?  Hospitalized since your last visit?\"    NO    “Have you seen or consulted any other health care providers outside of [facility] since your last visit?”    NO    “Have you had a colorectal cancer screening such as a colonoscopy/FIT/Cologuard?    NO    No colonoscopy on file  No cologuard on file  No FIT/FOBT on file   No flexible sigmoidoscopy on file         “Have you had a pap smear?”    NO    No cervical cancer screening on file

## 2025-04-01 ENCOUNTER — RESULTS FOLLOW-UP (OUTPATIENT)
Facility: CLINIC | Age: 55
End: 2025-04-01

## 2025-04-01 ENCOUNTER — PATIENT MESSAGE (OUTPATIENT)
Facility: CLINIC | Age: 55
End: 2025-04-01

## 2025-04-01 DIAGNOSIS — Z87.892 HISTORY OF ANAPHYLAXIS: Primary | ICD-10-CM

## 2025-04-01 LAB
ALBUMIN SERPL-MCNC: 4.1 G/DL (ref 3.5–5)
ALBUMIN/GLOB SERPL: 1.4 (ref 1.1–2.2)
ALP SERPL-CCNC: 118 U/L (ref 45–117)
ALT SERPL-CCNC: 19 U/L (ref 12–78)
ANION GAP SERPL CALC-SCNC: 9 MMOL/L (ref 2–12)
AST SERPL-CCNC: 18 U/L (ref 15–37)
BASOPHILS # BLD: 0.09 K/UL (ref 0–0.1)
BASOPHILS NFR BLD: 0.8 % (ref 0–1)
BILIRUB SERPL-MCNC: 0.4 MG/DL (ref 0.2–1)
BUN SERPL-MCNC: 4 MG/DL (ref 6–20)
BUN/CREAT SERPL: 5 (ref 12–20)
CALCIUM SERPL-MCNC: 9.7 MG/DL (ref 8.5–10.1)
CHLORIDE SERPL-SCNC: 100 MMOL/L (ref 97–108)
CHOLEST SERPL-MCNC: 178 MG/DL
CO2 SERPL-SCNC: 27 MMOL/L (ref 21–32)
CREAT SERPL-MCNC: 0.73 MG/DL (ref 0.55–1.02)
DIFFERENTIAL METHOD BLD: ABNORMAL
EOSINOPHIL # BLD: 0.18 K/UL (ref 0–0.4)
EOSINOPHIL NFR BLD: 1.5 % (ref 0–7)
ERYTHROCYTE [DISTWIDTH] IN BLOOD BY AUTOMATED COUNT: 13.1 % (ref 11.5–14.5)
GLOBULIN SER CALC-MCNC: 2.9 G/DL (ref 2–4)
GLUCOSE SERPL-MCNC: 92 MG/DL (ref 65–100)
HCT VFR BLD AUTO: 48.6 % (ref 35–47)
HDLC SERPL-MCNC: 56 MG/DL
HDLC SERPL: 3.2 (ref 0–5)
HGB BLD-MCNC: 16 G/DL (ref 11.5–16)
IMM GRANULOCYTES # BLD AUTO: 0.04 K/UL (ref 0–0.04)
IMM GRANULOCYTES NFR BLD AUTO: 0.3 % (ref 0–0.5)
LDLC SERPL CALC-MCNC: 85.6 MG/DL (ref 0–100)
LYMPHOCYTES # BLD: 2.34 K/UL (ref 0.8–3.5)
LYMPHOCYTES NFR BLD: 19.6 % (ref 12–49)
MCH RBC QN AUTO: 29.3 PG (ref 26–34)
MCHC RBC AUTO-ENTMCNC: 32.9 G/DL (ref 30–36.5)
MCV RBC AUTO: 89 FL (ref 80–99)
MONOCYTES # BLD: 0.62 K/UL (ref 0–1)
MONOCYTES NFR BLD: 5.2 % (ref 5–13)
NEUTS SEG # BLD: 8.66 K/UL (ref 1.8–8)
NEUTS SEG NFR BLD: 72.6 % (ref 32–75)
NRBC # BLD: 0 K/UL (ref 0–0.01)
NRBC BLD-RTO: 0 PER 100 WBC
PLATELET # BLD AUTO: 251 K/UL (ref 150–400)
PMV BLD AUTO: 10.5 FL (ref 8.9–12.9)
POTASSIUM SERPL-SCNC: 3.6 MMOL/L (ref 3.5–5.1)
PROT SERPL-MCNC: 7 G/DL (ref 6.4–8.2)
RBC # BLD AUTO: 5.46 M/UL (ref 3.8–5.2)
SODIUM SERPL-SCNC: 136 MMOL/L (ref 136–145)
TRIGL SERPL-MCNC: 182 MG/DL
VLDLC SERPL CALC-MCNC: 36.4 MG/DL
WBC # BLD AUTO: 11.9 K/UL (ref 3.6–11)

## 2025-04-01 RX ORDER — EPINEPHRINE 0.3 MG/.3ML
INJECTION SUBCUTANEOUS
Qty: 2 EACH | Refills: 1 | Status: SHIPPED | OUTPATIENT
Start: 2025-04-01

## 2025-04-16 DIAGNOSIS — M62.830 MUSCLE SPASM OF BACK: ICD-10-CM

## 2025-04-16 RX ORDER — METHOCARBAMOL 500 MG/1
TABLET, FILM COATED ORAL
Qty: 90 TABLET | Refills: 1 | Status: SHIPPED | OUTPATIENT
Start: 2025-04-16

## 2025-04-16 NOTE — TELEPHONE ENCOUNTER
PCP: Obed Garcia APRN - NP    Last appt: 3/31/2025    Future Appointments   Date Time Provider Department Center   10/3/2025  9:00 AM Obed Garcia APRN - NP [facility] DEP       Requested Prescriptions     Pending Prescriptions Disp Refills    methocarbamol (ROBAXIN) 500 MG tablet [Pharmacy Med Name: METHOCARBAMOL 500 MG TABLET] 90 tablet 1     Sig: TAKE 1 TABLET BY MOUTH THREE TIMES A DAY AS NEEDED FOR MUSCLE SPASM

## 2025-04-25 DIAGNOSIS — F90.0 ATTENTION-DEFICIT HYPERACTIVITY DISORDER, PREDOMINANTLY INATTENTIVE TYPE: ICD-10-CM

## 2025-04-28 RX ORDER — METHYLPHENIDATE HYDROCHLORIDE 36 MG/1
36 TABLET, EXTENDED RELEASE ORAL EVERY MORNING
Qty: 30 TABLET | Refills: 0 | Status: SHIPPED | OUTPATIENT
Start: 2025-04-28 | End: 2025-05-28

## 2025-04-28 NOTE — TELEPHONE ENCOUNTER
PCP: Obed Garcia APRN - NP    Last appt: 3/31/2025    Future Appointments   Date Time Provider Department Center   10/3/2025  9:00 AM Obed Garcia APRN - NP [facility] DEP       Requested Prescriptions     Pending Prescriptions Disp Refills    methylphenidate 36 MG CR tablet 30 tablet 0     Sig: Take 1 tablet by mouth every morning for 30 days. Max Daily Amount: 36 mg

## 2025-05-13 DIAGNOSIS — J30.89 OTHER ALLERGIC RHINITIS: ICD-10-CM

## 2025-05-13 RX ORDER — AZELASTINE HYDROCHLORIDE, FLUTICASONE PROPIONATE 137; 50 UG/1; UG/1
SPRAY, METERED NASAL
Qty: 23 G | Refills: 5 | Status: SHIPPED | OUTPATIENT
Start: 2025-05-13

## 2025-05-13 NOTE — TELEPHONE ENCOUNTER
PCP: Obed Garcia APRN - NP    Last appt: 3/31/2025    Future Appointments   Date Time Provider Department Center   10/3/2025  9:00 AM Obed Garcia APRN - NP [facility] DEP       Requested Prescriptions     Pending Prescriptions Disp Refills    Azelastine-Fluticasone 137-50 MCG/ACT SUSP [Pharmacy Med Name: AZELASTIN-FLUTIC 137-50MCG SPR]  5     Sig: SPRAY 1 SPRAY INTO EACH NOSTRIL EVERY DAY

## 2025-05-20 DIAGNOSIS — F90.0 ATTENTION-DEFICIT HYPERACTIVITY DISORDER, PREDOMINANTLY INATTENTIVE TYPE: ICD-10-CM

## 2025-05-20 RX ORDER — METHYLPHENIDATE HYDROCHLORIDE 36 MG/1
36 TABLET, EXTENDED RELEASE ORAL EVERY MORNING
Qty: 30 TABLET | Refills: 0 | Status: SHIPPED | OUTPATIENT
Start: 2025-05-20 | End: 2025-06-19

## 2025-06-09 RX ORDER — LISINOPRIL AND HYDROCHLOROTHIAZIDE 10; 12.5 MG/1; MG/1
1 TABLET ORAL DAILY
Qty: 90 TABLET | Refills: 1 | Status: SHIPPED | OUTPATIENT
Start: 2025-06-09

## 2025-06-09 RX ORDER — PRIMIDONE 50 MG/1
50 TABLET ORAL DAILY
Qty: 90 TABLET | Refills: 1 | Status: SHIPPED | OUTPATIENT
Start: 2025-06-09

## 2025-06-09 NOTE — TELEPHONE ENCOUNTER
PCP: Obed Garcia APRN - NP    Last appt: 3/31/2025    Future Appointments   Date Time Provider Department Center   10/3/2025  9:00 AM Obed Garcia APRN - NP [facility] DEP       Requested Prescriptions     Pending Prescriptions Disp Refills    primidone (MYSOLINE) 50 MG tablet [Pharmacy Med Name: PRIMIDONE 50 MG TABLET] 90 tablet 1     Sig: TAKE 1 TABLET BY MOUTH EVERY DAY    lisinopril-hydroCHLOROthiazide (PRINZIDE;ZESTORETIC) 10-12.5 MG per tablet [Pharmacy Med Name: LISINOPRIL-HCTZ 10-12.5 MG TAB] 90 tablet 1     Sig: TAKE 1 TABLET BY MOUTH EVERY DAY

## 2025-06-11 DIAGNOSIS — M62.830 MUSCLE SPASM OF BACK: ICD-10-CM

## 2025-06-11 RX ORDER — METHOCARBAMOL 500 MG/1
TABLET, FILM COATED ORAL
Qty: 90 TABLET | Refills: 1 | Status: SHIPPED | OUTPATIENT
Start: 2025-06-11

## 2025-06-11 NOTE — TELEPHONE ENCOUNTER
PCP: Obed Garcia APRN - NP    Last appt: 3/31/2025    Future Appointments   Date Time Provider Department Center   10/3/2025  9:00 AM Obed Garcia APRN - NP [facility] DEP       Requested Prescriptions     Pending Prescriptions Disp Refills    methocarbamol (ROBAXIN) 500 MG tablet [Pharmacy Med Name: METHOCARBAMOL 500 MG TABLET] 90 tablet 1     Sig: TAKE 1 TABLET BY MOUTH THREE TIMES A DAY AS NEEDED FOR MUSCLE SPASM

## 2025-06-13 DIAGNOSIS — F41.1 GAD (GENERALIZED ANXIETY DISORDER): ICD-10-CM

## 2025-06-13 RX ORDER — CLONAZEPAM 0.5 MG/1
TABLET ORAL
Qty: 90 TABLET | Refills: 0 | Status: SHIPPED | OUTPATIENT
Start: 2025-06-13 | End: 2025-09-11

## 2025-06-13 NOTE — TELEPHONE ENCOUNTER
PCP: Obed Garcia APRN - NP    Last appt: 3/31/2025    Future Appointments   Date Time Provider Department Center   10/3/2025  9:00 AM Obed Garcia APRN - NP [facility] DEP       Requested Prescriptions     Pending Prescriptions Disp Refills    clonazePAM (KLONOPIN) 0.5 MG tablet 90 tablet 0     Sig: TAKE 1 TABLET BY MOUTH EVERY DAY AT BEDTIME AS NEEDED FOR ANXIETY

## 2025-06-24 DIAGNOSIS — F90.0 ATTENTION-DEFICIT HYPERACTIVITY DISORDER, PREDOMINANTLY INATTENTIVE TYPE: ICD-10-CM

## 2025-06-25 RX ORDER — METHYLPHENIDATE HYDROCHLORIDE 36 MG/1
36 TABLET, EXTENDED RELEASE ORAL EVERY MORNING
Qty: 30 TABLET | Refills: 0 | Status: SHIPPED | OUTPATIENT
Start: 2025-06-25 | End: 2025-07-25

## 2025-06-25 NOTE — TELEPHONE ENCOUNTER
PCP: Obed Garcia APRN - NP    Last appt: 3/31/2025    Future Appointments   Date Time Provider Department Center   10/3/2025  9:00 AM Obed Garcia APRN - NP [facility] DEP       Requested Prescriptions     Pending Prescriptions Disp Refills    methylphenidate 36 MG CR tablet 30 tablet 0     Sig: Take 1 tablet by mouth every morning for 30 days. Max Daily Amount: 36 mg

## 2025-07-24 DIAGNOSIS — F90.0 ATTENTION-DEFICIT HYPERACTIVITY DISORDER, PREDOMINANTLY INATTENTIVE TYPE: ICD-10-CM

## 2025-07-24 RX ORDER — METHYLPHENIDATE HYDROCHLORIDE 36 MG/1
36 TABLET, EXTENDED RELEASE ORAL EVERY MORNING
Qty: 30 TABLET | Refills: 0 | Status: SHIPPED | OUTPATIENT
Start: 2025-07-24 | End: 2025-08-23

## 2025-07-24 NOTE — TELEPHONE ENCOUNTER
PCP: Obed Garcia APRN - NP    Last appt: 3/31/2025    Future Appointments   Date Time Provider Department Center   10/3/2025  9:00 AM Obed Garcia APRN - NP [facility] DEP       Requested Prescriptions     Pending Prescriptions Disp Refills    methylphenidate 36 MG CR tablet 30 tablet 0     Sig: Take 1 tablet by mouth every morning for 30 days. Max Daily Amount: 36 mg

## 2025-08-06 DIAGNOSIS — M62.830 MUSCLE SPASM OF BACK: ICD-10-CM

## 2025-08-06 RX ORDER — ATORVASTATIN CALCIUM 20 MG/1
20 TABLET, FILM COATED ORAL DAILY
Qty: 90 TABLET | Refills: 1 | Status: SHIPPED | OUTPATIENT
Start: 2025-08-06

## 2025-08-06 RX ORDER — METHOCARBAMOL 500 MG/1
TABLET, FILM COATED ORAL
Qty: 90 TABLET | Refills: 1 | Status: SHIPPED | OUTPATIENT
Start: 2025-08-06

## 2025-08-21 DIAGNOSIS — F90.0 ATTENTION-DEFICIT HYPERACTIVITY DISORDER, PREDOMINANTLY INATTENTIVE TYPE: ICD-10-CM

## 2025-08-21 RX ORDER — METHYLPHENIDATE HYDROCHLORIDE 36 MG/1
36 TABLET, EXTENDED RELEASE ORAL EVERY MORNING
Qty: 30 TABLET | Refills: 0 | Status: SHIPPED | OUTPATIENT
Start: 2025-08-21 | End: 2025-09-20

## 2025-09-05 ENCOUNTER — COMMUNITY OUTREACH (OUTPATIENT)
Facility: CLINIC | Age: 55
End: 2025-09-05